# Patient Record
Sex: FEMALE | Race: OTHER
[De-identification: names, ages, dates, MRNs, and addresses within clinical notes are randomized per-mention and may not be internally consistent; named-entity substitution may affect disease eponyms.]

---

## 2019-07-25 ENCOUNTER — HOSPITAL ENCOUNTER (INPATIENT)
Dept: HOSPITAL 72 - EMR | Age: 74
LOS: 12 days | Discharge: NURSING, REHAB OR LONG-TERM CARE | DRG: 280 | End: 2019-08-06
Payer: MEDICARE

## 2019-07-25 VITALS — BODY MASS INDEX: 32.44 KG/M2 | WEIGHT: 190 LBS | HEIGHT: 64 IN

## 2019-07-25 VITALS — SYSTOLIC BLOOD PRESSURE: 134 MMHG | DIASTOLIC BLOOD PRESSURE: 90 MMHG

## 2019-07-25 DIAGNOSIS — E11.22: ICD-10-CM

## 2019-07-25 DIAGNOSIS — Z99.81: ICD-10-CM

## 2019-07-25 DIAGNOSIS — E55.9: ICD-10-CM

## 2019-07-25 DIAGNOSIS — E11.42: ICD-10-CM

## 2019-07-25 DIAGNOSIS — E87.2: ICD-10-CM

## 2019-07-25 DIAGNOSIS — E11.65: ICD-10-CM

## 2019-07-25 DIAGNOSIS — I13.0: Primary | ICD-10-CM

## 2019-07-25 DIAGNOSIS — B96.1: ICD-10-CM

## 2019-07-25 DIAGNOSIS — A41.9: ICD-10-CM

## 2019-07-25 DIAGNOSIS — J44.1: ICD-10-CM

## 2019-07-25 DIAGNOSIS — Z95.0: ICD-10-CM

## 2019-07-25 DIAGNOSIS — I48.92: ICD-10-CM

## 2019-07-25 DIAGNOSIS — Z86.73: ICD-10-CM

## 2019-07-25 DIAGNOSIS — I25.10: ICD-10-CM

## 2019-07-25 DIAGNOSIS — N39.0: ICD-10-CM

## 2019-07-25 DIAGNOSIS — E44.0: ICD-10-CM

## 2019-07-25 DIAGNOSIS — J18.9: ICD-10-CM

## 2019-07-25 DIAGNOSIS — Z95.5: ICD-10-CM

## 2019-07-25 DIAGNOSIS — J44.0: ICD-10-CM

## 2019-07-25 DIAGNOSIS — I21.4: ICD-10-CM

## 2019-07-25 DIAGNOSIS — E83.42: ICD-10-CM

## 2019-07-25 DIAGNOSIS — G93.41: ICD-10-CM

## 2019-07-25 DIAGNOSIS — J96.02: ICD-10-CM

## 2019-07-25 DIAGNOSIS — I49.5: ICD-10-CM

## 2019-07-25 DIAGNOSIS — I48.0: ICD-10-CM

## 2019-07-25 DIAGNOSIS — E11.319: ICD-10-CM

## 2019-07-25 DIAGNOSIS — I25.5: ICD-10-CM

## 2019-07-25 DIAGNOSIS — G47.33: ICD-10-CM

## 2019-07-25 DIAGNOSIS — N17.9: ICD-10-CM

## 2019-07-25 DIAGNOSIS — I50.43: ICD-10-CM

## 2019-07-25 DIAGNOSIS — E78.5: ICD-10-CM

## 2019-07-25 DIAGNOSIS — N18.9: ICD-10-CM

## 2019-07-25 LAB
ADD MANUAL DIFF: NO
APPEARANCE UR: CLEAR
APTT PPP: (no result) S
BASOPHILS NFR BLD AUTO: 0.7 % (ref 0–2)
EOSINOPHIL NFR BLD AUTO: 1.7 % (ref 0–3)
ERYTHROCYTE [DISTWIDTH] IN BLOOD BY AUTOMATED COUNT: 15.6 % (ref 11.6–14.8)
GLUCOSE UR STRIP-MCNC: (no result) MG/DL
HCT VFR BLD CALC: 37.9 % (ref 37–47)
HGB BLD-MCNC: 11.7 G/DL (ref 12–16)
KETONES UR QL STRIP: NEGATIVE
LEUKOCYTE ESTERASE UR QL STRIP: NEGATIVE
LYMPHOCYTES NFR BLD AUTO: 11.1 % (ref 20–45)
MCV RBC AUTO: 91 FL (ref 80–99)
MONOCYTES NFR BLD AUTO: 8.1 % (ref 1–10)
NEUTROPHILS NFR BLD AUTO: 78.5 % (ref 45–75)
NITRITE UR QL STRIP: POSITIVE
PH UR STRIP: 5 [PH] (ref 4.5–8)
PLATELET # BLD: 586 K/UL (ref 150–450)
PROT UR QL STRIP: (no result)
RBC # BLD AUTO: 4.17 M/UL (ref 4.2–5.4)
SP GR UR STRIP: 1.01 (ref 1–1.03)
UROBILINOGEN UR-MCNC: NORMAL MG/DL (ref 0–1)
WBC # BLD AUTO: 9.9 K/UL (ref 4.8–10.8)

## 2019-07-25 PROCEDURE — 82803 BLOOD GASES ANY COMBINATION: CPT

## 2019-07-25 PROCEDURE — 36600 WITHDRAWAL OF ARTERIAL BLOOD: CPT

## 2019-07-25 PROCEDURE — 82550 ASSAY OF CK (CPK): CPT

## 2019-07-25 PROCEDURE — 83036 HEMOGLOBIN GLYCOSYLATED A1C: CPT

## 2019-07-25 PROCEDURE — 84132 ASSAY OF SERUM POTASSIUM: CPT

## 2019-07-25 PROCEDURE — 85025 COMPLETE CBC W/AUTO DIFF WBC: CPT

## 2019-07-25 PROCEDURE — 74018 RADEX ABDOMEN 1 VIEW: CPT

## 2019-07-25 PROCEDURE — 87040 BLOOD CULTURE FOR BACTERIA: CPT

## 2019-07-25 PROCEDURE — 87181 SC STD AGAR DILUTION PER AGT: CPT

## 2019-07-25 PROCEDURE — 82553 CREATINE MB FRACTION: CPT

## 2019-07-25 PROCEDURE — 93005 ELECTROCARDIOGRAM TRACING: CPT

## 2019-07-25 PROCEDURE — 80061 LIPID PANEL: CPT

## 2019-07-25 PROCEDURE — 93306 TTE W/DOPPLER COMPLETE: CPT

## 2019-07-25 PROCEDURE — 87070 CULTURE OTHR SPECIMN AEROBIC: CPT

## 2019-07-25 PROCEDURE — 93970 EXTREMITY STUDY: CPT

## 2019-07-25 PROCEDURE — 96365 THER/PROPH/DIAG IV INF INIT: CPT

## 2019-07-25 PROCEDURE — 82962 GLUCOSE BLOOD TEST: CPT

## 2019-07-25 PROCEDURE — 83880 ASSAY OF NATRIURETIC PEPTIDE: CPT

## 2019-07-25 PROCEDURE — 83735 ASSAY OF MAGNESIUM: CPT

## 2019-07-25 PROCEDURE — 70450 CT HEAD/BRAIN W/O DYE: CPT

## 2019-07-25 PROCEDURE — 84484 ASSAY OF TROPONIN QUANT: CPT

## 2019-07-25 PROCEDURE — 71045 X-RAY EXAM CHEST 1 VIEW: CPT

## 2019-07-25 PROCEDURE — 99285 EMERGENCY DEPT VISIT HI MDM: CPT

## 2019-07-25 PROCEDURE — 87205 SMEAR GRAM STAIN: CPT

## 2019-07-25 PROCEDURE — 36415 COLL VENOUS BLD VENIPUNCTURE: CPT

## 2019-07-25 PROCEDURE — 94664 DEMO&/EVAL PT USE INHALER: CPT

## 2019-07-25 PROCEDURE — 87081 CULTURE SCREEN ONLY: CPT

## 2019-07-25 PROCEDURE — 85007 BL SMEAR W/DIFF WBC COUNT: CPT

## 2019-07-25 PROCEDURE — 94003 VENT MGMT INPAT SUBQ DAY: CPT

## 2019-07-25 PROCEDURE — 81003 URINALYSIS AUTO W/O SCOPE: CPT

## 2019-07-25 PROCEDURE — 94640 AIRWAY INHALATION TREATMENT: CPT

## 2019-07-25 PROCEDURE — 94002 VENT MGMT INPAT INIT DAY: CPT

## 2019-07-25 PROCEDURE — 80053 COMPREHEN METABOLIC PANEL: CPT

## 2019-07-25 PROCEDURE — 87086 URINE CULTURE/COLONY COUNT: CPT

## 2019-07-25 PROCEDURE — 80048 BASIC METABOLIC PNL TOTAL CA: CPT

## 2019-07-25 PROCEDURE — 94660 CPAP INITIATION&MGMT: CPT

## 2019-07-25 PROCEDURE — 84443 ASSAY THYROID STIM HORMONE: CPT

## 2019-07-25 PROCEDURE — 96375 TX/PRO/DX INJ NEW DRUG ADDON: CPT

## 2019-07-25 NOTE — EMERGENCY ROOM REPORT
History of Present Illness


General


Chief Complaint:  General Complaint


Source:  Patient, Medical Record





Present Illness


HPI


This is a 74-year-old  female with a history of diabetes, CHF, frequent 

fall who was just discharged from [facility] on July 17, 2018 for acute 

CHF exacerbation.  [facility] for chief complaint of increasing weakness and fall.  Work-up data 

showed elevated glucose and patient was given insulin.  Also labs and chest x-

ray show CHF.  Patient was given a dose of Lasix.  She was hypoxic on room air 

at 83%.  On 4 L it went to 98%.  She was seen there and sent to a SNF via BLS.  

When she moved at the nursing home, because of her condition, they refused 

admission and EMS brought her here instead.  Patient is complaining of 

generalized weakness.  Denies any other trauma.  Denies any fever chills but 

denies any nausea or vomiting.


Allergies:  


Uncoded Allergies:  


     SULFA (Allergy, Unknown, 7/25/19)





Patient History


Past Medical History:  see triage record, old chart reviewed, DM, HTN


Past Surgical History:  other


Pertinent Family History:  none


Social History:  Denies: smoking


Pregnant Now:  No


Immunizations:  other


Reviewed Nursing Documentation:  PMH: Agreed; PSxH: Agreed





Nursing Documentation-PMH


Past Medical History:  No Stated History


Hx Cardiac Problems:  Yes - CHF


Hx Hypertension:  Yes


Hx COPD:  Yes


Hx Diabetes:  Yes





Review of Systems


Constitutional:  Reports: malaise, weakness


Eye:  Denies: eye pain, blurred vision


ENT:  Denies: ear pain, nose congestion, throat swelling


Respiratory:  Denies: cough, shortness of breath


Cardiovascular:  Denies: chest pain, palpitations


Gastrointestinal:  Denies: abdominal pain, diarrhea, nausea, vomiting


Musculoskeletal:  Denies: back pain, joint pain


Skin:  Denies: rash


Neurological:  Denies: headache, numbness


Endocrine:  Denies: increased thirst, increased urine


Hematologic/Lymphatic:  Denies: easy bruising


All Other Systems:  negative except mentioned in HPI





Physical Exam





Vital Signs








  Date Time  Temp Pulse Resp B/P (MAP) Pulse Ox O2 Delivery O2 Flow Rate FiO2


 


7/25/19 22:50 98.1 18 16 121/93 (102) 97 Room Air  





Vitals unremarkable


Sp02 EP Interpretation:  reviewed, normal


General Appearance:  well appearing, no apparent distress, alert


Head:  normocephalic, atraumatic


Eyes:  bilateral eye PERRL, bilateral eye EOMI


ENT:  hearing grossly normal, normal pharynx


Neck:  full range of motion, supple, no meningismus


Respiratory:  chest non-tender, normal breath sounds, rales


Cardiovascular #1:  regular rate, rhythm, no murmur


Gastrointestinal:  normal bowel sounds, non tender, no mass, no organomegaly, 

no bruit, non-distended


Musculoskeletal:  back normal, normal range of motion, other - 1+ pitting edema


Psychiatric:  mood/affect normal





Medical Decision Making


Diagnostic Impression:  


 Primary Impression:  


 Acute exacerbation of CHF (congestive heart failure)


 Qualified Codes:  I50.9 - Heart failure, unspecified


 Additional Impressions:  


 Uncontrolled diabetes mellitus


 Qualified Codes:  E11.65 - Type 2 diabetes mellitus with hyperglycemia


 Bacteriuria


ER Course


Patient presents with altered mental status and weakness.  Her glucose very 

elevated.  She may have a urinary tract infection.  She does have bacteruria 

and positive nitrite.  We will go ahead and treat with antibiotics.  CT 

negative for CVA.  Troponin negative.  I discussed the case with Dr. Alva who 

will admit.


Lab Results Impression


Labs with elevated BNP and glucose


EKG Diagnostic Results


Rate:  normal, tachycardiac


Rhythm:  NSR


ST Segments:  other - bifasicular block





Rhythm Strip Diag. Results


EP Interpretation:  yes


Rate:  113


Rhythm:  NSR, no PVC's, no ectopy





Chest X-Ray Diagnostic Results


Chest X-Ray Diagnostic Results :  


   Chest X-Ray Ordered:  Yes


   # of Views/Limited/Complete:  1 View


   Indication:  Shortness of Breath


   EP Interpretation:  Yes


   Interpretation:  no consolidation, no effusion, no pneumothorax, other - 

Vascular congestion


   Impression:  Other - chf


   Electronically Signed by:  Kamron Dickson MD





CT/MRI/US Diagnostic Results


CT/MRI/US Diagnostic Results :  


   Imaging Test Ordered:  CT head


   Impression


Neg per radiologist





Last Vital Signs








  Date Time  Temp Pulse Resp B/P (MAP) Pulse Ox O2 Delivery O2 Flow Rate FiO2


 


7/25/19 22:50 98.1 18 16 121/93 (102) 97 Room Air  








Status:  improved


Disposition:  ADMITTED AS INPATIENT


Condition:  Serious











Kamron Dickson MD Jul 25, 2019 23:20

## 2019-07-25 NOTE — NUR
ED Nurse Note:

Pt BIBA RA 34, no medicl complaints at this time, [facility] home refused to accept 
pt from EMS after being sent home from a clinic. Pt is AO x 3~4 times, on 2L 
N/C. ANNE seen Pt at bedside.

## 2019-07-26 VITALS — SYSTOLIC BLOOD PRESSURE: 138 MMHG | DIASTOLIC BLOOD PRESSURE: 83 MMHG

## 2019-07-26 VITALS — SYSTOLIC BLOOD PRESSURE: 165 MMHG | DIASTOLIC BLOOD PRESSURE: 52 MMHG

## 2019-07-26 VITALS — DIASTOLIC BLOOD PRESSURE: 65 MMHG | SYSTOLIC BLOOD PRESSURE: 124 MMHG

## 2019-07-26 VITALS — SYSTOLIC BLOOD PRESSURE: 135 MMHG | DIASTOLIC BLOOD PRESSURE: 53 MMHG

## 2019-07-26 VITALS — SYSTOLIC BLOOD PRESSURE: 125 MMHG | DIASTOLIC BLOOD PRESSURE: 75 MMHG

## 2019-07-26 VITALS — SYSTOLIC BLOOD PRESSURE: 167 MMHG | DIASTOLIC BLOOD PRESSURE: 82 MMHG

## 2019-07-26 VITALS — DIASTOLIC BLOOD PRESSURE: 81 MMHG | SYSTOLIC BLOOD PRESSURE: 120 MMHG

## 2019-07-26 VITALS — DIASTOLIC BLOOD PRESSURE: 63 MMHG | SYSTOLIC BLOOD PRESSURE: 117 MMHG

## 2019-07-26 VITALS — SYSTOLIC BLOOD PRESSURE: 123 MMHG | DIASTOLIC BLOOD PRESSURE: 76 MMHG

## 2019-07-26 VITALS — DIASTOLIC BLOOD PRESSURE: 70 MMHG | SYSTOLIC BLOOD PRESSURE: 148 MMHG

## 2019-07-26 VITALS — SYSTOLIC BLOOD PRESSURE: 151 MMHG | DIASTOLIC BLOOD PRESSURE: 79 MMHG

## 2019-07-26 VITALS — DIASTOLIC BLOOD PRESSURE: 76 MMHG | SYSTOLIC BLOOD PRESSURE: 136 MMHG

## 2019-07-26 VITALS — DIASTOLIC BLOOD PRESSURE: 68 MMHG | SYSTOLIC BLOOD PRESSURE: 131 MMHG

## 2019-07-26 VITALS — SYSTOLIC BLOOD PRESSURE: 121 MMHG | DIASTOLIC BLOOD PRESSURE: 67 MMHG

## 2019-07-26 VITALS — SYSTOLIC BLOOD PRESSURE: 105 MMHG | DIASTOLIC BLOOD PRESSURE: 57 MMHG

## 2019-07-26 VITALS — SYSTOLIC BLOOD PRESSURE: 123 MMHG | DIASTOLIC BLOOD PRESSURE: 87 MMHG

## 2019-07-26 VITALS — SYSTOLIC BLOOD PRESSURE: 161 MMHG | DIASTOLIC BLOOD PRESSURE: 83 MMHG

## 2019-07-26 VITALS — DIASTOLIC BLOOD PRESSURE: 79 MMHG | SYSTOLIC BLOOD PRESSURE: 119 MMHG

## 2019-07-26 VITALS — DIASTOLIC BLOOD PRESSURE: 88 MMHG | SYSTOLIC BLOOD PRESSURE: 153 MMHG

## 2019-07-26 LAB
ALBUMIN SERPL-MCNC: 3.3 G/DL (ref 3.4–5)
ALBUMIN/GLOB SERPL: 0.7 {RATIO} (ref 1–2.7)
ALP SERPL-CCNC: 146 U/L (ref 46–116)
ALT SERPL-CCNC: 13 U/L (ref 12–78)
ANION GAP SERPL CALC-SCNC: 3 MMOL/L (ref 5–15)
ANION GAP SERPL CALC-SCNC: 7 MMOL/L (ref 5–15)
AST SERPL-CCNC: 30 U/L (ref 15–37)
BILIRUB SERPL-MCNC: 0.4 MG/DL (ref 0.2–1)
BUN SERPL-MCNC: 26 MG/DL (ref 7–18)
BUN SERPL-MCNC: 34 MG/DL (ref 7–18)
CALCIUM SERPL-MCNC: 10 MG/DL (ref 8.5–10.1)
CALCIUM SERPL-MCNC: 9.7 MG/DL (ref 8.5–10.1)
CHLORIDE SERPL-SCNC: 100 MMOL/L (ref 98–107)
CHLORIDE SERPL-SCNC: 97 MMOL/L (ref 98–107)
CK MB SERPL-MCNC: < 0.5 NG/ML (ref 0–3.6)
CK SERPL-CCNC: 87 U/L (ref 26–308)
CO2 SERPL-SCNC: 37 MMOL/L (ref 21–32)
CO2 SERPL-SCNC: 38 MMOL/L (ref 21–32)
CREAT SERPL-MCNC: 1.1 MG/DL (ref 0.55–1.3)
CREAT SERPL-MCNC: 1.4 MG/DL (ref 0.55–1.3)
GLOBULIN SER-MCNC: 4.8 G/DL
POTASSIUM SERPL-SCNC: 3.8 MMOL/L (ref 3.5–5.1)
POTASSIUM SERPL-SCNC: 6.6 MMOL/L (ref 3.5–5.1)
SODIUM SERPL-SCNC: 138 MMOL/L (ref 136–145)
SODIUM SERPL-SCNC: 144 MMOL/L (ref 136–145)

## 2019-07-26 RX ADMIN — HYDRALAZINE HYDROCHLORIDE SCH MG: 50 TABLET ORAL at 22:20

## 2019-07-26 RX ADMIN — APIXABAN SCH MG: 5 TABLET, FILM COATED ORAL at 17:27

## 2019-07-26 RX ADMIN — PANTOPRAZOLE SODIUM SCH MG: 40 INJECTION, POWDER, FOR SOLUTION INTRAVENOUS at 21:27

## 2019-07-26 RX ADMIN — INSULIN ASPART SCH UNITS: 100 INJECTION, SOLUTION INTRAVENOUS; SUBCUTANEOUS at 21:33

## 2019-07-26 RX ADMIN — IPRATROPIUM BROMIDE AND ALBUTEROL SULFATE SCH ML: .5; 3 SOLUTION RESPIRATORY (INHALATION) at 19:00

## 2019-07-26 RX ADMIN — INSULIN ASPART SCH UNITS: 100 INJECTION, SOLUTION INTRAVENOUS; SUBCUTANEOUS at 11:30

## 2019-07-26 RX ADMIN — ATORVASTATIN CALCIUM SCH MG: 80 TABLET, FILM COATED ORAL at 21:26

## 2019-07-26 RX ADMIN — METHYLPREDNISOLONE SODIUM SUCCINATE SCH MG: 40 INJECTION, POWDER, LYOPHILIZED, FOR SOLUTION INTRAMUSCULAR; INTRAVENOUS at 22:19

## 2019-07-26 RX ADMIN — INSULIN ASPART SCH UNITS: 100 INJECTION, SOLUTION INTRAVENOUS; SUBCUTANEOUS at 16:30

## 2019-07-26 RX ADMIN — IPRATROPIUM BROMIDE AND ALBUTEROL SULFATE SCH ML: .5; 3 SOLUTION RESPIRATORY (INHALATION) at 12:50

## 2019-07-26 RX ADMIN — HYDRALAZINE HYDROCHLORIDE SCH MG: 50 TABLET ORAL at 14:00

## 2019-07-26 NOTE — NUR
NURSE NOTES:

Pt is maintained on Bipap with settings 14/5 and FIO2 currently at 40% with O2sat 
fluctuating above 94%. Cardiac monitor displays arrhythmia with heart rate in the upper 
90's-100's. Pt remains afebrile. Pt has been repositioned with extremities elevated on 
pillows. Head of bed at semi-peters's.

## 2019-07-26 NOTE — NUR
NURSE NOTES:

Received pt who was transferred from [facility] via hospital bed to ICU. Pt hand off report 
received from Kirby BLEVINS. Pt is asleep, lethargic, responsive to painful stimuli. Pt is 
currently on Venturi mask at 6L, and is currently being placed on BIPAP per MD order by RT 
with settings 14/5 and FIO2 40% with O2sat fluctuating from 92-98%. Diminished breath sounds 
noted on auscultation. Cardiac monitor displays AFib with heart rate fluctuating in the 90's 
and palpable bounding peripheral pulses with edema on lower extremities. Pt has no IV access 
at this time. Will attempt to access peripheral line. Abdomen is large, round, distended, 
slightly hard/nontender to touch with hypoactive bowel sounds. Renee catheter is in place 
draining clear, yellow urine. Bed is locked with three side rails up, in lowest position and 
call light within easy reach. Will continue to monitor pt and follow plan of care per MD 
orders and protocol.

## 2019-07-26 NOTE — HISTORY AND PHYSICAL REPORT
DATE OF ADMISSION:  07/26/2019

CHIEF COMPLAINT:  Short of breath.



HISTORY OF PRESENT ILLNESS:  The patient is a 74-year-old woman, who was

admitted because of shortness of breath and diabetes, out of control.  She

was recently hospitalized at another facility for similar problems and was

discharged home.  She became weak at home after several days and yesterday

the family brought her to urgent care where she was evaluated.  She was

found to have compensated heart failure and frequent falls.  Her blood

sugar was 437 in the urgent care facility, but she was discharged to a

skilled nursing care facility.  When she arrived there, she was lethargic

and in respiratory distress and they refused her and she was redirected to

the emergency department here.  The patient is poorly responsive and can

provide no additional history.  Records reviewed.



PAST MEDICAL HISTORY:  Congestive heart failure, type 2 diabetes, frequent

falls, sick sinus syndrome, pacemaker, COPD, sleep apnea, anemia, aortic

atherosclerosis, coronary heart disease, chronic back pain, chronic

diastolic heart failure, chronic kidney disease, hyperlipidemia,

degenerative disc disease, peripheral neuropathy, and morbid obesity.  She

is oxygen dependent.  She has had a coronary stent placed in the past.

She has diabetic retinopathy.  There is vitamin D deficiency.



ALLERGIES:  Sulfa.



CODE STATUS:  DNR.



SURGICAL HISTORY:  Cholecystectomy, pacemaker.



SOCIAL HISTORY:  She does not drink or smoke.



REVIEW OF SYSTEMS:  Cannot be obtained.



MEDICATIONS:  Advair, amlodipine, baby aspirin, Lipitor, insulin, Plavix,

Lasix, hydralazine, iron, Januvia, lisinopril, metformin, metoprolol, and

vitamin D.



PHYSICAL EXAMINATION:

GENERAL:  The patient is poorly responsive, but is arousable.

VITAL SIGNS:  Blood pressure is satisfactory, heart rate was 79 to 92,

saturations 93% to 100% on mask oxygen.  There is no fever.  She is

somewhat overweight.

HEENT:  Head is normocephalic.

NECK:  No jugular veins visible.

CHEST:  Has a few rhonchi.

CARDIAC:  Irregular with rate controlled.

ABDOMEN:  Soft and nontender.  Liver and spleen are not enlarged.

EXTREMITIES:  No clubbing, cyanosis, or edema.



DIAGNOSTIC AND LABORATORY DATA:  Chest x-ray is reported to show vascular

congestion.  It is not available for my review.  EKG shows right

bundle-branch block and atrial fibrillation.  Laboratory tests show the

blood sugar was 424, but improved somewhat with insulin.  Potassium was

6.6, but improved on repeat.  BUN is 26, creatinine 1.1.  Natriuretic

peptide 4000.  Troponin negative.  Albumin is 3.3.  White count is normal,

hemoglobin is 11.7, and platelets are high at 586,000.  Urinalysis shows

protein and glucose.  CT brain showed an old infarct, but nothing acute.



IMPRESSION:

1. Congestive heart failure.

2. Diabetes, out of control.

3. Altered mental status with evidence of old cerebral infarct.

4. COPD.

5. Sleep apnea.

6. Atrial fibrillation, not on anticoagulants.

7. Coronary heart disease.

8. Aortic atherosclerosis.



PLAN:  The patient will be cared for on the telemetry floor.  We will get a

stat blood gas to evaluate for possible CO2 narcosis.  Cardiology and

Endocrinology have been called for consultation.  Neurology may be

appropriate.  If she has respiratory failure, she may be transferred to

ICU.  We will give Lasix intravenous and increase the insulin as needed.









  ______________________________________________

  Yakov Alva M.D. DR:  SOPHIA

D:  07/26/2019 08:52

T:  07/27/2019 00:33

JOB#:  [id number]

CC:

## 2019-07-26 NOTE — NUR
NURSE NOTES:

Orders received from Dr Alva for daily labs at 0400 CBC, CMP and ABGs. Order also received 
for NG tube insertion to for PO med administration and to start tube feeding per nutrition 
consult recommendation. Order also received for Troponin Q8hrs x3 and lipid/TSH for 0400 
tomorrow, venous duplex and SCDS. Will process all orders, follow and endorse to next shift.

## 2019-07-26 NOTE — DIAGNOSTIC IMAGING REPORT
Indication: Shortness of breath and coughing

 

Technique: One view of the chest

 

Comparison: None

 

Findings: Body habitus limits evaluation. Suboptimal inspiration. The heart is

enlarged. There is a left chest bifocal pacemaker. There is equivocal mild central

interstitial congestive changes.

 

Impression: Cardiomegaly

 

Equivocal mild interstitial congestion correlate with clinical findings

## 2019-07-26 NOTE — NUR
NURSE NOTES:

Pt is maintained on Bipap with settings 14/5 and FIO2 at 40% with O2sat above 95%. Pt's 
daughter and son are now at bedside. VS stable. Pt was repositioned.

## 2019-07-26 NOTE — NUR
NURSE NOTES:

Received pt from ED via gurney. Pt transferred to telemetry unit and bed without any 
incident. Received report from GEN Flannery. Pt is lethargic; arousable by name and light 
shaking. Pt is currently on 3L N/C, breathing through mouth and using accessory muscles; 
saturating at 95%. Vital signs are stable. Cardiac monitor is in placed, IV site intact, 
asymptomatic, and patent. Bed is in the lowest position and locked. Belongings list checked 
and accounted. Will call Dr. Alva for admission orders.

## 2019-07-26 NOTE — NUR
NURSE NOTES:

Called respiratory therapist, Kumar, who put pt on venturi mask at 6L, 35%. Pt's O2 is 
saturating at 93% with history of COPD.

## 2019-07-26 NOTE — EMERGENCY ROOM REPORT
History of Present Illness


General


Chief Complaint:  General Complaint


Source:  Patient, Medical Record





Present Illness


Allergies:  


Coded Allergies:  


     SULFA (SULFONAMIDE ANTIBIOTICS) (Unverified  Allergy, Unknown, 7/26/19)


Uncoded Allergies:  


     SULFA (Allergy, Unknown, 7/25/19)





Patient History


Pregnant Now:  No





Nursing Documentation-[facility]


Past Medical History:  No History, Except For


Hx Cardiac Problems:  Yes


Hx Hypertension:  Yes


Hx Pacemaker:  Yes - Lt chest


Hx Asthma:  Yes


Hx COPD:  Yes


Hx Diabetes:  Yes


Hx Cancer:  No


Hx Gastrointestinal Problems:  No


Hx Neurological Problems:  No





Physical Exam





Vital Signs








  Date Time  Temp Pulse Resp B/P (MAP) Pulse Ox O2 Delivery O2 Flow Rate FiO2


 


7/25/19 22:50 98.1 18 16 121/93 (102) 97 Room Air  


 


7/26/19 04:46       6.0 


 


7/26/19 09:50        30











Procedures


Critical Care Time


Critical Care Time


i.  I feel this is a highly complex case requiring extensive working including 

EKG/Rhythm strip, Xray/CT/US, Blood/urine lab work, repeat exams while in ED, 


and administration of strong opiates/narcotics for pain control, admission to 

hospital or close patient follow up.  





Total time: 30 min bedside evaluation and treatment excludes procedures (EKG). 


Reason for critical care: hypercapnia, acidosis, COPD


Possible complications: hypotension, hypertension, MI, shock, arrhythmias, 

metabolic acidosis, end organ damage, respiratory failure. 


Interventions: interpretation of ABG. intubation. 


Course: Called to evaluate this patient in the ICU.  On BiPAP for COPD.  After 

several hours of BiPAP repeat ABG shows worsening of PCO2 and pH.  Patient 

remains lethargic.  I made decision to intubate patient.  Intubated without 

complication.  Repeat chest x-ray shows ET tube in mainstem bronchus.  Will be 

pulled back and adjusted accordingly





Consultations: nursing staff, EMS, family 


Performed by: Dr Leija


Tolerated well condition = critical





j.  because of unstable vital signs this patient had a condition that could 

potentially threaten life or limb.  I feel this is a critical patient who 

required my full attention while patient was considered critical.  Total 

Critical Care Time excluding procedures was greater than 30 minutes





Intubation


Intubation :  


   Consent:  Emergent


   Intubation Method:  orotracheal


   Tube Size (cm):  7.5


   Medications:  Etomidate, Rocuronium


   Breath Sounds after Intubation:  equal


   Intubation Complications:  no complications


   Post Intubation Xray:  Yes


   Progress/Xray Impression:  ET TUbe in Right Mainstem Bronchus


   Attempts:  One


   Patient Tolerated:  Well


   Complications:  None





Medical Decision Making


Diagnostic Impression:  


 Primary Impression:  


 Acute exacerbation of CHF (congestive heart failure)


 Qualified Codes:  I50.9 - Heart failure, unspecified


 Additional Impressions:  


 Bacteriuria


 Uncontrolled diabetes mellitus


 Qualified Codes:  E11.65 - Type 2 diabetes mellitus with hyperglycemia


ER Course


I was called to evaluate this patient in the ICU.  On BiPAP for COPD.  After 

several hours of BiPAP patient ABG getting worse and PCO2 getting worse.  

Patient is lethargic.  I made decision to intubate patient.  Good breath sounds 

bilaterally.  O2 sats improved.  Repeat chest x-ray shows ET tube in mainstem 

bronchus.  Will be adjusted accordingly.  Care resumed by admitting physician





Last Vital Signs








  Date Time  Temp Pulse Resp B/P (MAP) Pulse Ox O2 Delivery O2 Flow Rate FiO2


 


7/26/19 14:00  97 15 120/81 (94) 99   


 


7/26/19 12:52      Facial  40


 


7/26/19 12:00 98.0       


 


7/26/19 09:00       6.0 








Status:  improved


Disposition:  ADMITTED AS INPATIENT


Condition:  Critical


Referrals:  


[facility],REFERRING (PCP)











Naveen Leija MD Jul 26, 2019 16:33

## 2019-07-26 NOTE — NUR
RESPIRATORY NOTE:

 Placed pt on Bipap 14/5- back up rate 12- 30%FiO2. Pt is lethargic, tolerating well with 
the setting. Foam tapes applied to cheeks, chin, and nose bridge to prevent skin breakdown, 
no redness or skin breakdown on pt face. End tidal CO2 monitor in place at bedside, EtCO2 
75mmHg. No SOB or resp distress noted at this time. Alarms are set and audible, Bipap is 
plugged into the red outlet, ambu bag is at bedside. Will continue to monitor pt.

## 2019-07-26 NOTE — DIAGNOSTIC IMAGING REPORT
Indication: Status post repositioning of malpositioned endotracheal tube

 

Technique: One view of the chest

 

Comparison: One hour earlier

 

Findings: Interim improved position previously malpositioned endotracheal tube, tip

now in satisfactory position approximately 3 cm above the carleen. Other findings are

unchanged

 

Impression: Improved and now satisfactory position of endotracheal tube

## 2019-07-26 NOTE — CARDIOLOGY REPORT
--------------- APPROVED REPORT --------------





EKG Measurement

Heart [id number]

[id number]

[id number]

[id number]





Atrial flutter with variable AV block

Right bundle branch block

Left posterior fascicular block

*** Bifascicular block ***

T wave abnormality, consider inferior ischemia

Abnormal ECG

## 2019-07-26 NOTE — NUR
NURSE NOTES:

Pt is asleep, awakens to touch, however lethargic. VS stable while currently on BIPAP. 
Family now at bedside. Renee continues to drain yellow urine with sediments. Pt is afebrile.

## 2019-07-26 NOTE — NUR
ED Nurse Note:

Recheck . Pt admit to Tele room 214-2. Report given to GEN Abdalla. Pt is 
current asleep,VSS, on 2L N/C 100% O2 Sat no distress.

## 2019-07-26 NOTE — NUR
NURSE NOTES:

Pt was intubated at bedside by ER MD, two RTs at bedside. ETT 7.5, currently at 23cm 
lipline, with vent settings AC20, , Peep 5.0, FIO2 40%, with O2sat now at 100%. Sputum 
culture was collected and sent to lab. VS stable.

## 2019-07-26 NOTE — DIAGNOSTIC IMAGING REPORT
Indication: Post intubation

 

Technique: One view of the chest

 

Comparison: 7/25/2019

 

Findings: Left chest bipolar pacemaker is again demonstrated. Interim endotracheal

intubation, endotracheal tube projecting slightly into the right mainstem bronchus.

There is better inspiration. Lungs and pleural spaces are clear. Heart size is

enlarged.

 

Impression: Low position of endotracheal tube, within the right mainstem bronchus.

Retraction recommended. This critical value finding was discussed by phone with Dr. Leija previously

 

Cardiomegaly

 

Apparent improved interstitial congestion, probably in part apparent and artifactual

due to better aeration of the lungs

## 2019-07-26 NOTE — NUR
HAND-OFF: 

Report given to GEN Chambers.  Patient transfer to ICU room F.  

Patient is baseline lethargic and responded to voice.  Patient is on cardiac monitor and 
afib.  Patient is on venturi mask 6L 35%.  Patient is breathing labored and using accessory 
muscles.  Patient has Renee that is intact, draining and patent.   Patient is on fall 
precaution.  Bed alarm is on, call light in place, bed in lowest position, side rails x 3.  
Noted IV heplock is infiltrated and was removed.  

-------------------------------------------------------------------------------

Addendum: 07/26/19 at 1312 by Kirby Morel RN

-------------------------------------------------------------------------------

Belonging checklist signed.

## 2019-07-26 NOTE — DIAGNOSTIC IMAGING REPORT
Indications: Altered mental status

 

Technique: Spiral acquisitions obtained through the brain. Angled axial and coronal 5

x 5 mm slices were reconstructed. Total dose length product 1415.01 mGycm.  CTDI

vol(s) 70.38 mGy. Dose reduction achieved using automated exposure control

 

Comparison: None.

 

Findings: No acute intracranial hemorrhage or edema, mass effect, nor midline shift.

Small old deep white matter infarct is seen in the right frontal deep white matter.

Otherwise normal gray-white differentiation. Normal size ventricles and extra axial

CSF spaces. Intact calvarium. The mastoids are clear. Visualized orbits and sinuses

are unremarkable.

 

Impression: Old right frontal deep white matter lacunar infarct

 

Negative for acute intracranial bleed or mass effect

 

This agrees with the preliminary interpretation provided overnight by [facility]

teleradiology service.

 

The CT scanner at [facility] is accredited by the American College of

Radiology and the scans are performed using protocols designed to limit radiation

exposure to as low as reasonably achievable to attain images of sufficient resolution

adequate for diagnostic evaluation.

## 2019-07-26 NOTE — NUR
RESPIRATORY NOTE:

Received pt on AC 20, 500VT, 40%, PEEP +5. Pt intubated w/ ETT 7.5 @ 20cm lipline, secured 
by anchorfast. Pt sedated. B/S kami. rhonchi, sxn small amounts of thick/thin, pale-yellow 
secretions w/ occasional red specks. Bite block in place as pt tends to clench jaw. Both 
hands on soft-restraints to prevent pt from self-extubation. Vent plugged into red outlet, 
ambubag at bedside. Pt in no apparent distress at this time. Will continue to monitor pt.

## 2019-07-26 NOTE — CARDIOLOGY REPORT
--------------- APPROVED REPORT --------------





EKG Measurement

Heart [id number]

NJ 96P

[id number]

[id number]

[id number]





Sinus tachycardia with short NJ

Right bundle branch block

Left posterior fascicular block

*** Bifascicular block ***

Abnormal ECG

## 2019-07-26 NOTE — NUR
NURSE NOTES:

RT at bedside. ABGs were redrawn and results reported to Dr Alva. Per Dr Alva's advise, 
ER MD was contacted and plan is now to intubate pt. Pt's family is aware and in the waiting 
room. VS stable currently.

## 2019-07-26 NOTE — NUR
NURSE NOTES:

Received admission orders from Dr. Alva. Also, notified Dr. Alva that pt is currently 
lethargic, on venturi mask at 6L 35%, using accessory muscles and breathing through mouth. 
Dr. Alva said okay. Also mentioned that pt is currently A.Fib on cardiac monitor and 
confirmed with EKG tracing; Dr. Alva ordered Eliquis 5mg BID and to also put Dr. Coker as 
a cardiologist consultation.

## 2019-07-26 NOTE — NUR
NURSE NOTES:

Endorsement received from GEN Chambers. Patient asleep, responds to pain. Orally intubated with 
ET 7.5, 20 lipline. AC 20,. Vt 500, PEEP 5, 40% FiO2. With NGT at right nare, pending 
confirmation with KUB. With heplock g 20 at right AC, and left AC g18. Renee catheter 
draining to urimeter. With bilateral soft wrists restraints for attempting to pull out 
tubings. Head of bed elevated, locked and in low position. Bed alarm on, call light within 
reach.

## 2019-07-26 NOTE — HISTORY & PHYSICAL
History and Physical


History & Physicial


dict





Acute resp failure w CO2 narcosis





transfer to ICU


BiPAP











Yakov Alva MD Jul 26, 2019 08:57

## 2019-07-26 NOTE — CARDIOLOGY REPORT
--------------- APPROVED REPORT --------------





EXAM: Two-dimensional and M-mode echocardiogram with Doppler and color Doppler.



INDICATION

Atrial fibrillation



M-Mode DIMENSIONS 

IVSd1.7 (0.7-1.1cm)Left Atrium (MM)4.8 (1.6-4.0cm)

LVDd3.6 (3.5-5.6cm)Aortic Root2.2 (2.0-3.7cm)

PWd1.5 (0.7-1.1cm)Aortic Cusp Exc.1.5 (1.5-2.0cm)



LVDs2.6 (2.5-4.0cm)

PWs1.7 cm





Technically difficult study due to poor acoustic windows.

Study quality precludes accurate assessment of regional wall motion.

Normal left ventricular chamber size.

Global left ventricular hypokinesis. 

Left ventricular ejection fraction estimated to be 40-45 %.

Mild left ventricular hypertrophy.

No evidence of pericardial effusion. 

Mild left atrial enlargement.

Right atrial size at upper limits of normal.

Right ventricular chamber sizes is within normal limits.

Focal aortic valve sclerosis with adequate cusp excursion.

Mildly thickened mitral valve leaflets with normal excursion.

Mild mitral annulus and aortic root calcification.

Normal pulmonic valve structure. 

Normal tricuspid valve structure.  

IVC dilated at 2.3 cm without physiological collapse. 



A  color flow and spectral Doppler study was performed and revealed:

No aortic regurgitation.

Mild mitral regurgitation.

Left ventricular diastolic function could not be determined due to A-Fib.

Mild tricuspid regurgitation.

Tricuspid systolic velocities suggests peak right ventricular systolic pressure of 31 

mmHg.

Trace pulmonic regurgitation present.

## 2019-07-26 NOTE — NUR
NURSE NOTES:

Unable to give Lasix 40mg IVPB.  IV on right hand infiltrated.  Attempted twice to insert 
IV. was unsuccessful.  was endorse to ICU nurse Trish.

## 2019-07-26 NOTE — NUR
RESPIRATORY NOTE:

Intubated patient with a 7.5 ETT at 23cm at the lip. ACVC 20, , 40% fio2, Peep +5. 
Patient currently sedated. Will continue to monitor.

## 2019-07-26 NOTE — NUR
NURSE NOTES:

Patient has episodes of non sustained v tach, 4-5 runs. Called Dr. Coker and left a 
message. Awaiting for return call.

## 2019-07-26 NOTE — NUR
ED Nurse Note:

Demographics finally obtained.  Message left with Pt's daughter, Alma Simon 
([phone]).

-------------------------------------------------------------------------------

Addendum: 07/26/19 at 0358 by CorporaHRAGE

-------------------------------------------------------------------------------

Pt's daughter returned call - informed of her mother's whereabouts.

## 2019-07-26 NOTE — NUR
NURSE NOTES:

Order and consent was received for PICC placement if unable to obtain peripheral IV access. 
Radiology technician was at bedside and able to obtain two peripheral IV access, right AC 
#20G, and left AC #18G.

## 2019-07-26 NOTE — DIAGNOSTIC IMAGING REPORT
EXAM:

  XR Abdomen, 1 View

 

CLINICAL HISTORY:

  TUBE PLCMT

 

TECHNIQUE:

  Frontal supine view of the abdomen/pelvis.

 

COMPARISON:

  No relevant prior studies available.

 

FINDINGS:

  Lower thorax:  Pacemaker and cardiomegaly.

  Gastrointestinal tract:  Enteric tube at the gastroduodenal junction. 

  Organs:  Cholecystectomy clips.

  Bones/joints:  No acute fracture.

  Tubes, lines and devices:   Endotracheal tube in the distal trachea, 

approximately 1 cm above the carleen.

 

IMPRESSION:     

  Enteric tube at the gastroduodenal junction.

## 2019-07-26 NOTE — NUR
CASE MANAGEMENT:REVIEW



74 YR OLD FEMALE BIBA FROM [address]



SI: CHF. UNCONTROLLED DM

98.0   18  79  121/93   97% ON RA

K+6.6    BUN+26



IS: IV LASIX 

IV ROCEPHIN 

IV INSULIN

CT HEAD

CHEST XRAY

**: TO TELEMETRY

## 2019-07-26 NOTE — NUR
NURSE NOTES:

Received patient from GEN Abdalla.  Patient is lethargic and responded to voice.  Patient is on 
cardiac monitor and afib.  Patient is on venturi mask 6L 35%.  Patient is breathing labored 
and using accessory muscles.  Was endorsed that Dr. Alva is aware of patient labored 
breathing.  In addition, notified Saroj, respiratory therapist and Victor Hugo charge nurse.  
Patient has Renee that is intact and patent.  Patient is on fall precaution.  Bed alarm is 
on, call light in place, bed in lowest position, side rails x 3.  Will follow up plan of 
care.

## 2019-07-27 VITALS — DIASTOLIC BLOOD PRESSURE: 57 MMHG | SYSTOLIC BLOOD PRESSURE: 122 MMHG

## 2019-07-27 VITALS — SYSTOLIC BLOOD PRESSURE: 104 MMHG | DIASTOLIC BLOOD PRESSURE: 54 MMHG

## 2019-07-27 VITALS — SYSTOLIC BLOOD PRESSURE: 106 MMHG | DIASTOLIC BLOOD PRESSURE: 58 MMHG

## 2019-07-27 VITALS — DIASTOLIC BLOOD PRESSURE: 72 MMHG | SYSTOLIC BLOOD PRESSURE: 130 MMHG

## 2019-07-27 VITALS — DIASTOLIC BLOOD PRESSURE: 59 MMHG | SYSTOLIC BLOOD PRESSURE: 113 MMHG

## 2019-07-27 VITALS — SYSTOLIC BLOOD PRESSURE: 130 MMHG | DIASTOLIC BLOOD PRESSURE: 66 MMHG

## 2019-07-27 VITALS — DIASTOLIC BLOOD PRESSURE: 67 MMHG | SYSTOLIC BLOOD PRESSURE: 129 MMHG

## 2019-07-27 VITALS — DIASTOLIC BLOOD PRESSURE: 59 MMHG | SYSTOLIC BLOOD PRESSURE: 125 MMHG

## 2019-07-27 VITALS — SYSTOLIC BLOOD PRESSURE: 93 MMHG | DIASTOLIC BLOOD PRESSURE: 58 MMHG

## 2019-07-27 VITALS — SYSTOLIC BLOOD PRESSURE: 104 MMHG | DIASTOLIC BLOOD PRESSURE: 68 MMHG

## 2019-07-27 VITALS — SYSTOLIC BLOOD PRESSURE: 130 MMHG | DIASTOLIC BLOOD PRESSURE: 60 MMHG

## 2019-07-27 VITALS — SYSTOLIC BLOOD PRESSURE: 130 MMHG | DIASTOLIC BLOOD PRESSURE: 68 MMHG

## 2019-07-27 VITALS — SYSTOLIC BLOOD PRESSURE: 123 MMHG | DIASTOLIC BLOOD PRESSURE: 64 MMHG

## 2019-07-27 VITALS — SYSTOLIC BLOOD PRESSURE: 118 MMHG | DIASTOLIC BLOOD PRESSURE: 57 MMHG

## 2019-07-27 VITALS — SYSTOLIC BLOOD PRESSURE: 133 MMHG | DIASTOLIC BLOOD PRESSURE: 68 MMHG

## 2019-07-27 VITALS — SYSTOLIC BLOOD PRESSURE: 118 MMHG | DIASTOLIC BLOOD PRESSURE: 63 MMHG

## 2019-07-27 VITALS — DIASTOLIC BLOOD PRESSURE: 75 MMHG | SYSTOLIC BLOOD PRESSURE: 124 MMHG

## 2019-07-27 VITALS — SYSTOLIC BLOOD PRESSURE: 105 MMHG | DIASTOLIC BLOOD PRESSURE: 61 MMHG

## 2019-07-27 VITALS — SYSTOLIC BLOOD PRESSURE: 129 MMHG | DIASTOLIC BLOOD PRESSURE: 58 MMHG

## 2019-07-27 VITALS — SYSTOLIC BLOOD PRESSURE: 130 MMHG | DIASTOLIC BLOOD PRESSURE: 58 MMHG

## 2019-07-27 VITALS — SYSTOLIC BLOOD PRESSURE: 127 MMHG | DIASTOLIC BLOOD PRESSURE: 62 MMHG

## 2019-07-27 VITALS — SYSTOLIC BLOOD PRESSURE: 101 MMHG | DIASTOLIC BLOOD PRESSURE: 57 MMHG

## 2019-07-27 VITALS — DIASTOLIC BLOOD PRESSURE: 76 MMHG | SYSTOLIC BLOOD PRESSURE: 129 MMHG

## 2019-07-27 VITALS — DIASTOLIC BLOOD PRESSURE: 71 MMHG | SYSTOLIC BLOOD PRESSURE: 120 MMHG

## 2019-07-27 LAB
ADD MANUAL DIFF: YES
ALBUMIN SERPL-MCNC: 2.9 G/DL (ref 3.4–5)
ALBUMIN/GLOB SERPL: 0.6 {RATIO} (ref 1–2.7)
ALP SERPL-CCNC: 130 U/L (ref 46–116)
ALT SERPL-CCNC: 14 U/L (ref 12–78)
ANION GAP SERPL CALC-SCNC: 8 MMOL/L (ref 5–15)
AST SERPL-CCNC: 18 U/L (ref 15–37)
BILIRUB SERPL-MCNC: 0.6 MG/DL (ref 0.2–1)
BUN SERPL-MCNC: 43 MG/DL (ref 7–18)
CALCIUM SERPL-MCNC: 9.5 MG/DL (ref 8.5–10.1)
CHLORIDE SERPL-SCNC: 101 MMOL/L (ref 98–107)
CHOLEST SERPL-MCNC: 131 MG/DL (ref ?–200)
CO2 SERPL-SCNC: 35 MMOL/L (ref 21–32)
CREAT SERPL-MCNC: 2 MG/DL (ref 0.55–1.3)
ERYTHROCYTE [DISTWIDTH] IN BLOOD BY AUTOMATED COUNT: 15.6 % (ref 11.6–14.8)
GLOBULIN SER-MCNC: 4.5 G/DL
HCT VFR BLD CALC: 39.7 % (ref 37–47)
HDLC SERPL-MCNC: 40 MG/DL (ref 40–60)
HGB BLD-MCNC: 12.3 G/DL (ref 12–16)
MCV RBC AUTO: 91 FL (ref 80–99)
PLATELET # BLD: 547 K/UL (ref 150–450)
POTASSIUM SERPL-SCNC: 3.6 MMOL/L (ref 3.5–5.1)
RBC # BLD AUTO: 4.39 M/UL (ref 4.2–5.4)
SODIUM SERPL-SCNC: 144 MMOL/L (ref 136–145)
TRIGL SERPL-MCNC: 104 MG/DL (ref 30–150)
WBC # BLD AUTO: 13.1 K/UL (ref 4.8–10.8)

## 2019-07-27 RX ADMIN — METOPROLOL TARTRATE SCH MG: 50 TABLET, FILM COATED ORAL at 23:32

## 2019-07-27 RX ADMIN — IPRATROPIUM BROMIDE AND ALBUTEROL SULFATE SCH ML: .5; 3 SOLUTION RESPIRATORY (INHALATION) at 13:00

## 2019-07-27 RX ADMIN — ATORVASTATIN CALCIUM SCH MG: 80 TABLET, FILM COATED ORAL at 21:06

## 2019-07-27 RX ADMIN — HYDRALAZINE HYDROCHLORIDE SCH MG: 50 TABLET ORAL at 21:06

## 2019-07-27 RX ADMIN — INSULIN ASPART SCH UNITS: 100 INJECTION, SOLUTION INTRAVENOUS; SUBCUTANEOUS at 17:08

## 2019-07-27 RX ADMIN — PANTOPRAZOLE SODIUM SCH MG: 40 INJECTION, POWDER, FOR SOLUTION INTRAVENOUS at 10:23

## 2019-07-27 RX ADMIN — APIXABAN SCH MG: 5 TABLET, FILM COATED ORAL at 08:47

## 2019-07-27 RX ADMIN — INSULIN DETEMIR SCH UNITS: 100 INJECTION, SOLUTION SUBCUTANEOUS at 08:57

## 2019-07-27 RX ADMIN — HYDRALAZINE HYDROCHLORIDE SCH MG: 50 TABLET ORAL at 05:57

## 2019-07-27 RX ADMIN — INSULIN ASPART SCH UNITS: 100 INJECTION, SOLUTION INTRAVENOUS; SUBCUTANEOUS at 13:12

## 2019-07-27 RX ADMIN — IPRATROPIUM BROMIDE AND ALBUTEROL SULFATE SCH ML: .5; 3 SOLUTION RESPIRATORY (INHALATION) at 19:31

## 2019-07-27 RX ADMIN — INSULIN ASPART SCH UNITS: 100 INJECTION, SOLUTION INTRAVENOUS; SUBCUTANEOUS at 21:07

## 2019-07-27 RX ADMIN — METOPROLOL TARTRATE SCH MG: 50 TABLET, FILM COATED ORAL at 18:40

## 2019-07-27 RX ADMIN — INSULIN ASPART SCH UNITS: 100 INJECTION, SOLUTION INTRAVENOUS; SUBCUTANEOUS at 05:09

## 2019-07-27 RX ADMIN — HYDRALAZINE HYDROCHLORIDE SCH MG: 50 TABLET ORAL at 14:10

## 2019-07-27 RX ADMIN — METHYLPREDNISOLONE SODIUM SUCCINATE SCH MG: 40 INJECTION, POWDER, LYOPHILIZED, FOR SOLUTION INTRAMUSCULAR; INTRAVENOUS at 05:07

## 2019-07-27 RX ADMIN — INSULIN ASPART SCH UNITS: 100 INJECTION, SOLUTION INTRAVENOUS; SUBCUTANEOUS at 08:58

## 2019-07-27 RX ADMIN — IPRATROPIUM BROMIDE AND ALBUTEROL SULFATE SCH ML: .5; 3 SOLUTION RESPIRATORY (INHALATION) at 01:11

## 2019-07-27 RX ADMIN — INSULIN ASPART SCH UNITS: 100 INJECTION, SOLUTION INTRAVENOUS; SUBCUTANEOUS at 01:18

## 2019-07-27 RX ADMIN — APIXABAN SCH MG: 5 TABLET, FILM COATED ORAL at 18:35

## 2019-07-27 RX ADMIN — METHYLPREDNISOLONE SODIUM SUCCINATE SCH MG: 40 INJECTION, POWDER, LYOPHILIZED, FOR SOLUTION INTRAMUSCULAR; INTRAVENOUS at 14:09

## 2019-07-27 RX ADMIN — IPRATROPIUM BROMIDE AND ALBUTEROL SULFATE SCH ML: .5; 3 SOLUTION RESPIRATORY (INHALATION) at 06:57

## 2019-07-27 RX ADMIN — INSULIN DETEMIR SCH UNITS: 100 INJECTION, SOLUTION SUBCUTANEOUS at 18:40

## 2019-07-27 RX ADMIN — ASPIRIN 81 MG SCH MG: 81 TABLET ORAL at 08:47

## 2019-07-27 NOTE — NUR
NURSE NOTES:

Glucose noted to be 190, NovoLog coverage provided. Scheduled PM meds given. NAD at this 
time. Will continue to monitor.

## 2019-07-27 NOTE — NUR
NURSE NOTES:

Repositioned patient given oral care. Patient remains awake and alert at this time. 104/54, 
HR 84-92 Afib. 98% SpO2. Patient tolerating ventilator settings. No acute distress at this 
time.

## 2019-07-27 NOTE — NUR
NURSE NOTES:

Noted troponin value of 0.147 which is higher than the previous value of 0.070 this morning. 
Left message for Dr Coker on his emergency line. Patient continues to have small runs of V. 
tach. MD aware.

## 2019-07-27 NOTE — NUR
RESPIRATORY NOTE: Received patient on ordered vent settings. Pt airway is patent and 
secured. Suctioned pt prn. Vent alarms are on and audible. Vent is plugged into red outlet. 
Will monitor pt progress.

## 2019-07-27 NOTE — CONSULTATION
DATE OF CONSULTATION:  07/26/2019

CARDIOLOGY CONSULTATION



CONSULTING PHYSICIAN:  Keon Coker M.D.



REFERRING PHYSICIAN:  Yakov Alva M.D.



REASON FOR CONSULTATION:  Respiratory failure with congestive heart

failure.



HISTORY OF PRESENT ILLNESS:  This 74-year-old female was admitted to the

hospital with shortness of breath.  She was noted to have signs of acute

congestive heart failure.  She subsequently deteriorated with regard to

respiratory parameters and required intubation and mechanical ventilation.

I have been asked to assist with further cardiovascular care.



The patient had an echocardiogram performed earlier today that revealed

an ejection fraction in the range of 45% with areas of hypokinesis and

mild valvular regurgitation.



PAST MEDICAL HISTORY:  Permanent pacemaker, paroxysmal atrial fibrillation,

sick sinus syndrome, coronary artery disease, history of coronary stent,

hypertension with hypertensive heart disease and congestive heart failure,

COPD, sleep apnea, anemia of chronic kidney disease, type 2 diabetes

mellitus, diabetic nephropathy, hyperlipidemia, degenerative disk disease,

peripheral neuropathy, obesity, diabetic retinopathy, and vitamin D

deficiency.  Prior cholecystectomy.



ALLERGIES:  Sulfa.



ADVANCED DIRECTIVES:  DNR.



MEDICATIONS:  Prior to admission, reviewed and reconciled.  Current

medications reviewed in the MAR.



FAMILY HISTORY:  Noncontributory.



SOCIAL HISTORY:  No record of smoking or alcohol use.



REVIEW OF SYSTEMS:  Cannot be obtained from the patient at this time.

Pertinent data from records is outlined above after extensive review.



PHYSICAL EXAMINATION:

GENERAL:  She is orally intubated.  Mechanically ventilated.

VITAL SIGNS:  Blood pressure 151/79, heart rate 121, respiratory rate 22,

and temperature 98.2.

HEENT:  Orally intubated.  Mechanically ventilated.

NECK:  Thin trach secretions.

LUNGS:  Bilateral rales.  Accessory muscle use.

HEART:  Irregularly irregular rhythm.  Normal S1, increased splitting S2.

Respiratory sounds obscure further cardiac evaluation and murmur cannot be

auscultated.

ABDOMEN:  Soft and nontender.

EXTREMITIES:  1+ dependent edema.

NEUROLOGIC:  She is able to respond to painful stimuli with symmetric

movement of the extremities.



DIAGNOSTIC DATA:  EKG reveals atrial fibrillation/flutter, right bundle and

left anterior _____ hemiblock.



LABORATORY DATA:  Notable for sodium 138, potassium 6.6 repeated 4.6,

bicarb 38, BUN 26, and creatinine 1.1.  Glucose 424.  Magnesium 1.7.

Troponin 1 is 0.012, troponin 2 is 0.024.  Pro-natriuretic peptide

initially was 4000, now 5700.  Albumin 3.3.  White count 9.9, hemoglobin

11.7.  ABG pre-intubation 7.24, 120, 51.



IMPRESSION:

1. Acute respiratory failure.

2. Acute respiratory acidosis.

3. Acute on chronic diastolic and systolic congestive heart failure.

4. Ischemic cardiomyopathy.

5. Hypertensive heart disease.

6. Insulin requiring diabetes with multiple complications.

7. Mild protein-calorie malnutrition.

8. Mild hypomagnesemia.

9. Paroxysmal atrial fibrillation and flutter.

10. Permanent pacemaker.



PLAN:

1. ICU unit monitoring and care.

2. Intravenous diuresis.

3. Titration of anti-failure and antihypertensive.

4. Monitor potassium and replace accordingly IV magnesium at this

time.

5. Full anticoagulation with apixaban for cardioembolic prophylaxis.

6. Sputum culture.

7. Empiric antimicrobials.

8. Condition is critical.

9. Prognosis guarded.

10. Follow up lipid panel, chemistry panel, thyroid function, and trend

natriuretic peptide assay.









  ______________________________________________

  Keon Coker M.D.





DR:  DELIA

D:  07/26/2019 23:53

T:  07/27/2019 00:11

JOB#:  [id number]

CC:

## 2019-07-27 NOTE — NUR
HAND-OFF: 

Report given to GEN York. Dr Main notifed regarding low urine output. Dr Coker notified 
regarding episode of 5 beats of V tach and elevated troponin. Endorsed to follow up.

## 2019-07-27 NOTE — NUR
NURSE NOTES:

Notified Dr Alva over the phone regarding ABG results of pH 7.686, pCO2 27.4, PO2 121.1, 
HCO3 32.1, O2 98.6, and base excess 12. He reported that Dr Main is covering for him 
today and ordered change of ventilator setting to IMV/SIMV 12 with tidal volume 400.

## 2019-07-27 NOTE — NUR
NURSE NOTES:

Patient continues to be restless and throwing right leg off the bed. Family reports that the 
patient is complaining of sacral pain. Patient pulled up, cleaned, and repositioned at this 
time with pillows under both sides. Patient reports that she is comfortable. Patient follows 
commands and continues to answer yes and no questions. Oral care provided. Heart rate 113, 
blood pressure 130/68, temp 99.6, SpO2 100%, RR 20. Patient's eyes PERRLA bilaterally. 
Patient withdraws to pain. Patient's feet 4/5 strength. right arm 4/5 strength and left arm 
3/5 strength. Patient ventilator set at SIMV 12, tidal volume 400, FiO2 40%, and pressure 
support 12. Patient tolerating setting with oxygen saturation 100% and RR 20. Patient right 
nares NGT that is patent and verified with aspiration and auscultation at this time. Dr Alva and Dr Main are aware of the ABG result. Right antecubital 20 gauge peripheral IV 
and a left antecubital 18 gauge IV patent, asymptomatic, and saline locked at this time. 
skin intact. Patient showing sinus tachycardia on the monitor with occasional runs of PVCs 
and atrial pacing. Awaiting call back from Dr Coker regarding elevated troponin value. 
Patient showing no sign of acute distress. Bed in low position with bed alarm on and call 
light in reach at this time.

-------------------------------------------------------------------------------

Addendum: 07/27/19 at 1910 by Sheryl Black RN

-------------------------------------------------------------------------------

Capnography shows 42mmHg CO2

## 2019-07-27 NOTE — PULMONOLGY CRITICAL CARE NOTE
Critical Care - Asmt/Plan


Assessment/Plan:


1. Congestive heart failure.


2. Diabetes, out of control.


3. Altered mental status with evidence of old cerebral infarct.


4. COPD.


5. Sleep apnea.


6. Atrial fibrillation, not on anticoagulants.


7. Coronary heart disease.


8. Aortic atherosclerosis.





PLAN:  


weaning protocol to start in am


abx


nebs


rate control


wound care


avoid narcotics


cxr and labs in the am


trending trops as well


Respiratory:  CXR, ABG, weaning trial


Cardiac:  continue to monitor HR/BP


Infectious Disease:  check cultures, continue antibiotics


Gastrointestinal:  continue feedings/current rate


Prophylaxis:  Protonix


Disposition:  keep in ICU


Time Spent (Minutes):  40


Notes Reviewed:  internist


Discussed with:  nurses





Critical Care - Objective





Last 24 Hour Vital Signs








  Date Time  Temp Pulse Resp B/P (MAP) Pulse Ox O2 Delivery O2 Flow Rate FiO2


 


7/27/19 21:06    130/72    


 


7/27/19 20:00        40


 


7/27/19 20:00 99.3 81 21 124/75 (91) 100   


 


7/27/19 20:00      Mechanical Ventilator  


 


7/27/19 20:00  82      


 


7/27/19 19:41  85 16  100 Mechanical Ventilator  40


 


7/27/19 19:33  81 16     40


 


7/27/19 19:31  84 16  100 Mechanical Ventilator  40


 


7/27/19 19:00  84 19 129/76 (93) 100   


 


7/27/19 18:40  113  129/58    


 


7/27/19 18:00  107 24 129/58 (81) 99   


 


7/27/19 17:00  110 19 130/58 (82) 99   


 


7/27/19 16:51  126 20     40


 


7/27/19 16:00  112      


 


7/27/19 16:00        40


 


7/27/19 16:00  110 20 130/68 (88) 99   


 


7/27/19 16:00 99.6 110 20 130/68 (88) 99   


 


7/27/19 16:00      Mechanical Ventilator  


 


7/27/19 15:00  110 20 125/59 (81) 99   


 


7/27/19 14:49  128 20     40


 


7/27/19 14:10    118/57    


 


7/27/19 14:00 99.1 106 20 118/57 (77) 99   


 


7/27/19 13:33 99.1       


 


7/27/19 13:21  122 20     40


 


7/27/19 13:21  122 20  100 Mechanical Ventilator  40


 


7/27/19 13:21        40


 


7/27/19 13:00  120 20 123/64 (83) 100   


 


7/27/19 12:00        40


 


7/27/19 12:00  120      


 


7/27/19 12:00 100.6 121 19 130/66 (87) 100   


 


7/27/19 12:00      Mechanical Ventilator  


 


7/27/19 11:01  120 20     40


 


7/27/19 11:00  119 18 129/67 (87) 100   


 


7/27/19 10:00  119 20 122/57 (78) 100   


 


7/27/19 09:20  123 20     40


 


7/27/19 09:00  118 20 133/68 (89) 100   


 


7/27/19 08:48  119  131/72    


 


7/27/19 08:48  120  131/72    


 


7/27/19 08:47    131/72    


 


7/27/19 08:00  120      


 


7/27/19 08:00 99.2 120 20 120/71 (87) 100   


 


7/27/19 08:00      Mechanical Ventilator  


 


7/27/19 08:00        40


 


7/27/19 07:00  120 20 101/57 (72) 100   


 


7/27/19 06:55  124 20  100 Mechanical Ventilator  40


 


7/27/19 06:55  124 20     40


 


7/27/19 06:55        40


 


7/27/19 06:00  115 20 104/68 (80) 100   


 


7/27/19 05:57    104/68    


 


7/27/19 05:13  120 20     40


 


7/27/19 05:00  121 20 105/61 (76) 100   


 


7/27/19 04:00 99.1 120 20 93/58 (70) 100   


 


7/27/19 04:00        40


 


7/27/19 04:00  124      


 


7/27/19 04:00      Mechanical Ventilator  


 


7/27/19 03:03  120 20     40


 


7/27/19 03:00  121 20 130/60 (83) 100   


 


7/27/19 02:00  123 20 127/62 (83) 100   


 


7/27/19 01:20  120 20  100 Mechanical Ventilator  40


 


7/27/19 01:10  118 20  100 Mechanical Ventilator  40


 


7/27/19 01:09  118 20     40


 


7/27/19 01:00  120 20 106/58 (74) 99   


 


7/27/19 00:00      Mechanical Ventilator  


 


7/27/19 00:00 99.0 122 20 118/63 (81) 99   


 


7/27/19 00:00        40


 


7/27/19 00:00  122      


 


7/26/19 23:00  123 20 105/57 (73) 100   


 


7/26/19 23:00  126 20     40


 


7/26/19 22:20    126/64    


 


7/26/19 22:00  121 20 124/65 (84) 100   








Status:  awake


Condition:  critical, improving


Lungs:  rhonchi


Heart:  HR/BP stable


Abdomen:  soft, non-tender


Extremities:  edema


Micro:





Microbiology








 Date/Time


Source Procedure


Growth Status


 


 


 7/26/19 16:20


Sputum Gram Stain - Final Resulted


 


 7/26/19 16:20


Sputum Sputum Culture


Pending Resulted


 


 7/25/19 23:30


Urine,Clean Catch Urine Culture - Preliminary


Gram Negative Ernie Resulted


 


 7/26/19 04:30


Rectum  Received








Accucheck:  190





Critical Care - Subjective


ROS Limited/Unobtainable:  Yes


Condition:  critical


FI02:  40


Vent Support Breath Rate:  12


Vent Support Mode:  IMV/SIMV


Vent Tidal Volume:  400


Sputum Amount:  Small


PEEP:  5.0


PIP:  28


I&O:











Intake and Output  


 


 7/26/19 7/27/19





 19:00 07:00


 


Intake Total  200 ml


 


Output Total 495 ml 350 ml


 


Balance -495 ml -150 ml


 


  


 


Intake IV Total  200 ml


 


Output Urine Total 495 ml 350 ml








Subjective:


more awwake


ddoing well on current imv settings


positive uop


no cp nv or bleeding


toelrating tf


ET-Tube:  7.5


ET Position:  20


Labs:





Current Medications








 Medications


  (Trade)  Dose


 Ordered  Sig/Michele


 Route


 PRN Reason  Start Time


 Stop Time Status Last Admin


Dose Admin


 


 Acetaminophen


  (Tylenol)  650 mg  Q6H  PRN


 NG


 Mild Pain/Temp > 100.5  7/27/19 12:45


 8/26/19 12:44  7/27/19 13:03


 


 


 Albuterol/


 Ipratropium


  (Albuterol/


 Ipratropium)  3 ml  Q6HRT


 HHN


   7/26/19 13:00


 7/31/19 08:59  7/27/19 19:31


 


 


 Amlodipine


 Besylate


  (Norvasc)  5 mg  DAILY


 ORAL


   7/27/19 09:00


 8/26/19 08:59  7/27/19 08:48


 


 


 Apixaban


  (Eliquis)  5 mg  BID


 ORAL


   7/26/19 18:00


 8/25/19 08:59  7/27/19 18:35


 


 


 Aspirin


  (ASA)  81 mg  DAILY


 ORAL


   7/27/19 09:00


 8/25/19 08:59  7/27/19 08:47


 


 


 Atorvastatin


 Calcium


  (Lipitor)  80 mg  BEDTIME


 ORAL


   7/26/19 21:00


 8/25/19 20:59  7/27/19 21:06


 


 


 Clopidogrel


 Bisulfate


  (Plavix)  75 mg  DAILY


 ORAL


   7/27/19 09:00


 8/25/19 08:59  7/27/19 08:46


 


 


 Hydralazine HCl


  (Apresoline)  50 mg  EVERY 8  HOURS


 ORAL


   7/26/19 14:00


 8/25/19 05:59  7/27/19 21:06


 


 


 Insulin Aspart


  (NovoLOG)    EVERY 4  HOURS


 SUBQ


   7/26/19 21:00


 8/25/19 06:29  7/27/19 21:07


 


 


 Insulin Detemir


  (Levemir)  16 units  BID


 SUBQ


   7/27/19 09:00


 8/25/19 20:59  7/27/19 18:40


 


 


 Lorazepam


  (Ativan 2mg/ml


 1ml)  1 mg  Q2H  PRN


 IV


 For Anxiety  7/26/19 17:15


 8/2/19 17:14  7/26/19 17:42


 


 


 Methylprednisolone


 Sodium Succinate


  (Solu-MEDROL)  40 mg  DAILY


 IVP


   7/28/19 09:00


 8/27/19 08:59   


 


 


 Metoprolol


 Tartrate


  (Lopressor)  50 mg  Q6HR


 ORAL


   7/27/19 18:30


 8/26/19 18:29  7/27/19 18:40


 


 


 Pantoprazole


  (Protonix)  40 mg  DAILY


 IVP


   7/26/19 20:45


 8/25/19 20:44  7/27/19 10:23


 








Laboratory Tests








Test


  7/26/19


21:40 7/27/19


06:30 7/27/19


06:53 7/27/19


11:45


 


Sodium Level


  144 MMOL/L


(136-145) 144 MMOL/L


(136-145) 


  


 


 


Potassium Level


  3.8 MMOL/L


(3.5-5.1) 3.6 MMOL/L


(3.5-5.1) 


  


 


 


Chloride Level


  100 MMOL/L


() 101 MMOL/L


() 


  


 


 


Carbon Dioxide Level


  37 MMOL/L


(21-32)  H 35 MMOL/L


(21-32)  H 


  


 


 


Anion Gap


  7 mmol/L


(5-15) 8 mmol/L


(5-15) 


  


 


 


Blood Urea Nitrogen


  34 mg/dL


(7-18)  H 43 mg/dL


(7-18)  H 


  


 


 


Creatinine


  1.4 MG/DL


(0.55-1.30)  H 2.0 MG/DL


(0.55-1.30)  H 


  


 


 


Estimat Glomerular Filtration


Rate  mL/min (>60)  


   mL/min (>60)  


  


  


 


 


Glucose Level


  289 MG/DL


()  #H 246 MG/DL


()  H 


  


 


 


Calcium Level


  9.7 MG/DL


(8.5-10.1) 9.5 MG/DL


(8.5-10.1) 


  


 


 


Magnesium Level


  1.7 MG/DL


(1.8-2.4)  L 


  


  


 


 


Troponin I


  0.024 ng/mL


(0.000-0.056) 0.070 ng/mL


(0.000-0.056) 


  0.147 ng/mL


(0.000-0.056)


 


Pro-B-Type Natriuretic Peptide


  5724 pg/mL


(0-125)  H 


  


  


 


 


White Blood Count


  


  13.1 K/UL


(4.8-10.8)  H 


  


 


 


Red Blood Count


  


  4.39 M/UL


(4.20-5.40) 


  


 


 


Hemoglobin


  


  12.3 G/DL


(12.0-16.0) 


  


 


 


Hematocrit


  


  39.7 %


(37.0-47.0) 


  


 


 


Mean Corpuscular Volume  91 FL (80-99)    


 


Mean Corpuscular Hemoglobin


  


  28.0 PG


(27.0-31.0) 


  


 


 


Mean Corpuscular Hemoglobin


Concent 


  30.9 G/DL


(32.0-36.0)  L 


  


 


 


Red Cell Distribution Width


  


  15.6 %


(11.6-14.8)  H 


  


 


 


Platelet Count


  


  547 K/UL


(150-450)  H 


  


 


 


Mean Platelet Volume


  


  5.4 FL


(6.5-10.1)  L 


  


 


 


Neutrophils (%) (Auto)


  


  % (45.0-75.0)


  


  


 


 


Lymphocytes (%) (Auto)


  


  % (20.0-45.0)


  


  


 


 


Monocytes (%) (Auto)   % (1.0-10.0)    


 


Eosinophils (%) (Auto)   % (0.0-3.0)    


 


Basophils (%) (Auto)   % (0.0-2.0)    


 


Differential Total Cells


Counted 


  100  


  


  


 


 


Neutrophils % (Manual)  94 % (45-75)  H  


 


Lymphocytes % (Manual)  3 % (20-45)  L  


 


Monocytes % (Manual)  3 % (1-10)    


 


Eosinophils % (Manual)  0 % (0-3)    


 


Basophils % (Manual)  0 % (0-2)    


 


Band Neutrophils  0 % (0-8)    


 


Platelet Estimate  Increased  H  


 


Platelet Morphology  Normal    


 


Hypochromasia  1+    


 


Anisocytosis  1+    


 


Total Bilirubin


  


  0.6 MG/DL


(0.2-1.0) 


  


 


 


Aspartate Amino Transf


(AST/SGOT) 


  18 U/L (15-37)


  


  


 


 


Alanine Aminotransferase


(ALT/SGPT) 


  14 U/L (12-78)


  


  


 


 


Alkaline Phosphatase


  


  130 U/L


()  H 


  


 


 


Total Protein


  


  7.4 G/DL


(6.4-8.2) 


  


 


 


Albumin


  


  2.9 G/DL


(3.4-5.0)  L 


  


 


 


Globulin  4.5 g/dL    


 


Albumin/Globulin Ratio


  


  0.6 (1.0-2.7)


L 


  


 


 


Triglycerides Level


  


  104 MG/DL


() 


  


 


 


Cholesterol Level


  


  131 MG/DL (<


200) 


  


 


 


LDL Cholesterol


  


  70 mg/dL


(<100) 


  


 


 


HDL Cholesterol


  


  40 MG/DL


(40-60) 


  


 


 


Cholesterol/HDL Ratio  3.3 (3.3-4.4)    


 


Thyroid Stimulating Hormone


(TSH) 


  1.989 uiU/mL


(0.358-3.740) 


  


 


 


Arterial Blood pH


  


  


  7.686


(7.350-7.450) 


 


 


Arterial Blood Partial


Pressure CO2 


  


  27.4 mmHg


(35.0-45.0)  L 


 


 


Arterial Blood Partial


Pressure O2 


  


  121.1 mmHg


(75.0-100.0)  H 


 


 


Arterial Blood HCO3


  


  


  32.1 mmol/L


(22.0-26.0)  H 


 


 


Arterial Blood Oxygen


Saturation 


  


  98.6 %


() 


 


 


Arterial Blood Base Excess   12.0 (-2-2)  *H 


 


Jean Pierre Test   Positive   

















Magdalena Main DO Jul 27, 2019 21:24

## 2019-07-27 NOTE — NUR
NURSE NOTES:

Received patient from GEN Cain. Patient restless and throwing right leg off the bed. Patient 
pulled up and repositioned at this time. Patient does not have her eyes open at this time. 
Patient's eyes were cleaned and oral care provided. Heart rate 120, blood pressure 101/57, 
temp 99.2, SpO2 100%, RR 20. Patient's eyes PERRLA bilaterally. Patient does not respond to 
voice at this time. Patient sleeping. Patient withdraws to pain. Patient's feet 4/5 
strength. right arm 4/5 strength and left arm 3/5 strength. Patient has endotracheal tube 
with 20cm at the lip line. Patient ventilator set at assist/control 20, tidal volume 500, 
FiO2 40%, and PEEP 5. Patient tolerating setting with oxygen saturation 99%. Patient has a 
right nares NGT that is patent and verified with aspiration and auscultation at this time. 
Patient NPO awaiting nutritional consult. Patient has abnormal ABG results this morning. 
Will notify Dr Alva. Patient skin intact at this time. Patient has a right antecubital 20 
gauge peripheral IV and a left antecubital 18 gauge IV. Both are patent, asymptomatic, and 
saline locked at this time. skin intact. Patient received 2g Magnesium yesterday for low 
serum magnesium. No repeat lab drawn this morning. Patient showing sinus tachycardia on the 
monitor with occasional runs of PVCs and atrial pacing. Patient showing no sign of acute 
distress. Patient has an order for venous duplex today. Will follow up with radiology. Bed 
in low position with bed alarm on and call light in reach at this time.

## 2019-07-27 NOTE — NUR
NURSE NOTES:

Awaiting for Dr Coker to assess patient bedside.  LM on MD emergency line regarding the 
metoprolol succinate extended release tablet that was not given because it could not be 
crushed and patient had an episode of 5 beats of V tach which occurred at 1029 this morning. 
Will endorse to the PM RN to show the EKG strip to MD when he arrives this evening and to 
get new order for med that is not extended release.

## 2019-07-27 NOTE — NUR
CASE MANAGEMENT: REVIEW 



7/27/2019



SI: CHF. UNCONTROLLED DM

T 99.1  RR 20 B/P 118/57 SATS 99% ON MECH VENT FIO2 40 

WBC 13.1 CO2 37 BUN 43 CR 2   TROPONIN 0.070

ABGs pH 7.686 pCO2 27.4 pO2 121.1 HCO3 32.1 BE 12 



IS: INSULIN ASPART SUBQ Q4H 

HYDRALAZINE PO Q8H 

SOLU MEDROL IV Q8H 

LASIX IV Q8H 

LIPITOR PO QHS 

ELIQUIS PO BID 

LEVEMIR SUBQ BID 

ASA PO QD

PLAVIX PO QD

LISINOPRIL PO QD

PROTONIX IV QD

NORVASC PO QD



**: ICU STATUS

## 2019-07-27 NOTE — NUR
NURSE NOTES:

Called Dr Coker and was connected with Dr Saldivar through their emergency line as he is the 
doctor on call. I reported that I was attempting to talk to Dr Coker and he reported that 
he is not covering for Dr Coker with this patient. I reported the troponin of 0.070 to Dr Saldivar and he said that he would pass the message on to Dr Coker.

## 2019-07-27 NOTE — NUR
NURSE NOTES:

Dr. Main came in to round on patient.  placed in morning lab orders and mentioned to 
go ahead with CPAP weaning protocols to be done in the morning. No other new orders. Patient 
is awake and alert during rounding.

## 2019-07-27 NOTE — NUR
NURSE NOTES:

Unable to give metoprolol this morning. This medication cannot be crushed in the form that 
has been ordered. Will notify Dr Coker when he rounds on the patient.

## 2019-07-27 NOTE — GENERAL PROGRESS NOTE
Assessment/Plan


Problem List:  


(1) Bacteriuria


ICD Codes:  R82.71 - Bacteriuria


SNOMED:  78920790


(2) Uncontrolled diabetes mellitus


ICD Codes:  E11.65 - Type 2 diabetes mellitus with hyperglycemia


SNOMED:  21004655, 948907856


Qualifiers:  


   Qualified Codes:  E11.65 - Type 2 diabetes mellitus with hyperglycemia


(3) Acute exacerbation of CHF (congestive heart failure)


ICD Codes:  I50.9 - Heart failure, unspecified


SNOMED:  646974079, [id number]


Qualifiers:  


   Qualified Codes:  I50.9 - Heart failure, unspecified


Assessment/Plan:


change Levemir to 16 units bid 


continue NISS every 4 hours





Subjective


ROS Limited/Unobtainable:  Yes


Allergies:  


Coded Allergies:  


     SULFA (SULFONAMIDE ANTIBIOTICS) (Unverified  Allergy, Unknown, 7/26/19)


Uncoded Allergies:  


     SULFA (Allergy, Unknown, 7/25/19)


Subjective


intubated in ICU 


glucose values improved still on higher side 


on steroid 











Item Value  Date Time


 


Bedside Blood Glucose 217 mg/dl H 7/27/19 0607


 


Bedside Blood Glucose 185 mg/dl H 7/27/19 0118


 


Bedside Blood Glucose 244 mg/dl H 7/26/19 2133


 


Bedside Blood Glucose 211 mg/dl H 7/26/19 1630


 


Bedside Blood Glucose 196 mg/dl H 7/26/19 1130


 


Bedside Blood Glucose 351 mg/dl H 7/26/19 0646











Objective





Last 24 Hour Vital Signs








  Date Time  Temp Pulse Resp B/P (MAP) Pulse Ox O2 Delivery O2 Flow Rate FiO2


 


7/27/19 06:55  124 20  100 Mechanical Ventilator  40


 


7/27/19 06:55  124 20     40


 


7/27/19 06:55        40


 


7/27/19 06:00  115 20 104/68 (80) 100   


 


7/27/19 05:57    104/68    


 


7/27/19 05:13  120 20     40


 


7/27/19 05:00  121 20 105/61 (76) 100   


 


7/27/19 04:00 99.1 120 20 93/58 (70) 100   


 


7/27/19 04:00        40


 


7/27/19 04:00  124      


 


7/27/19 04:00      Mechanical Ventilator  


 


7/27/19 03:03  120 20     40


 


7/27/19 03:00  121 20 130/60 (83) 100   


 


7/27/19 02:00  123 20 127/62 (83) 100   


 


7/27/19 01:20  120 20  100 Mechanical Ventilator  40


 


7/27/19 01:10  118 20  100 Mechanical Ventilator  40


 


7/27/19 01:09  118 20     40


 


7/27/19 01:00  120 20 106/58 (74) 99   


 


7/27/19 00:00      Mechanical Ventilator  


 


7/27/19 00:00 99.0 122 20 118/63 (81) 99   


 


7/27/19 00:00        40


 


7/27/19 00:00  122      


 


7/26/19 23:00  123 20 105/57 (73) 100   


 


7/26/19 23:00  126 20     40


 


7/26/19 22:20    126/64    


 


7/26/19 22:00  121 20 124/65 (84) 100   


 


7/26/19 21:05  122 20     40


 


7/26/19 21:00  121 21 131/68 (89) 99   


 


7/26/19 20:00  121      


 


7/26/19 20:00        40


 


7/26/19 20:00 99.1 116 20 153/88 (109) 100   


 


7/26/19 20:00      Mechanical Ventilator  


 


7/26/19 19:00  121 20 167/82 (110) 100   


 


7/26/19 18:59      Mechanical Ventilator  40


 


7/26/19 18:59      Mechanical Ventilator  40


 


7/26/19 18:57  124 20     40


 


7/26/19 18:00  121 22 151/79 (103) 98   


 


7/26/19 17:11  108 20     40


 


7/26/19 17:00 98.2 122 27 165/52 (89) 41   


 


7/26/19 16:34  125 20  100 Mechanical Ventilator 60.0 40


 


7/26/19 16:28  123 20     40


 


7/26/19 16:00  123      


 


7/26/19 16:00        40


 


7/26/19 16:00  123 20 117/63 (81) 100   


 


7/26/19 15:00  123 15 148/70 (96) 98   


 


7/26/19 14:00  97 15 120/81 (94) 99   


 


7/26/19 13:00  96 16 123/76 (92) 99   


 


7/26/19 12:52  123 18  98 Facial  40


 


7/26/19 12:51      Bi-Pap  40


 


7/26/19 12:51  123    Bi-Pap  40


 


7/26/19 12:00        40


 


7/26/19 12:00  85      


 


7/26/19 12:00 98.0 89 17 125/75 (92) 99   


 


7/26/19 11:15        40


 


7/26/19 11:00  87 14 136/76 (96) 91   


 


7/26/19 10:35  91 14  94 Facial  30


 


7/26/19 10:00  96 14 121/67 (85) 93   


 


7/26/19 09:50  98 16  96 Facial  30


 


7/26/19 09:50  98 16  96 Bi-Pap  30


 


7/26/19 09:30 98.0 95 20 135/53 (80) 90   


 


7/26/19 09:00      Venturi Mask 6.0 


 


7/26/19 08:00 97.7 104 24 161/83 (109) 93   

















Intake and Output  


 


 7/26/19 7/27/19





 18:59 06:59


 


Intake Total  200 ml


 


Output Total 460 ml 365 ml


 


Balance -460 ml -165 ml


 


  


 


Intake IV Total  200 ml


 


Output Urine Total 460 ml 365 ml








Laboratory Tests


7/26/19 08:40: 


Arterial Blood pH 7.268L, Arterial Blood Partial Pressure CO2 112.1*H, Arterial 

Blood Partial Pressure O2 79.1, Arterial Blood HCO3 50.1*H, Arterial Blood 

Oxygen Saturation 93.8L, Arterial Blood Base Excess 18.1*H, Jean Pierre Test Positive


7/26/19 15:00: 


Arterial Blood pH 7.245*L, Arterial Blood Partial Pressure CO2 121.6*H, 

Arterial Blood Partial Pressure O2 98.9, Arterial Blood HCO3 51.5*H, Arterial 

Blood Oxygen Saturation 96.3, Arterial Blood Base Excess 18.7*H, Jean Pierre Test 

Positive


7/26/19 17:25: 


Arterial Blood pH 7.527H, Arterial Blood Partial Pressure CO2 44.4, Arterial 

Blood Partial Pressure O2 85.2, Arterial Blood HCO3 36.0H, Arterial Blood 

Oxygen Saturation 97.1, Arterial Blood Base Excess 11.9*H, Jean Pierre Test Positive


7/26/19 21:40: 


Sodium Level 144, Potassium Level 3.8, Chloride Level 100, Carbon Dioxide Level 

37H, Anion Gap 7, Blood Urea Nitrogen 34H, Creatinine 1.4H, Estimat Glomerular 

Filtration Rate , Glucose Level 289#H, Calcium Level 9.7, Magnesium Level 1.7L, 

Troponin I 0.024, Pro-B-Type Natriuretic Peptide 5724H


7/27/19 06:30: 


White Blood Count 13.1H, Red Blood Count 4.39, Hemoglobin 12.3, Hematocrit 39.7

, Mean Corpuscular Volume 91, Mean Corpuscular Hemoglobin 28.0, Mean 

Corpuscular Hemoglobin Concent 30.9L, Red Cell Distribution Width 15.6H, 

Platelet Count 547H, Mean Platelet Volume 5.4L, Neutrophils (%) (Auto) , 

Lymphocytes (%) (Auto) , Monocytes (%) (Auto) , Eosinophils (%) (Auto) , 

Basophils (%) (Auto) , Neutrophils % (Manual) [Pending], Lymphocytes % (Manual) 

[Pending], Platelet Estimate [Pending], Platelet Morphology [Pending], Sodium 

Level [Pending], Potassium Level [Pending], Chloride Level [Pending], Carbon 

Dioxide Level [Pending], Blood Urea Nitrogen [Pending], Creatinine [Pending], 

Estimat Glomerular Filtration Rate [Pending], Glucose Level [Pending], Calcium 

Level [Pending], Total Bilirubin [Pending], Aspartate Amino Transf (AST/SGOT) [

Pending], Alanine Aminotransferase (ALT/SGPT) [Pending], Alkaline Phosphatase [

Pending], Troponin I [Pending], Total Protein [Pending], Albumin [Pending], 

Globulin [Pending], Triglycerides Level [Pending], Cholesterol Level [Pending], 

LDL Cholesterol [Pending], HDL Cholesterol [Pending], Cholesterol/HDL Ratio [

Pending], Thyroid Stimulating Hormone (TSH) [Pending]


7/27/19 06:53: 


Arterial Blood pH 7.686*H, Arterial Blood Partial Pressure CO2 27.4L, Arterial 

Blood Partial Pressure O2 121.1H, Arterial Blood HCO3 32.1H, Arterial Blood 

Oxygen Saturation 98.6, Arterial Blood Base Excess 12.0*H, Jean Pierre Test Positive


Height (Feet):  5


Height (Inches):  4.00


Weight (Pounds):  170


General Appearance:  severe distress


EENT:  other - ETT


Neck:  normal alignment


Cardiovascular:  tachycardia


Respiratory/Chest:  decreased breath sounds


Abdomen:  normal bowel sounds


Edema:  1+ Arm (L), 1+ Arm (R), 1+ Leg (L), 1+ Leg (R), 1+ Pedal (L), 1+ Pedal (

R), 1+ Generalized


Objective





Current Medications








 Medications


  (Trade)  Dose


 Ordered  Sig/Michele


 Route


 PRN Reason  Start Time


 Stop Time Status Last Admin


Dose Admin


 


 Albuterol/


 Ipratropium


  (Albuterol/


 Ipratropium)  3 ml  Q6HRT


 HHN


   7/26/19 13:00


 7/31/19 08:59  7/27/19 01:11


 


 


 Amlodipine


 Besylate


  (Norvasc)  5 mg  DAILY


 ORAL


   7/27/19 09:00


 8/26/19 08:59   


 


 


 Apixaban


  (Eliquis)  5 mg  BID


 ORAL


   7/26/19 18:00


 8/25/19 08:59   


 


 


 Aspirin


  (ASA)  81 mg  DAILY


 ORAL


   7/27/19 09:00


 8/25/19 08:59   


 


 


 Atorvastatin


 Calcium


  (Lipitor)  80 mg  BEDTIME


 ORAL


   7/26/19 21:00


 8/25/19 20:59  7/26/19 21:26


 


 


 Clopidogrel


 Bisulfate


  (Plavix)  75 mg  DAILY


 ORAL


   7/27/19 09:00


 8/25/19 08:59   


 


 


 Furosemide


  (Lasix)  40 mg  EVERY 8  HOURS


 IV


   7/27/19 06:00


 8/26/19 05:59  7/27/19 05:08


 


 


 Hydralazine HCl


  (Apresoline)  50 mg  EVERY 8  HOURS


 ORAL


   7/26/19 14:00


 8/25/19 05:59  7/27/19 05:57


 


 


 Insulin Aspart


  (NovoLOG)    EVERY 4  HOURS


 SUBQ


   7/26/19 21:00


 8/25/19 06:29  7/27/19 05:09


 


 


 Insulin Detemir


  (Levemir)  25 units  BEDTIME


 SUBQ


   7/26/19 21:00


 8/25/19 20:59  7/26/19 21:30


 


 


 Lisinopril


  (Prinivil)  40 mg  DAILY


 ORAL


   7/27/19 09:00


 8/25/19 08:59   


 


 


 Lorazepam


  (Ativan 2mg/ml


 1ml)  1 mg  Q2H  PRN


 IV


 For Anxiety  7/26/19 17:15


 8/2/19 17:14  7/26/19 17:42


 


 


 Methylprednisolone


 Sodium Succinate


  (Solu-MEDROL)  40 mg  EVERY 8  HOURS


 IVP


   7/26/19 22:00


 8/25/19 21:59  7/27/19 05:07


 


 


 Metoprolol


 Succinate


  (Toprol XL)  200 mg  DAILY


 ORAL


   7/27/19 09:00


 8/25/19 08:59   


 


 


 Pantoprazole


  (Protonix)  40 mg  DAILY


 IVP


   7/26/19 20:45


 8/25/19 20:44  7/26/19 21:27


 

















Paolo Mendoza MD Jul 27, 2019 07:24

## 2019-07-27 NOTE — NUR
NURSE NOTES:

Called Dr Main and notified her of ABG result this morning. Dr Alva did not order 
pressure support setting for SIMV of 12 and tidal volume 400 this morning. Dr Main 
ordered for ventilator setting of SIMV 12, , and pressure support 12. Order placed at 
this time.

## 2019-07-27 NOTE — NUR
NURSE NOTES:

Received patient from Wayne Saucedo RN. Patient reports that she is comfortable. Patient 
follows commands and able to answer yes and no questions. Oral care provided. Heart rate 83, 
blood pressure 129/76, temp 99.2, SpO2 100%, RR 20. Patient's eyes PERRLA bilaterally. 
Patient withdraws to pain. Patient's feet 4/5 strength. right arm 4/5 strength and left arm 
3/5 strength. Patient ventilator set at SIMV 12, tidal volume 400, FiO2 40%, and pressure 
support 12. Patient tolerating setting with oxygen saturation 100% and RR 20. Patient right 
nares NGT that is patent and verified with aspiration and auscultation at this time. Right 
antecubital 20 gauge peripheral IV and a left antecubital 18 gauge IV patent, asymptomatic, 
and saline locked at this time. skin intact, safety measures are in place. Family at 
bedside. Will continue to monitor.

## 2019-07-27 NOTE — NUR
NURSE NOTES:

Patient continues to be restless and throwing right leg off the bed. Patient pulled up and 
repositioned at this time. Patient has eyes open and is communicating with her family at 
this time. She is asking for water. It has been explained to her that she is not able to 
have anything by mouth at this time. Patient responds to questions with nodding head for yes 
and shaking head for no. Patient follows commands. Oral care provided. Heart rate 121, blood 
pressure 130/66, temp 100.6, SpO2 100%, RR 21. Patient's eyes PERRLA bilaterally. Patient 
withdraws to pain. Patient's feet 4/5 strength. right arm 4/5 strength and left arm 3/5 
strength. Patient ventilator set at SIMV 12, tidal volume 400, FiO2 40%, and pressure 
support 12. Patient tolerating setting with oxygen saturation 100%. Patient right nares NGT 
that is patent and verified with aspiration and auscultation at this time. Dr Alva and Dr Main are aware of the ABG result. Right antecubital 20 gauge peripheral IV and a left 
antecubital 18 gauge IV patent, asymptomatic, and saline locked at this time. skin intact. 
Patient showing sinus tachycardia on the monitor with occasional runs of PVCs and atrial 
pacing. Patient had a run of ventricular tachycardia at 1029. Dr Coker called, message 
left, awaiting call back regarding V tach and elevated serum troponin value of 0.07. Patient 
showing no sign of acute distress. Bed in low position with bed alarm on and call light in 
reach at this time.

-------------------------------------------------------------------------------

Addendum: 07/27/19 at 1911 by Sheryl Black RN

-------------------------------------------------------------------------------

Capnography shows 27mmHg CO2

## 2019-07-27 NOTE — NUR
NURSE NOTES:

Patient's urine output has steadily declined throughout the day. Bladder scan done with only 
14mL residual. LM for Dr Main. Awaiting call back. 

-------------------------------------------------------------------------------

Addendum: 07/27/19 at 1928 by Sheryl Black RN

-------------------------------------------------------------------------------

Patient cleaned, repositioned, and oral care provided at this time. Patient denies pain and 
showing no sign of acute distress. , /58, SpO2 99%, and RR 19. Capnography shows 
47mmHg CO2.

## 2019-07-28 VITALS — DIASTOLIC BLOOD PRESSURE: 57 MMHG | SYSTOLIC BLOOD PRESSURE: 121 MMHG

## 2019-07-28 VITALS — DIASTOLIC BLOOD PRESSURE: 66 MMHG | SYSTOLIC BLOOD PRESSURE: 133 MMHG

## 2019-07-28 VITALS — DIASTOLIC BLOOD PRESSURE: 58 MMHG | SYSTOLIC BLOOD PRESSURE: 112 MMHG

## 2019-07-28 VITALS — DIASTOLIC BLOOD PRESSURE: 79 MMHG | SYSTOLIC BLOOD PRESSURE: 146 MMHG

## 2019-07-28 VITALS — DIASTOLIC BLOOD PRESSURE: 54 MMHG | SYSTOLIC BLOOD PRESSURE: 109 MMHG

## 2019-07-28 VITALS — DIASTOLIC BLOOD PRESSURE: 81 MMHG | SYSTOLIC BLOOD PRESSURE: 153 MMHG

## 2019-07-28 VITALS — DIASTOLIC BLOOD PRESSURE: 74 MMHG | SYSTOLIC BLOOD PRESSURE: 125 MMHG

## 2019-07-28 VITALS — SYSTOLIC BLOOD PRESSURE: 125 MMHG | DIASTOLIC BLOOD PRESSURE: 64 MMHG

## 2019-07-28 VITALS — DIASTOLIC BLOOD PRESSURE: 63 MMHG | SYSTOLIC BLOOD PRESSURE: 118 MMHG

## 2019-07-28 VITALS — SYSTOLIC BLOOD PRESSURE: 105 MMHG | DIASTOLIC BLOOD PRESSURE: 57 MMHG

## 2019-07-28 VITALS — SYSTOLIC BLOOD PRESSURE: 103 MMHG | DIASTOLIC BLOOD PRESSURE: 54 MMHG

## 2019-07-28 VITALS — DIASTOLIC BLOOD PRESSURE: 68 MMHG | SYSTOLIC BLOOD PRESSURE: 141 MMHG

## 2019-07-28 VITALS — DIASTOLIC BLOOD PRESSURE: 62 MMHG | SYSTOLIC BLOOD PRESSURE: 150 MMHG

## 2019-07-28 VITALS — DIASTOLIC BLOOD PRESSURE: 63 MMHG | SYSTOLIC BLOOD PRESSURE: 114 MMHG

## 2019-07-28 VITALS — DIASTOLIC BLOOD PRESSURE: 76 MMHG | SYSTOLIC BLOOD PRESSURE: 126 MMHG

## 2019-07-28 VITALS — DIASTOLIC BLOOD PRESSURE: 61 MMHG | SYSTOLIC BLOOD PRESSURE: 119 MMHG

## 2019-07-28 VITALS — SYSTOLIC BLOOD PRESSURE: 126 MMHG | DIASTOLIC BLOOD PRESSURE: 61 MMHG

## 2019-07-28 VITALS — SYSTOLIC BLOOD PRESSURE: 99 MMHG | DIASTOLIC BLOOD PRESSURE: 57 MMHG

## 2019-07-28 VITALS — SYSTOLIC BLOOD PRESSURE: 125 MMHG | DIASTOLIC BLOOD PRESSURE: 62 MMHG

## 2019-07-28 VITALS — SYSTOLIC BLOOD PRESSURE: 115 MMHG | DIASTOLIC BLOOD PRESSURE: 59 MMHG

## 2019-07-28 VITALS — DIASTOLIC BLOOD PRESSURE: 60 MMHG | SYSTOLIC BLOOD PRESSURE: 122 MMHG

## 2019-07-28 VITALS — DIASTOLIC BLOOD PRESSURE: 71 MMHG | SYSTOLIC BLOOD PRESSURE: 140 MMHG

## 2019-07-28 VITALS — SYSTOLIC BLOOD PRESSURE: 131 MMHG | DIASTOLIC BLOOD PRESSURE: 64 MMHG

## 2019-07-28 VITALS — DIASTOLIC BLOOD PRESSURE: 69 MMHG | SYSTOLIC BLOOD PRESSURE: 134 MMHG

## 2019-07-28 LAB
ADD MANUAL DIFF: YES
ADD MANUAL DIFF: YES
ALBUMIN SERPL-MCNC: 2.9 G/DL (ref 3.4–5)
ALBUMIN/GLOB SERPL: 0.6 {RATIO} (ref 1–2.7)
ALP SERPL-CCNC: 127 U/L (ref 46–116)
ALT SERPL-CCNC: 13 U/L (ref 12–78)
ANION GAP SERPL CALC-SCNC: 12 MMOL/L (ref 5–15)
ANION GAP SERPL CALC-SCNC: 2 MMOL/L (ref 5–15)
AST SERPL-CCNC: 22 U/L (ref 15–37)
BILIRUB SERPL-MCNC: 0.5 MG/DL (ref 0.2–1)
BUN SERPL-MCNC: 67 MG/DL (ref 7–18)
BUN SERPL-MCNC: 70 MG/DL (ref 7–18)
CALCIUM SERPL-MCNC: 9.3 MG/DL (ref 8.5–10.1)
CALCIUM SERPL-MCNC: 9.7 MG/DL (ref 8.5–10.1)
CHLORIDE SERPL-SCNC: 101 MMOL/L (ref 98–107)
CHLORIDE SERPL-SCNC: 96 MMOL/L (ref 98–107)
CO2 SERPL-SCNC: 35 MMOL/L (ref 21–32)
CO2 SERPL-SCNC: 38 MMOL/L (ref 21–32)
CREAT SERPL-MCNC: 2.8 MG/DL (ref 0.55–1.3)
CREAT SERPL-MCNC: 2.9 MG/DL (ref 0.55–1.3)
ERYTHROCYTE [DISTWIDTH] IN BLOOD BY AUTOMATED COUNT: 15.2 % (ref 11.6–14.8)
ERYTHROCYTE [DISTWIDTH] IN BLOOD BY AUTOMATED COUNT: 15.5 % (ref 11.6–14.8)
GLOBULIN SER-MCNC: 4.6 G/DL
HCT VFR BLD CALC: 41.3 % (ref 37–47)
HCT VFR BLD CALC: 41.6 % (ref 37–47)
HGB BLD-MCNC: 13.1 G/DL (ref 12–16)
HGB BLD-MCNC: 13.1 G/DL (ref 12–16)
MCV RBC AUTO: 88 FL (ref 80–99)
MCV RBC AUTO: 88 FL (ref 80–99)
PLATELET # BLD: 571 K/UL (ref 150–450)
PLATELET # BLD: 629 K/UL (ref 150–450)
POTASSIUM SERPL-SCNC: 3.4 MMOL/L (ref 3.5–5.1)
POTASSIUM SERPL-SCNC: 3.5 MMOL/L (ref 3.5–5.1)
RBC # BLD AUTO: 4.69 M/UL (ref 4.2–5.4)
RBC # BLD AUTO: 4.74 M/UL (ref 4.2–5.4)
SODIUM SERPL-SCNC: 141 MMOL/L (ref 136–145)
SODIUM SERPL-SCNC: 143 MMOL/L (ref 136–145)
WBC # BLD AUTO: 21.5 K/UL (ref 4.8–10.8)
WBC # BLD AUTO: 21.9 K/UL (ref 4.8–10.8)

## 2019-07-28 RX ADMIN — ATORVASTATIN CALCIUM SCH MG: 80 TABLET, FILM COATED ORAL at 20:45

## 2019-07-28 RX ADMIN — INSULIN ASPART SCH UNITS: 100 INJECTION, SOLUTION INTRAVENOUS; SUBCUTANEOUS at 21:14

## 2019-07-28 RX ADMIN — INSULIN DETEMIR SCH UNITS: 100 INJECTION, SOLUTION SUBCUTANEOUS at 18:01

## 2019-07-28 RX ADMIN — INSULIN ASPART SCH UNITS: 100 INJECTION, SOLUTION INTRAVENOUS; SUBCUTANEOUS at 18:00

## 2019-07-28 RX ADMIN — SODIUM CHLORIDE SCH MLS/HR: 0.9 INJECTION INTRAVENOUS at 17:48

## 2019-07-28 RX ADMIN — METOPROLOL TARTRATE SCH MG: 50 TABLET, FILM COATED ORAL at 05:00

## 2019-07-28 RX ADMIN — INSULIN ASPART SCH UNITS: 100 INJECTION, SOLUTION INTRAVENOUS; SUBCUTANEOUS at 00:40

## 2019-07-28 RX ADMIN — APIXABAN SCH MG: 5 TABLET, FILM COATED ORAL at 17:48

## 2019-07-28 RX ADMIN — INSULIN ASPART SCH UNITS: 100 INJECTION, SOLUTION INTRAVENOUS; SUBCUTANEOUS at 05:01

## 2019-07-28 RX ADMIN — INSULIN ASPART SCH UNITS: 100 INJECTION, SOLUTION INTRAVENOUS; SUBCUTANEOUS at 13:12

## 2019-07-28 RX ADMIN — IPRATROPIUM BROMIDE AND ALBUTEROL SULFATE SCH ML: .5; 3 SOLUTION RESPIRATORY (INHALATION) at 01:21

## 2019-07-28 RX ADMIN — METHYLPREDNISOLONE SODIUM SUCCINATE SCH MG: 40 INJECTION, POWDER, LYOPHILIZED, FOR SOLUTION INTRAMUSCULAR; INTRAVENOUS at 09:25

## 2019-07-28 RX ADMIN — ASPIRIN 81 MG SCH MG: 81 TABLET ORAL at 09:26

## 2019-07-28 RX ADMIN — METOPROLOL TARTRATE SCH MG: 50 TABLET, FILM COATED ORAL at 17:48

## 2019-07-28 RX ADMIN — IPRATROPIUM BROMIDE AND ALBUTEROL SULFATE SCH ML: .5; 3 SOLUTION RESPIRATORY (INHALATION) at 19:21

## 2019-07-28 RX ADMIN — APIXABAN SCH MG: 5 TABLET, FILM COATED ORAL at 09:26

## 2019-07-28 RX ADMIN — IPRATROPIUM BROMIDE AND ALBUTEROL SULFATE SCH ML: .5; 3 SOLUTION RESPIRATORY (INHALATION) at 06:29

## 2019-07-28 RX ADMIN — HYDRALAZINE HYDROCHLORIDE SCH MG: 50 TABLET ORAL at 05:00

## 2019-07-28 RX ADMIN — INSULIN DETEMIR SCH UNITS: 100 INJECTION, SOLUTION SUBCUTANEOUS at 09:34

## 2019-07-28 RX ADMIN — PANTOPRAZOLE SODIUM SCH MG: 40 INJECTION, POWDER, FOR SOLUTION INTRAVENOUS at 09:25

## 2019-07-28 RX ADMIN — IPRATROPIUM BROMIDE AND ALBUTEROL SULFATE SCH ML: .5; 3 SOLUTION RESPIRATORY (INHALATION) at 13:39

## 2019-07-28 RX ADMIN — INSULIN ASPART SCH UNITS: 100 INJECTION, SOLUTION INTRAVENOUS; SUBCUTANEOUS at 09:33

## 2019-07-28 RX ADMIN — METOPROLOL TARTRATE SCH MG: 50 TABLET, FILM COATED ORAL at 23:57

## 2019-07-28 RX ADMIN — HYDRALAZINE HYDROCHLORIDE SCH MG: 50 TABLET ORAL at 21:48

## 2019-07-28 RX ADMIN — METOPROLOL TARTRATE SCH MG: 50 TABLET, FILM COATED ORAL at 13:07

## 2019-07-28 RX ADMIN — HYDRALAZINE HYDROCHLORIDE SCH MG: 50 TABLET ORAL at 14:36

## 2019-07-28 NOTE — GENERAL PROGRESS NOTE
Assessment/Plan


Problem List:  


(1) Bacteriuria


ICD Codes:  R82.71 - Bacteriuria


SNOMED:  19596355


(2) Uncontrolled diabetes mellitus


ICD Codes:  E11.65 - Type 2 diabetes mellitus with hyperglycemia


SNOMED:  66262658, 106014775


Qualifiers:  


   Qualified Codes:  E11.65 - Type 2 diabetes mellitus with hyperglycemia


(3) Acute exacerbation of CHF (congestive heart failure)


ICD Codes:  I50.9 - Heart failure, unspecified


SNOMED:  543700948, [id number]


Qualifiers:  


   Qualified Codes:  I50.9 - Heart failure, unspecified


Assessment/Plan:


continue Levemir to 16 units bid 


continue NISS every 4 hours





Subjective


ROS Limited/Unobtainable:  Yes


Allergies:  


Coded Allergies:  


     SULFA (SULFONAMIDE ANTIBIOTICS) (Unverified  Allergy, Unknown, 7/26/19)


Uncoded Allergies:  


     SULFA (Allergy, Unknown, 7/25/19)


Subjective


events noted 


intubated - awake 


on TF 


glucose in fair control 














Item Value  Date Time


 


Bedside Blood Glucose 141 mg/dl H 7/28/19 1312


 


Bedside Blood Glucose 130 mg/dl H 7/28/19 0934


 


Bedside Blood Glucose 141 mg/dl H 7/28/19 0501


 


Bedside Blood Glucose 135 mg/dl H 7/28/19 0040


 


Bedside Blood Glucose 190 mg/dl H 7/27/19 2107


 


Bedside Blood Glucose 193 mg/dl H 7/27/19 1841











Objective





Last 24 Hour Vital Signs








  Date Time  Temp Pulse Resp B/P (MAP) Pulse Ox O2 Delivery O2 Flow Rate FiO2


 


7/28/19 13:16  97 17     40


 


7/28/19 13:07  97  131/64    


 


7/28/19 11:25  90 14     40


 


7/28/19 10:00  84 13 146/79 (101) 99   


 


7/28/19 09:26  89  153/81    


 


7/28/19 09:01  84 14     40


 


7/28/19 09:00  87 20 153/81 (105) 100   


 


7/28/19 08:00  85      


 


7/28/19 08:00 98.6 86 18 141/68 (92) 100   


 


7/28/19 08:00        40


 


7/28/19 08:00      Mechanical Ventilator  


 


7/28/19 07:00  85 18 125/64 (84) 100   


 


7/28/19 06:40     100   


 


7/28/19 06:35  74 12  100 Mechanical Ventilator  40


 


7/28/19 06:30  83 14     40


 


7/28/19 06:25  84 14  100 Mechanical Ventilator  40


 


7/28/19 06:00  80 16 134/69 (90) 100   


 


7/28/19 05:08  88 12     40


 


7/28/19 05:00  89  125/62    


 


7/28/19 05:00    125/62    


 


7/28/19 05:00  93 16 150/62 (91) 100   


 


7/28/19 04:00  88      


 


7/28/19 04:00      Mechanical Ventilator  


 


7/28/19 04:00 99.0 87 16 122/60 (80) 100   


 


7/28/19 04:00        40


 


7/28/19 03:01  89 14     40


 


7/28/19 03:00  82 18 125/62 (83) 100   


 


7/28/19 02:00  81 12 99/57 (71) 99   


 


7/28/19 01:29  76 12  100 Mechanical Ventilator  40


 


7/28/19 01:22  75 12     40


 


7/28/19 01:21  83 12  100 Mechanical Ventilator  40


 


7/28/19 01:00  80 12 118/63 (81) 100   


 


7/28/19 00:00 98.3 78 20 119/61 (80) 100   


 


7/28/19 00:00        40


 


7/28/19 00:00  65      


 


7/28/19 00:00      Mechanical Ventilator  


 


7/27/19 23:32  91  113/59    


 


7/27/19 23:29  83 16     40


 


7/27/19 23:00  92 15 113/59 (77) 100   


 


7/27/19 22:00  90 16 104/54 (71) 100   


 


7/27/19 21:25  82 18     40


 


7/27/19 21:06    130/72    


 


7/27/19 21:00  83 17 130/72 (91) 100   


 


7/27/19 20:00        40


 


7/27/19 20:00 99.3 81 21 124/75 (91) 100   


 


7/27/19 20:00      Mechanical Ventilator  


 


7/27/19 20:00  82      


 


7/27/19 19:41  85 16  100 Mechanical Ventilator  40


 


7/27/19 19:33  81 16     40


 


7/27/19 19:31  84 16  100 Mechanical Ventilator  40


 


7/27/19 19:00  84 19 129/76 (93) 100   


 


7/27/19 18:40  113  129/58    


 


7/27/19 18:00  107 24 129/58 (81) 99   


 


7/27/19 17:00  110 19 130/58 (82) 99   


 


7/27/19 16:51  126 20     40


 


7/27/19 16:00  112      


 


7/27/19 16:00        40


 


7/27/19 16:00  110 20 130/68 (88) 99   


 


7/27/19 16:00 99.6 110 20 130/68 (88) 99   


 


7/27/19 16:00      Mechanical Ventilator  


 


7/27/19 15:00  110 20 125/59 (81) 99   


 


7/27/19 14:49  128 20     40


 


7/27/19 14:10    118/57    


 


7/27/19 14:00 99.1 106 20 118/57 (77) 99   


 


7/27/19 13:33 99.1       


 


7/27/19 13:21  122 20     40


 


7/27/19 13:21  122 20  100 Mechanical Ventilator  40


 


7/27/19 13:21        40

















Intake and Output  


 


 7/27/19 7/28/19





 18:59 06:59


 


Output Total 190 ml 190 ml


 


Balance -190 ml -190 ml


 


  


 


Output Urine Total 190 ml 190 ml








Laboratory Tests


7/28/19 05:20: 


White Blood Count 21.5#H, Red Blood Count 4.74, Hemoglobin 13.1, Hematocrit 41.6

, Mean Corpuscular Volume 88, Mean Corpuscular Hemoglobin 27.7, Mean 

Corpuscular Hemoglobin Concent 31.5L, Red Cell Distribution Width 15.2H, 

Platelet Count 571H, Mean Platelet Volume 5.6L, Neutrophils (%) (Auto) , 

Lymphocytes (%) (Auto) , Monocytes (%) (Auto) , Eosinophils (%) (Auto) , 

Basophils (%) (Auto) , Differential Total Cells Counted 100, Neutrophils % (

Manual) 86H, Lymphocytes % (Manual) 7L, Monocytes % (Manual) 7, Eosinophils % (

Manual) 0, Basophils % (Manual) 0, Band Neutrophils 0, Platelet Estimate 

IncreasedH, Platelet Morphology Normal, Polychromasia 1+, Hypochromasia 1+, 

Anisocytosis 1+, Sodium Level 143, Potassium Level 3.4L, Chloride Level 96L, 

Carbon Dioxide Level 35H, Anion Gap 12, Blood Urea Nitrogen 67H, Creatinine 2.9H

, Estimat Glomerular Filtration Rate , Glucose Level 161H, Calcium Level 9.7, 

Total Bilirubin 0.5, Aspartate Amino Transf (AST/SGOT) 22, Alanine 

Aminotransferase (ALT/SGPT) 13, Alkaline Phosphatase 127H, Troponin I 0.194H, 

Pro-B-Type Natriuretic Peptide 4990H, Total Protein 7.5, Albumin 2.9L, Globulin 

4.6, Albumin/Globulin Ratio 0.6L


7/28/19 08:00: 


White Blood Count 21.9H, Red Blood Count 4.69, Hemoglobin 13.1, Hematocrit 41.3

, Mean Corpuscular Volume 88, Mean Corpuscular Hemoglobin 27.9, Mean 

Corpuscular Hemoglobin Concent 31.6L, Red Cell Distribution Width 15.5H, 

Platelet Count 629H, Mean Platelet Volume 5.4L, Neutrophils (%) (Auto) , 

Lymphocytes (%) (Auto) , Monocytes (%) (Auto) , Eosinophils (%) (Auto) , 

Basophils (%) (Auto) , Differential Total Cells Counted 100, Neutrophils % (

Manual) 82H, Lymphocytes % (Manual) 8L, Monocytes % (Manual) 10, Eosinophils % (

Manual) 0, Basophils % (Manual) 0, Band Neutrophils 0, Platelet Estimate 

IncreasedH, Platelet Morphology Normal, Polychromasia 1+, Hypochromasia 1+, 

Anisocytosis 1+, Sodium Level 141, Potassium Level 3.5, Chloride Level 101, 

Carbon Dioxide Level 38H, Anion Gap 2L, Blood Urea Nitrogen 70H, Creatinine 2.8H

, Estimat Glomerular Filtration Rate , Glucose Level 151H, Calcium Level 9.3, 

Troponin I 0.139H, Magnesium Level 2.6H


Height (Feet):  5


Height (Inches):  4.00


Weight (Pounds):  170


General Appearance:  no apparent distress, other - intubated


EENT:  other - ETT


Neck:  normal alignment


Cardiovascular:  normal rate


Respiratory/Chest:  decreased breath sounds


Abdomen:  normal bowel sounds


Edema:  1+ Arm (L), 1+ Arm (R), 1+ Leg (L), 1+ Leg (R), 1+ Pedal (L), 1+ Pedal (

R), 1+ Generalized


Objective





Current Medications








 Medications


  (Trade)  Dose


 Ordered  Sig/Michele


 Route


 PRN Reason  Start Time


 Stop Time Status Last Admin


Dose Admin


 


 Acetaminophen


  (Tylenol)  650 mg  Q6H  PRN


 NG


 Mild Pain/Temp > 100.5  7/27/19 12:45


 8/26/19 12:44  7/27/19 13:03


 


 


 Albuterol/


 Ipratropium


  (Albuterol/


 Ipratropium)  3 ml  Q6HRT


 HHN


   7/26/19 13:00


 7/31/19 08:59  7/28/19 06:29


 


 


 Amlodipine


 Besylate


  (Norvasc)  5 mg  DAILY


 ORAL


   7/27/19 09:00


 8/26/19 08:59  7/28/19 09:26


 


 


 Apixaban


  (Eliquis)  5 mg  BID


 ORAL


   7/26/19 18:00


 8/25/19 08:59  7/28/19 09:26


 


 


 Aspirin


  (ASA)  81 mg  DAILY


 ORAL


   7/27/19 09:00


 8/25/19 08:59  7/28/19 09:26


 


 


 Atorvastatin


 Calcium


  (Lipitor)  80 mg  BEDTIME


 ORAL


   7/26/19 21:00


 8/25/19 20:59  7/27/19 21:06


 


 


 Clopidogrel


 Bisulfate


  (Plavix)  75 mg  DAILY


 ORAL


   7/27/19 09:00


 8/25/19 08:59  7/28/19 09:26


 


 


 Hydralazine HCl


  (Apresoline)  50 mg  EVERY 8  HOURS


 ORAL


   7/26/19 14:00


 8/25/19 05:59  7/28/19 05:00


 


 


 Insulin Aspart


  (NovoLOG)    EVERY 4  HOURS


 SUBQ


   7/26/19 21:00


 8/25/19 06:29  7/28/19 13:12


 


 


 Insulin Detemir


  (Levemir)  16 units  BID


 SUBQ


   7/27/19 09:00


 8/25/19 20:59  7/28/19 09:34


 


 


 Lorazepam


  (Ativan 2mg/ml


 1ml)  1 mg  Q2H  PRN


 IV


 For Anxiety  7/26/19 17:15


 8/2/19 17:14  7/26/19 17:42


 


 


 Methylprednisolone


 Sodium Succinate


  (Solu-MEDROL)  40 mg  DAILY


 IVP


   7/28/19 09:00


 8/27/19 08:59  7/28/19 09:25


 


 


 Metoprolol


 Tartrate


  (Lopressor)  50 mg  Q6HR


 ORAL


   7/27/19 18:30


 8/26/19 18:29  7/28/19 13:07


 


 


 Pantoprazole


  (Protonix)  40 mg  DAILY


 IVP


   7/26/19 20:45


 8/25/19 20:44  7/28/19 09:25


 

















Paolo Mendoza MD Jul 28, 2019 13:20

## 2019-07-28 NOTE — PROGRESS NOTE
DATE:  07/28/2019

CARDIOLOGY PROGRESS NOTE



SUBJECTIVE:  The patient remains in the intensive care unit.  Condition

remains critical.  Prognosis guarded.  She remains on ventilator support.

Weaning trials are in progress.  Monitored rhythm, sinus with

bundle-branch block.



PHYSICAL EXAMINATION:

VITAL SIGNS:  Blood pressure 109/54, pulse 80, respiratory rate 14, and

afebrile.

LUNGS:  Bilateral breath sounds.  Scattered rhonchi.  Orally intubated.

HEART:  Regular rhythm and rate.  Normal S1 and S2.  A 1/6 systolic murmur

at apex.

ABDOMEN:  Soft.  There is no guarding or rebound.

EXTREMITIES:  Without edema.



LABORATORY AND DIAGNOSTIC DATA:  White count 21.9 and hemoglobin 13.1.

Chest x-ray today reveals no interval change with atelectasis at the left

base.  ABG, pH 7.49, pCO2 49, and pO2 116.  Sodium 141, potassium 3.5,

bicarbonate 38, BUN 70, and creatinine 2.8.  Troponin is 0.139.

Natriuretic peptide is 4990.



IMPRESSION:

1. Respiratory failure.

2. UTI with sepsis.

3. COPD exacerbation.

4. Acute on chronic renal failure.

5. Cerebrovascular disease with encephalopathy.

6. Acute on chronic diastolic congestive heart failure.

7. Paroxysmal atrial fibrillation, now in sinus rhythm.

8. Permanent pacemaker.



PLAN:

1. Weaning trials.

2. Antimicrobials.

3. Respiratory hygiene.

4. Steroid taper.

5. Hold diuretics.

6. Repeat renal parameters.

7. DVT and stress ulcer prophylaxis.









  ______________________________________________

  Keon Coker M.D.





DR:  VALDEZ

D:  07/28/2019 22:11

T:  07/28/2019 22:18

JOB#:  [id number]

CC:

## 2019-07-28 NOTE — NUR
NURSE NOTES:

Dietitian called with a recommendation for tube feeding. Recommendation is for Glucerna 1.5 
at 40mL/hr. Will change order at this time.

## 2019-07-28 NOTE — NUR
NURSE NOTES:

Patient repositioned and given oral care, HR in afib controlled. Patient sleeping at this 
time. BP is stable. Pacer capturing. Afebrile. Patient awake upon verbal stimulus.

## 2019-07-28 NOTE — PULMONOLGY CRITICAL CARE NOTE
Critical Care - Asmt/Plan


Assessment/Plan:


1. Congestive heart failure.


2. Diabetes, out of control.


3. Altered mental status with evidence of old cerebral infarct.


4. COPD.


5. Sleep apnea.


6. Atrial fibrillation, not on anticoagulants.


7. Coronary heart disease.


8. Aortic atherosclerosis.


9. UTI





PLAN:  


weaning protocol to continue extubate if stable in am


abx per ID


nebs


rate control


wound care


avoid narcotics


abg and labs in am


troponins trending down


Respiratory:  weaning trial


Cardiac:  continue to monitor HR/BP


Gastrointestinal:  continue feedings/current rate


Endocrine:  monitor blood sugar


Neurologic:  PRN Ativan


Prophylaxis:  Protonix


Disposition:  keep in ICU


Time Spent (Minutes):  40


Notes Reviewed:  internist, cardio


Discussed with:  nurses





Critical Care - Objective





Last 24 Hour Vital Signs








  Date Time  Temp Pulse Resp B/P (MAP) Pulse Ox O2 Delivery O2 Flow Rate FiO2


 


7/28/19 19:32  77 12  100 Mechanical Ventilator  40


 


7/28/19 19:22  82 15     40


 


7/28/19 19:21  81 14  100 Mechanical Ventilator  40


 


7/28/19 19:00  80 14 109/54 (72) 100   


 


7/28/19 18:00  92 20 133/66 (88) 99   


 


7/28/19 17:48  88  121/57    


 


7/28/19 17:25  90 15     40


 


7/28/19 17:00  92 18 121/57 (78) 100   


 


7/28/19 16:00      Mechanical Ventilator  


 


7/28/19 16:00        40


 


7/28/19 16:00 99.1 94 15 125/74 (91) 100   


 


7/28/19 16:00  88      


 


7/28/19 15:29  85 14     40


 


7/28/19 15:00  93 17 115/59 (77) 99   


 


7/28/19 14:36    114/63    


 


7/28/19 14:00 98.8 88 14 114/63 (80) 99   


 


7/28/19 13:16  97 17     40


 


7/28/19 13:15  88 14  100 Mechanical Ventilator  40


 


7/28/19 13:07  97  131/64    


 


7/28/19 13:00  90 12  100 Mechanical Ventilator  40


 


7/28/19 13:00  93 18 131/64 (86) 99   


 


7/28/19 12:00      Mechanical Ventilator  


 


7/28/19 12:00        40


 


7/28/19 12:00  94 16 126/61 (82) 99   


 


7/28/19 12:00  93      


 


7/28/19 11:25  90 14     40


 


7/28/19 11:00  92 17 140/71 (94) 99   


 


7/28/19 10:00  84 13 146/79 (101) 99   


 


7/28/19 09:26  89  153/81    


 


7/28/19 09:01  84 14     40


 


7/28/19 09:00  87 20 153/81 (105) 100   


 


7/28/19 08:00  85      


 


7/28/19 08:00 98.6 86 18 141/68 (92) 100   


 


7/28/19 08:00        40


 


7/28/19 08:00      Mechanical Ventilator  


 


7/28/19 07:00  85 18 125/64 (84) 100   


 


7/28/19 06:40     100   


 


7/28/19 06:35  74 12  100 Mechanical Ventilator  40


 


7/28/19 06:30  83 14     40


 


7/28/19 06:25  84 14  100 Mechanical Ventilator  40


 


7/28/19 06:00  80 16 134/69 (90) 100   


 


7/28/19 05:08  88 12     40


 


7/28/19 05:00  89  125/62    


 


7/28/19 05:00    125/62    


 


7/28/19 05:00  93 16 150/62 (91) 100   


 


7/28/19 04:00  88      


 


7/28/19 04:00      Mechanical Ventilator  


 


7/28/19 04:00 99.0 87 16 122/60 (80) 100   


 


7/28/19 04:00        40


 


7/28/19 03:01  89 14     40


 


7/28/19 03:00  82 18 125/62 (83) 100   


 


7/28/19 02:00  81 12 99/57 (71) 99   


 


7/28/19 01:29  76 12  100 Mechanical Ventilator  40


 


7/28/19 01:22  75 12     40


 


7/28/19 01:21  83 12  100 Mechanical Ventilator  40


 


7/28/19 01:00  80 12 118/63 (81) 100   


 


7/28/19 00:00 98.3 78 20 119/61 (80) 100   


 


7/28/19 00:00        40


 


7/28/19 00:00  65      


 


7/28/19 00:00      Mechanical Ventilator  


 


7/27/19 23:32  91  113/59    


 


7/27/19 23:29  83 16     40


 


7/27/19 23:00  92 15 113/59 (77) 100   


 


7/27/19 22:00  90 16 104/54 (71) 100   


 


7/27/19 21:25  82 18     40


 


7/27/19 21:06    130/72    


 


7/27/19 21:00  83 17 130/72 (91) 100   








Status:  awake


Lungs:  rhonchi


Heart:  HR/BP stable


Abdomen:  soft, non-tender


Extremities:  edema


Micro:





Microbiology








 Date/Time


Source Procedure


Growth Status


 


 


 7/26/19 16:20


Sputum Gram Stain - Final Complete


 


 7/26/19 16:20


Sputum Sputum Culture - Final


NORMAL UPPER RESPIRATORY PEYTON AT 48 ... Complete


 


 7/26/19 04:30


Nose MRSA Culture - Final


NO METHICILLIN RESISTANT STAPH AUREUS... Complete


 


 7/25/19 23:30


Urine,Clean Catch Urine Culture - Final


Klebsiella Pneumoniae Complete


 


 7/26/19 04:30


Rectum - Final


NO CARBAPENEM-RESISTANT ENTEROBACTERI... Complete


 


 7/26/19 04:30


Rectum VRE Culture - Final


NO VANCOMYCIN RESISTANT ENTEROCOCCUS ... Complete








Accucheck:  181


Blood Sugars:  BS not controlled





Critical Care - Subjective


ROS Limited/Unobtainable:  Yes


Condition:  critical, improving


FI02:  40


Vent Support Breath Rate:  12


Vent Support Mode:  IMV/SIMV


Vent Tidal Volume:  400


Sputum Amount:  Scant


PEEP:  5.0


PIP:  21


Tube Feeding Amount:  20


I&O:











Intake and Output  


 


 7/27/19 7/28/19





 19:00 07:00


 


Output Total 170 ml 210 ml


 


Balance -170 ml -210 ml


 


  


 


Output Urine Total 170 ml 210 ml








Subjective:


more awake


doing well on current imv settings


weaned on CPAP for 6 hours


doign better


WBC up today


seen by ID, started on abx


positive uop


no cp nv or bleeding


tolerating tf


ET-Tube:  7.5


ET Position:  20


Labs:





Laboratory Tests








Test


  7/28/19


05:20 7/28/19


08:00 7/28/19


13:21


 


White Blood Count


  21.5 K/UL


(4.8-10.8)  #H 21.9 K/UL


(4.8-10.8)  H 


 


 


Red Blood Count


  4.74 M/UL


(4.20-5.40) 4.69 M/UL


(4.20-5.40) 


 


 


Hemoglobin


  13.1 G/DL


(12.0-16.0) 13.1 G/DL


(12.0-16.0) 


 


 


Hematocrit


  41.6 %


(37.0-47.0) 41.3 %


(37.0-47.0) 


 


 


Mean Corpuscular Volume 88 FL (80-99)   88 FL (80-99)   


 


Mean Corpuscular Hemoglobin


  27.7 PG


(27.0-31.0) 27.9 PG


(27.0-31.0) 


 


 


Mean Corpuscular Hemoglobin


Concent 31.5 G/DL


(32.0-36.0)  L 31.6 G/DL


(32.0-36.0)  L 


 


 


Red Cell Distribution Width


  15.2 %


(11.6-14.8)  H 15.5 %


(11.6-14.8)  H 


 


 


Platelet Count


  571 K/UL


(150-450)  H 629 K/UL


(150-450)  H 


 


 


Mean Platelet Volume


  5.6 FL


(6.5-10.1)  L 5.4 FL


(6.5-10.1)  L 


 


 


Neutrophils (%) (Auto)


  % (45.0-75.0)


  % (45.0-75.0)


  


 


 


Lymphocytes (%) (Auto)


  % (20.0-45.0)


  % (20.0-45.0)


  


 


 


Monocytes (%) (Auto)  % (1.0-10.0)    % (1.0-10.0)   


 


Eosinophils (%) (Auto)  % (0.0-3.0)    % (0.0-3.0)   


 


Basophils (%) (Auto)  % (0.0-2.0)    % (0.0-2.0)   


 


Differential Total Cells


Counted 100  


  100  


  


 


 


Neutrophils % (Manual) 86 % (45-75)  H 82 % (45-75)  H 


 


Lymphocytes % (Manual) 7 % (20-45)  L 8 % (20-45)  L 


 


Monocytes % (Manual) 7 % (1-10)   10 % (1-10)   


 


Eosinophils % (Manual) 0 % (0-3)   0 % (0-3)   


 


Basophils % (Manual) 0 % (0-2)   0 % (0-2)   


 


Band Neutrophils 0 % (0-8)   0 % (0-8)   


 


Platelet Estimate Increased  H Increased  H 


 


Platelet Morphology Normal   Normal   


 


Polychromasia 1+   1+   


 


Hypochromasia 1+   1+   


 


Anisocytosis 1+   1+   


 


Sodium Level


  143 MMOL/L


(136-145) 141 MMOL/L


(136-145) 


 


 


Potassium Level


  3.4 MMOL/L


(3.5-5.1)  L 3.5 MMOL/L


(3.5-5.1) 


 


 


Chloride Level


  96 MMOL/L


()  L 101 MMOL/L


() 


 


 


Carbon Dioxide Level


  35 MMOL/L


(21-32)  H 38 MMOL/L


(21-32)  H 


 


 


Anion Gap


  12 mmol/L


(5-15) 2 mmol/L


(5-15)  L 


 


 


Blood Urea Nitrogen


  67 mg/dL


(7-18)  H 70 mg/dL


(7-18)  H 


 


 


Creatinine


  2.9 MG/DL


(0.55-1.30)  H 2.8 MG/DL


(0.55-1.30)  H 


 


 


Estimat Glomerular Filtration


Rate  mL/min (>60)  


   mL/min (>60)  


  


 


 


Glucose Level


  161 MG/DL


()  H 151 MG/DL


()  H 


 


 


Calcium Level


  9.7 MG/DL


(8.5-10.1) 9.3 MG/DL


(8.5-10.1) 


 


 


Total Bilirubin


  0.5 MG/DL


(0.2-1.0) 


  


 


 


Aspartate Amino Transf


(AST/SGOT) 22 U/L (15-37)


  


  


 


 


Alanine Aminotransferase


(ALT/SGPT) 13 U/L (12-78)


  


  


 


 


Alkaline Phosphatase


  127 U/L


()  H 


  


 


 


Troponin I


  0.194 ng/mL


(0.000-0.056) 0.139 ng/mL


(0.000-0.056) 


 


 


Pro-B-Type Natriuretic Peptide


  4990 pg/mL


(0-125)  H 


  


 


 


Total Protein


  7.5 G/DL


(6.4-8.2) 


  


 


 


Albumin


  2.9 G/DL


(3.4-5.0)  L 


  


 


 


Globulin 4.6 g/dL    


 


Albumin/Globulin Ratio


  0.6 (1.0-2.7)


L 


  


 


 


Magnesium Level


  


  2.6 MG/DL


(1.8-2.4)  H 


 


 


Arterial Blood pH


  


  


  7.494


(7.350-7.450)


 


Arterial Blood Partial


Pressure CO2 


  


  49.6 mmHg


(35.0-45.0)  H


 


Arterial Blood Partial


Pressure O2 


  


  116.5 mmHg


(75.0-100.0)  H


 


Arterial Blood HCO3


  


  


  37.3 mmol/L


(22.0-26.0)  H


 


Arterial Blood Oxygen


Saturation 


  


  98.0 %


()


 


Arterial Blood Base Excess   12.3 (-2-2)  *H


 


Jean Pierre Test   Positive  








Current Medications








 Medications


  (Trade)  Dose


 Ordered  Sig/Michele


 Route


 PRN Reason  Start Time


 Stop Time Status Last Admin


Dose Admin


 


 Acetaminophen


  (Tylenol)  650 mg  Q6H  PRN


 NG


 Mild Pain/Temp > 100.5  7/27/19 12:45


 8/26/19 12:44  7/27/19 13:03


 


 


 Albuterol/


 Ipratropium


  (Albuterol/


 Ipratropium)  3 ml  Q6HRT


 HHN


   7/26/19 13:00


 7/31/19 08:59  7/28/19 19:21


 


 


 Amlodipine


 Besylate


  (Norvasc)  5 mg  DAILY


 ORAL


   7/27/19 09:00


 8/26/19 08:59  7/28/19 09:26


 


 


 Apixaban


  (Eliquis)  5 mg  BID


 ORAL


   7/26/19 18:00


 8/25/19 08:59  7/28/19 17:48


 


 


 Aspirin


  (ASA)  81 mg  DAILY


 ORAL


   7/27/19 09:00


 8/25/19 08:59  7/28/19 09:26


 


 


 Atorvastatin


 Calcium


  (Lipitor)  80 mg  BEDTIME


 ORAL


   7/26/19 21:00


 8/25/19 20:59  7/27/19 21:06


 


 


 Ceftriaxone


 Sodium 1 gm/


 Dextrose  50 ml @ 


 100 mls/hr  Q24H


 IVPB


   7/28/19 17:00


 8/4/19 16:59  7/28/19 17:48


 


 


 Clopidogrel


 Bisulfate


  (Plavix)  75 mg  DAILY


 ORAL


   7/27/19 09:00


 8/25/19 08:59  7/28/19 09:26


 


 


 Hydralazine HCl


  (Apresoline)  50 mg  EVERY 8  HOURS


 ORAL


   7/26/19 14:00


 8/25/19 05:59  7/28/19 14:36


 


 


 Insulin Aspart


  (NovoLOG)    EVERY 4  HOURS


 SUBQ


   7/26/19 21:00


 8/25/19 06:29  7/28/19 18:00


 


 


 Insulin Detemir


  (Levemir)  16 units  BID


 SUBQ


   7/27/19 09:00


 8/25/19 20:59  7/28/19 18:01


 


 


 Lorazepam


  (Ativan 2mg/ml


 1ml)  1 mg  Q2H  PRN


 IV


 For Anxiety  7/26/19 17:15


 8/2/19 17:14  7/26/19 17:42


 


 


 Methylprednisolone


 Sodium Succinate


  (Solu-MEDROL)  40 mg  DAILY


 IVP


   7/28/19 09:00


 8/27/19 08:59  7/28/19 09:25


 


 


 Metoprolol


 Tartrate


  (Lopressor)  50 mg  Q6HR


 ORAL


   7/27/19 18:30


 8/26/19 18:29  7/28/19 17:48


 


 


 Pantoprazole


  (Protonix)  40 mg  DAILY


 IVP


   7/26/19 20:45


 8/25/19 20:44  7/28/19 09:25


 

















Magdalena Main DO Jul 28, 2019 20:12

## 2019-07-28 NOTE — NUR
NURSE NOTES:

No acute changes overnight. Patient remains alert and oriented. VSS. Afebrile. Repositioned 
patient, NGT flushed.

## 2019-07-28 NOTE — NUR
NURSE NOTES:

Received patient from [facility] [place]. Patient reported to be alert and oriented times 4, able to 
follow commands, and primarily Croatian speaking. Patient sleeping at this time. Heart rate 
80 with some PVCs, blood pressure 125/64, SpO2 100%, RR 19. Capnography showing 45mmHg CO2 
on the monitor. Patient's eyes PERRLA bilaterally. Patient withdraws to pain. Patient's feet 
4/5 strength. right arm 4/5 strength and left arm 3/5 strength. Patient has endotracheal 
tube with 20cm at the lip line. Patient on CPAP . Patient tolerating setting with oxygen 
saturation 99%. Patient has a right nares NGT that is patent and verified with aspiration 
and auscultation at this time. Patient remains NPO awaiting nutritional consult. Will notify 
Dr Main. Patient skin intact at this time. Patient has a right antecubital 20 gauge 
peripheral IV and a left antecubital 18 gauge IV. Both are patent, asymptomatic, and saline 
locked at this time. skin intact. Patient has high WBC 21.5 this morning and  low potassium 
3.4 this morning. Will notify Dr Main. Patient has 0.194 serum troponin value this 
morning. This is elevated from 0.147 yesterday. Patient showing abnormal rhythm on the 
monitor and the pacer is not capturing. Will perform EKG and notify MD. Patient showing no 
sign of acute distress. Bed in low position with bed alarm on and call light in reach at 
this time.

## 2019-07-28 NOTE — NUR
NURSE NOTES:

Respiratory therapist Robert unable to obtain ABG due presumably to dehydration. Left message 
on Dr Main's emergency line regarding inability to obtain ABG, WBC count of 21.5 and 
serum potassium of 3.4. Recommended IV fluid therapy for this patient. Awaiting call back 
and orders.

## 2019-07-28 NOTE — DIAGNOSTIC IMAGING REPORT
EXAM:

  XR Chest, 1 View

 

CLINICAL HISTORY:

  CONSTIPATION

 

TECHNIQUE:

  Frontal view of the chest.

 

COMPARISON:

  Chest x-ray dated 7/26/19

 

FINDINGS:

  Lungs:  Subsegmental atelectasis in the left lung base, unchanged.

  Pleural space:  Unremarkable.  The costophrenic angles are sharp.  No 

visible pneumothorax.

  Heart:  Cardiomegaly, unchanged.

  Mediastinum:  Unremarkable.

  Bones/joints:  Unremarkable.

  Tubes, lines and devices:  Stable positioning of endotracheal tube.  NG 

tube extends below the diaphragm and its tip is not visualized.  Cardiac 

pacer in the left chest wall with the lead tips in the right atrium and 

right ventricle regions.  Telemetry leads overlie the thorax.

 

IMPRESSION:     

  No significant interval change from the prior chest x-ray.

## 2019-07-28 NOTE — NUR
HAND-OFF: 

Report given to GEN [facility]. Patient blood pressure 109/54, HR 86, SpO2 99%, and RR 16. 
Patient showing no sign of acute distress.

## 2019-07-28 NOTE — NUR
RESPIRATORY NOTE: Endorsed to RN Sheryl that I was unsuccessful in drawing the AM ABG from 
pt. Rn suggest pt may be dehydrated & states she will discuss with MD for solution. Will 
continue to monitor.

## 2019-07-28 NOTE — NUR
NURSE NOTES:

Blood pressure 114/63, HR 89 in atrial fibrillation with atrial pacing, SpO2 99%, RR 13. 
Patient showing no sign of acute distress. Repositioning done at this time.

## 2019-07-28 NOTE — NUR
NURSE NOTES:

Dr Main ordered chest x-ray, blood culture, urine culture, sputum culture, tube feeding 
of Glucerna 1.2 at 60mL/hr, and nutritional consult using TO/RB. Orders placed at this time.

## 2019-07-28 NOTE — PROGRESS NOTE
DATE:  07/27/2019

CARDIOLOGY PROGRESS NOTE



SUBJECTIVE:  The patient's condition remains critical.  Prognosis guarded.

She remains in the intensive care unit.



The patient has monitored rhythm, atrial fibrillation.



OBJECTIVE:

VITAL SIGNS:  Blood pressure 130/72, pulse 81, respiratory rate 21,

temperature 99.6 max.

LUNGS:  Bilateral breath sounds.  Scattered rhonchi and rales.

HEART:  Irregularly irregular rhythm.  Normal S1, S2.  Murmur unchanged.

ABDOMEN:  Soft.  1+ dependent edema.



LABORATORY DATA:  White count 13, hemoglobin 12.3.  Sodium 144, potassium

3.6, bicarb 35, BUN 42, creatinine 2.0, glucose 246.  Troponin increased

to 0.147.  Albumin 2.9.



IMPRESSION:

1. Acute on chronic systolic and diastolic congestive heart failure.

2. Acute renal failure post diuresis.

3. Acute myocardial ischemia and possible non-ST elevation infarction.

4. Moderate protein-calorie malnutrition.

5. Respiratory failure.

6. Chronic obstructive pulmonary disease.

7. Respiratory alkalosis.



PLAN:

1. Adjust vent.

2. Hold diuretics.

3. Steroid taper.

4. Bronchodilators.

5. Antimicrobials.

6. Trend troponin level.

7. Recheck chest x-ray.

8. Beta-blocker therapy for rate control advanced and discussed with ICU

staff.









  ______________________________________________

  Keon Coker M.D.





DR:  MARINE

D:  07/28/2019 00:51

T:  07/28/2019 01:55

JOB#:  [id number]

CC:

## 2019-07-28 NOTE — NUR
NURSE NOTES:

Repositioned patient and given oral care.  Vitals remains stable, Afib, NAD. Patient 
sleeping at this time.

## 2019-07-28 NOTE — NUR
NURSE NOTES:Turned for comfort and suctioned tk beige secretions moderate in amt. Dr Main 
at bedside updated with pts condition. No new orders given,

## 2019-07-28 NOTE — NUR
NURSE NOTES:

Blood pressure 133/66, HR 92 in atrial fibrillation with atrial pacing, SpO2 100%, RR 18. 
Patient showing no sign of acute distress. Repositioning done at this time. 

-------------------------------------------------------------------------------

Addendum: 07/28/19 at 2001 by Sheryl Black RN

-------------------------------------------------------------------------------

Patient cleaned and repositioned at this time.

## 2019-07-28 NOTE — NUR
NURSE NOTES:

Patient reported to be alert and oriented times 4, able to follow commands. Patient awake 
and oriented. Family at the bedside speaking with patient. Heart rate 93 in atrial 
fibrillation with A-pacing, blood pressure 125/74, SpO2 100%, RR 16. Capnography showing 
44mmHg CO2 on the monitor. Patient's eyes PERRLA bilaterally. Patient's feet 5/5 strength. 
right arm 5/5 strength and left arm 4/5 strength. Patient has endotracheal tube with 20cm at 
the lip line. Patient back on SIMV 12, tidal volume 400, FiO2 40%, and PEEP 5. Weaning trial 
successful. ABG obtained. Dr Main reported that patient would not be extubated today. 
Patient tolerating setting with oxygen saturation 99%. Patient has a right nares NGT that is 
patent and verified with aspiration and auscultation at this time. Patient running tube 
feeding of Glucerna 1.5 at 20mL/hr with goal of 40mL/hr. Patient reported nausea when 
feeding increased after four hours on 20mL/hr. Will continue to monitor and increase feeding 
when possible. No residual noted. Patient skin intact. Patient has a right antecubital 20 
gauge peripheral IV and a left antecubital 18 gauge IV. Both are patent, asymptomatic, and 
saline locked at this time. Patient showing no sign of acute distress. Bed in low position 
with bed alarm on and call light in reach at this time.

-------------------------------------------------------------------------------

Addendum: 07/28/19 at 2001 by Sheryl Black RN

-------------------------------------------------------------------------------

Oral care and repositioning performed.

## 2019-07-28 NOTE — NUR
CASE MANAGEMENT: REVIEW 



7/28/2019



SI: CHF. UNCONTROLLED DM

T 99.1 HR 94 RR 15 B/P 125/74 SATS 100% ON MECH VENT FIO2 40 

WBC 21.9 CO2 38 BUN 70 CR 2.8  MG 2.6 TROPONIN 0.139

ABGs PH 7.494 PCO2 49.6 PO2 116.5 HCO3 37.3 BE 12.3 



IS: INSULIN ASPART SUBQ Q4H 

HYDRALAZINE PO Q8H 

SOLU MEDROL IV Q8H 

LASIX IV Q8H 

LIPITOR PO QHS 

ELIQUIS PO BID 

LEVEMIR SUBQ BID 

ASA PO QD

PLAVIX PO QD

LISINOPRIL PO QD

PROTONIX IV QD

NORVASC PO QD



**: ICU STATUS

## 2019-07-28 NOTE — CONSULTATION
DATE OF CONSULTATION:  07/28/2019

INFECTIOUS DISEASE CONSULT



CONSULTING PHYSICIAN:  Eldon Gould M.D.



ATTENDING PHYSICIAN:  Yakov Alva M.D.



REFERRING PHYSICIAN:  Magdalena Main D.O. who is covering for Yakov Alva M.D.



REASON FOR CONSULTATION:  Possible sepsis, elevated white count, fevers,

Klebsiella UTI.



CHIEF COMPLAINT:  The patient's chief complaint coming into the hospital is

congestive heart failure and respiratory failure.



HISTORY OF PRESENT ILLNESS:  This is a 74-year-old female who comes in to

[facility] because of shortness of breath and uncontrolled diabetes.

She had an elevated blood sugar of 437, had CHF, and frequent falls.  The

patient is now intubated in the ICU at [facility].  She is alert.  She is not

on pressors.  The patient's leukocytosis is getting worse with white count

of 21.9.  Workup shows that she has a Klebsiella pneumoniae UTI; however,

she is on IV steroids also.  Infectious Disease consult requested for

antibiotic management.  The patient was placed on Rocephin 1 g IV q.24

hours.  Chart was reviewed.  Records and labs are reviewed.  Cultures

reviewed.



REVIEW OF SYSTEMS:  CONSTITUTIONAL:  She is in the ICU.  She is intubated

on a vent.  No pressors.  HEAD AND NECK:  She is orally intubated.

CARDIAC:  No pressors.  GASTROINTESTINAL:  No nausea, vomiting, or

diarrhea.  GENITOURINARY:  She has a Renee.  PULMONARY:  She came in with

shortness of breath, is intubated.  SKIN:  No rash.  No pruritus, nausea,

or seizures.



PAST MEDICAL HISTORY:  Includes the following.  The patient has a past

medical history of CHF.  She has an elevated creatinine.  She has history

of diabetes, history of falls, sick sinus syndrome, pacemaker, COPD, sleep

apnea, anemia, aortic atherosclerosis, coronary artery disease, back pain,

diastolic heart failure, chronic kidney disease, hyperlipidemia,

neuropathy, obesity, hypertension.  She has history of retinopathy,

vitamin D deficiency.  She has been on blood pressure medications.



ALLERGIES:  Include sulfa drugs.



SOCIAL HISTORY:  Negative for smoking, alcohol, drug abuse.



FAMILY HISTORY:  Noncontributory.



MEDICATIONS:  Upon reviewing the MAR, she is on following medications.  She

is on methylprednisolone, metoprolol.  She is on acetaminophen, aspirin,

clopidogrel, amlodipine, insulin.  She is on Lipitor, NovoLog insulin,

Protonix, Eliquis, lorazepam, hydralazine, albuterol treatments.  Outside

medications noted and reconciliated.



PHYSICAL EXAMINATION:

VITAL SIGNS:  T-max is 100.6, currently blood pressure 114/63, most recent

temperature 98.6, pulse rate 97, respiratory rate 17, FiO2 40%, saturation

100%.

GENERAL:  She is in the ICU.  She is intubated.  No pressors.

HEAD AND NECK:  Orally intubated.  Eye exam, no icterus.  Normocephalic.

No JVD.

HEART:  Regular.  No obvious gallop.  No murmur, friction rub.

ABDOMEN:  Soft.  Positive bowel sounds.

LUNGS:  Bilateral rhonchi, rales, and crackles.  Possible rales, but no

significant rales more fairly.  Clearly we might say maybe occasional

crackles and rhonchi.

SKIN:  No rash or dermatitis.

MUSCULOSKELETAL:  No effusion.  Legs are without cellulitis.

PERIPHERAL VASCULAR:  No cyanosis.

GENITOURINARY:  She has a Renee.  Urine is slightly cloudy.

LINE SITES:  Without phlebitis.

NEUROLOGIC:  Awake and responsive.



LABORATORY DATA:  As follows.  White count 21.9, hemoglobin 13.1.

Creatinine 2.8.  UA had positive nitrite, moderate bacteria.  Urine

culture greater than 100,000 Klebsiella pneumoniae sensitive to Rocephin

and cefazolin.  Sputum culture normal jeana.  Chest x-ray showed no

significant change and it looks like atelectasis was seen.  It is the most

recent chest x-ray from today 07/20/2019.  Initial chest x-ray showed CHF

findings, interstitial congestion.



ASSESSMENT AND PLAN:

1. The patient has Klebsiella UTI, likely complicated UTI.  The patient

has elevated white count and fevers.  However, she has also been on

steroids, which could cause leukocytosis.  The patient will be placed on

Rocephin 1 g IV q.24 hours for the Klebsiella UTI and possible sepsis,

elevated white count.  Monitor leukocytosis.  Again, the patient is on

steroids.  There is no obvious pneumonia at this time.  Most likely CHF

with respiratory failure.  Continue Rocephin ________.  Plan on 7 to

10-day course of antibiotics.  Continue IV Rocephin for now.

2. Acute renal failure.

3. Anemia.

4. CHF.

5. Respiratory failure.

6. ICU care.

7. Diabetes.

8. Possible hypertension.

9. Blood sugar treatment per Endocrinology and primary.

10. Vent care per Dr. Alva and Dr. Main.

11. Falls.

12. Sick sinus syndrome, pacemaker.

13. Sleep apnea, COPD.

14. Anemia.

15. Diastolic heart disease.

16. Coronary artery disease.

17. CHF.

18. Dyslipidemia.

19. Neuropathy.

20. Obesity.

21. Vitamin D deficiency.

22. Allergies to sulfa drugs.

23. MAR is noted.

24. Case was discussed with RN.

25. Social history negative.

26. Family history noncontributory.

27. Continue treatment per primary consultants.









  ______________________________________________

  Eldon Gould M.D.





DR:  GURINDER

D:  07/28/2019 15:38

T:  07/28/2019 21:43

JOB#:  [id number]

CC:

## 2019-07-28 NOTE — INFECTIOUS DISEASES PROG NOTE
Assessment/Plan


Assessment/Plan








Full consult dictated:





A)


   1) klebsiella uti


   2) possible sepsis, leukocytosis, fevers 


   3) on steroids


   4) sc - neg, chest x-ray without consolidation





P)


   1) start ceftriaxone


   2) monitor labs and chest x-ray 


   3) thank you





Subjective


Constitutional:  Reports: fever


Allergies:  


Coded Allergies:  


     SULFA (SULFONAMIDE ANTIBIOTICS) (Unverified  Allergy, Unknown, 7/26/19)


Uncoded Allergies:  


     SULFA (Allergy, Unknown, 7/25/19)





Objective


Vital Signs





Last 24 Hour Vital Signs








  Date Time  Temp Pulse Resp B/P (MAP) Pulse Ox O2 Delivery O2 Flow Rate FiO2


 


7/28/19 14:36    114/63    


 


7/28/19 13:16  97 17     40


 


7/28/19 13:15  88 14  100 Mechanical Ventilator  40


 


7/28/19 13:07  97  131/64    


 


7/28/19 13:00  90 12  100 Mechanical Ventilator  40


 


7/28/19 13:00  93 18 131/64 (86) 99   


 


7/28/19 12:00  94 16 126/61 (82) 99   


 


7/28/19 11:25  90 14     40


 


7/28/19 11:00  92 17 140/71 (94) 99   


 


7/28/19 10:00  84 13 146/79 (101) 99   


 


7/28/19 09:26  89  153/81    


 


7/28/19 09:01  84 14     40


 


7/28/19 09:00  87 20 153/81 (105) 100   


 


7/28/19 08:00  85      


 


7/28/19 08:00 98.6 86 18 141/68 (92) 100   


 


7/28/19 08:00        40


 


7/28/19 08:00      Mechanical Ventilator  


 


7/28/19 07:00  85 18 125/64 (84) 100   


 


7/28/19 06:40     100   


 


7/28/19 06:35  74 12  100 Mechanical Ventilator  40


 


7/28/19 06:30  83 14     40


 


7/28/19 06:25  84 14  100 Mechanical Ventilator  40


 


7/28/19 06:00  80 16 134/69 (90) 100   


 


7/28/19 05:08  88 12     40


 


7/28/19 05:00  89  125/62    


 


7/28/19 05:00    125/62    


 


7/28/19 05:00  93 16 150/62 (91) 100   


 


7/28/19 04:00  88      


 


7/28/19 04:00      Mechanical Ventilator  


 


7/28/19 04:00 99.0 87 16 122/60 (80) 100   


 


7/28/19 04:00        40


 


7/28/19 03:01  89 14     40


 


7/28/19 03:00  82 18 125/62 (83) 100   


 


7/28/19 02:00  81 12 99/57 (71) 99   


 


7/28/19 01:29  76 12  100 Mechanical Ventilator  40


 


7/28/19 01:22  75 12     40


 


7/28/19 01:21  83 12  100 Mechanical Ventilator  40


 


7/28/19 01:00  80 12 118/63 (81) 100   


 


7/28/19 00:00 98.3 78 20 119/61 (80) 100   


 


7/28/19 00:00        40


 


7/28/19 00:00  65      


 


7/28/19 00:00      Mechanical Ventilator  


 


7/27/19 23:32  91  113/59    


 


7/27/19 23:29  83 16     40


 


7/27/19 23:00  92 15 113/59 (77) 100   


 


7/27/19 22:00  90 16 104/54 (71) 100   


 


7/27/19 21:25  82 18     40


 


7/27/19 21:06    130/72    


 


7/27/19 21:00  83 17 130/72 (91) 100   


 


7/27/19 20:00        40


 


7/27/19 20:00 99.3 81 21 124/75 (91) 100   


 


7/27/19 20:00      Mechanical Ventilator  


 


7/27/19 20:00  82      


 


7/27/19 19:41  85 16  100 Mechanical Ventilator  40


 


7/27/19 19:33  81 16     40


 


7/27/19 19:31  84 16  100 Mechanical Ventilator  40


 


7/27/19 19:00  84 19 129/76 (93) 100   


 


7/27/19 18:40  113  129/58    


 


7/27/19 18:00  107 24 129/58 (81) 99   


 


7/27/19 17:00  110 19 130/58 (82) 99   


 


7/27/19 16:51  126 20     40


 


7/27/19 16:00  112      


 


7/27/19 16:00        40


 


7/27/19 16:00  110 20 130/68 (88) 99   


 


7/27/19 16:00 99.6 110 20 130/68 (88) 99   


 


7/27/19 16:00      Mechanical Ventilator  








Height (Feet):  5


Height (Inches):  4.00


Weight (Pounds):  170





Microbiology








 Date/Time


Source Procedure


Growth Status


 


 


 7/26/19 16:20


Sputum Gram Stain - Final Complete


 


 7/26/19 16:20


Sputum Sputum Culture - Final


NORMAL UPPER RESPIRATORY PEYTON AT 48 ... Complete


 


 7/26/19 04:30


Nose MRSA Culture - Final


NO METHICILLIN RESISTANT STAPH AUREUS... Complete


 


 7/25/19 23:30


Urine,Clean Catch Urine Culture - Final


Klebsiella Pneumoniae Complete


 


 7/26/19 04:30


Rectum - Final


NO CARBAPENEM-RESISTANT ENTEROBACTERI... Complete


 


 7/26/19 04:30


Rectum VRE Culture - Final


NO VANCOMYCIN RESISTANT ENTEROCOCCUS ... Complete











Laboratory Tests








Test


  7/28/19


05:20 7/28/19


08:00 7/28/19


13:21


 


White Blood Count


  21.5 K/UL


(4.8-10.8)  #H 21.9 K/UL


(4.8-10.8)  H 


 


 


Red Blood Count


  4.74 M/UL


(4.20-5.40) 4.69 M/UL


(4.20-5.40) 


 


 


Hemoglobin


  13.1 G/DL


(12.0-16.0) 13.1 G/DL


(12.0-16.0) 


 


 


Hematocrit


  41.6 %


(37.0-47.0) 41.3 %


(37.0-47.0) 


 


 


Mean Corpuscular Volume 88 FL (80-99)   88 FL (80-99)   


 


Mean Corpuscular Hemoglobin


  27.7 PG


(27.0-31.0) 27.9 PG


(27.0-31.0) 


 


 


Mean Corpuscular Hemoglobin


Concent 31.5 G/DL


(32.0-36.0)  L 31.6 G/DL


(32.0-36.0)  L 


 


 


Red Cell Distribution Width


  15.2 %


(11.6-14.8)  H 15.5 %


(11.6-14.8)  H 


 


 


Platelet Count


  571 K/UL


(150-450)  H 629 K/UL


(150-450)  H 


 


 


Mean Platelet Volume


  5.6 FL


(6.5-10.1)  L 5.4 FL


(6.5-10.1)  L 


 


 


Neutrophils (%) (Auto)


  % (45.0-75.0)


  % (45.0-75.0)


  


 


 


Lymphocytes (%) (Auto)


  % (20.0-45.0)


  % (20.0-45.0)


  


 


 


Monocytes (%) (Auto)  % (1.0-10.0)    % (1.0-10.0)   


 


Eosinophils (%) (Auto)  % (0.0-3.0)    % (0.0-3.0)   


 


Basophils (%) (Auto)  % (0.0-2.0)    % (0.0-2.0)   


 


Differential Total Cells


Counted 100  


  100  


  


 


 


Neutrophils % (Manual) 86 % (45-75)  H 82 % (45-75)  H 


 


Lymphocytes % (Manual) 7 % (20-45)  L 8 % (20-45)  L 


 


Monocytes % (Manual) 7 % (1-10)   10 % (1-10)   


 


Eosinophils % (Manual) 0 % (0-3)   0 % (0-3)   


 


Basophils % (Manual) 0 % (0-2)   0 % (0-2)   


 


Band Neutrophils 0 % (0-8)   0 % (0-8)   


 


Platelet Estimate Increased  H Increased  H 


 


Platelet Morphology Normal   Normal   


 


Polychromasia 1+   1+   


 


Hypochromasia 1+   1+   


 


Anisocytosis 1+   1+   


 


Sodium Level


  143 MMOL/L


(136-145) 141 MMOL/L


(136-145) 


 


 


Potassium Level


  3.4 MMOL/L


(3.5-5.1)  L 3.5 MMOL/L


(3.5-5.1) 


 


 


Chloride Level


  96 MMOL/L


()  L 101 MMOL/L


() 


 


 


Carbon Dioxide Level


  35 MMOL/L


(21-32)  H 38 MMOL/L


(21-32)  H 


 


 


Anion Gap


  12 mmol/L


(5-15) 2 mmol/L


(5-15)  L 


 


 


Blood Urea Nitrogen


  67 mg/dL


(7-18)  H 70 mg/dL


(7-18)  H 


 


 


Creatinine


  2.9 MG/DL


(0.55-1.30)  H 2.8 MG/DL


(0.55-1.30)  H 


 


 


Estimat Glomerular Filtration


Rate  mL/min (>60)  


   mL/min (>60)  


  


 


 


Glucose Level


  161 MG/DL


()  H 151 MG/DL


()  H 


 


 


Calcium Level


  9.7 MG/DL


(8.5-10.1) 9.3 MG/DL


(8.5-10.1) 


 


 


Total Bilirubin


  0.5 MG/DL


(0.2-1.0) 


  


 


 


Aspartate Amino Transf


(AST/SGOT) 22 U/L (15-37)


  


  


 


 


Alanine Aminotransferase


(ALT/SGPT) 13 U/L (12-78)


  


  


 


 


Alkaline Phosphatase


  127 U/L


()  H 


  


 


 


Troponin I


  0.194 ng/mL


(0.000-0.056) 0.139 ng/mL


(0.000-0.056) 


 


 


Pro-B-Type Natriuretic Peptide


  4990 pg/mL


(0-125)  H 


  


 


 


Total Protein


  7.5 G/DL


(6.4-8.2) 


  


 


 


Albumin


  2.9 G/DL


(3.4-5.0)  L 


  


 


 


Globulin 4.6 g/dL    


 


Albumin/Globulin Ratio


  0.6 (1.0-2.7)


L 


  


 


 


Magnesium Level


  


  2.6 MG/DL


(1.8-2.4)  H 


 


 


Arterial Blood pH


  


  


  7.494


(7.350-7.450)


 


Arterial Blood Partial


Pressure CO2 


  


  49.6 mmHg


(35.0-45.0)  H


 


Arterial Blood Partial


Pressure O2 


  


  116.5 mmHg


(75.0-100.0)  H


 


Arterial Blood HCO3


  


  


  37.3 mmol/L


(22.0-26.0)  H


 


Arterial Blood Oxygen


Saturation 


  


  98.0 %


()


 


Arterial Blood Base Excess   12.3 (-2-2)  *H


 


Jean Pierre Test   Positive  











Current Medications








 Medications


  (Trade)  Dose


 Ordered  Sig/Michele


 Route


 PRN Reason  Start Time


 Stop Time Status Last Admin


Dose Admin


 


 Acetaminophen


  (Tylenol)  650 mg  Q6H  PRN


 NG


 Mild Pain/Temp > 100.5  7/27/19 12:45


 8/26/19 12:44  7/27/19 13:03


 


 


 Albuterol/


 Ipratropium


  (Albuterol/


 Ipratropium)  3 ml  Q6HRT


 HHN


   7/26/19 13:00


 7/31/19 08:59  7/28/19 13:39


 


 


 Amlodipine


 Besylate


  (Norvasc)  5 mg  DAILY


 ORAL


   7/27/19 09:00


 8/26/19 08:59  7/28/19 09:26


 


 


 Apixaban


  (Eliquis)  5 mg  BID


 ORAL


   7/26/19 18:00


 8/25/19 08:59  7/28/19 09:26


 


 


 Aspirin


  (ASA)  81 mg  DAILY


 ORAL


   7/27/19 09:00


 8/25/19 08:59  7/28/19 09:26


 


 


 Atorvastatin


 Calcium


  (Lipitor)  80 mg  BEDTIME


 ORAL


   7/26/19 21:00


 8/25/19 20:59  7/27/19 21:06


 


 


 Clopidogrel


 Bisulfate


  (Plavix)  75 mg  DAILY


 ORAL


   7/27/19 09:00


 8/25/19 08:59  7/28/19 09:26


 


 


 Hydralazine HCl


  (Apresoline)  50 mg  EVERY 8  HOURS


 ORAL


   7/26/19 14:00


 8/25/19 05:59  7/28/19 14:36


 


 


 Insulin Aspart


  (NovoLOG)    EVERY 4  HOURS


 SUBQ


   7/26/19 21:00


 8/25/19 06:29  7/28/19 13:12


 


 


 Insulin Detemir


  (Levemir)  16 units  BID


 SUBQ


   7/27/19 09:00


 8/25/19 20:59  7/28/19 09:34


 


 


 Lorazepam


  (Ativan 2mg/ml


 1ml)  1 mg  Q2H  PRN


 IV


 For Anxiety  7/26/19 17:15


 8/2/19 17:14  7/26/19 17:42


 


 


 Methylprednisolone


 Sodium Succinate


  (Solu-MEDROL)  40 mg  DAILY


 IVP


   7/28/19 09:00


 8/27/19 08:59  7/28/19 09:25


 


 


 Metoprolol


 Tartrate


  (Lopressor)  50 mg  Q6HR


 ORAL


   7/27/19 18:30


 8/26/19 18:29  7/28/19 13:07


 


 


 Pantoprazole


  (Protonix)  40 mg  DAILY


 IVP


   7/26/19 20:45


 8/25/19 20:44  7/28/19 09:25


 

















Eldon Gould MD Jul 28, 2019 15:14

## 2019-07-28 NOTE — NUR
NURSE NOTES:

Patient reported to be alert and oriented times 4, able to follow commands. Patient awake 
and oriented. Family at the bedside speaking with patient. Heart rate  92 in atrial 
fibrillation with A-pacing, blood pressure 126/61, SpO2 99%, RR 17. Capnography showing 
44mmHg CO2 on the monitor. Patient's eyes PERRLA bilaterally. Patient's feet 5/5 strength. 
right arm 5/5 strength and left arm 4/5 strength. Patient has endotracheal tube with 20cm at 
the lip line. Patient on CPAP wit pressure support 12. Patient tolerating setting with 
oxygen saturation 99%. Patient has a right nares NGT that is patent and verified with 
aspiration and auscultation at this time. Patient will be started on tube feeding Glucerna 
1.2 at 60mL/hr per Dr Main. Patient skin intact. Patient has a right antecubital 20 gauge 
peripheral IV and a left antecubital 18 gauge IV. Both are patent, asymptomatic, and saline 
locked at this time. Patient has high WBC 21.5 this morning. ID doctor consulted. Will 
notify Dr Main. Repeat troponin trending down. Patient showing no sign of acute distress. 
Bed in low position with bed alarm on and call light in reach at this time.

-------------------------------------------------------------------------------

Addendum: 07/28/19 at 2001 by Sheryl Black RN

-------------------------------------------------------------------------------

Oral care and repositioning performed.

## 2019-07-28 NOTE — NUR
RD ASSESSMENT & RECOMMENDATIONS

SEE CARE ACTIVITY FOR COMPLETE ASSESSMENT



DAILY ESTIMATED NEEDS:

Needs based on Critcal care, pulmonary, DM 58.9kg adj  

22-28  kcals/kg 

[id number]  total kcals

1.2-2  g protein/kg

  g total protein 

Fluid per MD, on lasix  





NUTRITION DIAGNOSIS:

1) Swallowing difficulty r/t respiratory distress as evidenced by s/p

intubation, now on NGT feeds.

2) Altered nutrition related lab values r/t clinical status as evidenced

by elev WBC (21.9), elev pH (7.686), elev BUN (70), elev Creat (2.8), elev

BG on adm now improved (424->151), elev BNP (4990)



(CURRENT TF: Glucerna 1.2 @60 ml)



PO DIET RECOMMENDATIONS:

SLP eval upon extubation  



ENTERAL NUTRITION RECOMMENDATIONS-->> TF change to Glucerna 1.5 @40ml/hr x24 hrs 

to provide 960ml, 1440 kcal, 79g pro, 729ml free H2O  



- Rec TF change for less free fluid (h/o CHF, currently edematous w/ elev BNP).

- Start Glucerna 1.5 @20ml/hr for 6 hrs. Advance as tolerated 10ml/hr q4-6 hrs to goal.

- Flush per MD/ HOB over 30 degrees.





----

ADDITIONAL RECOMMENDATIONS:

1) NGT recs as above  

2) Monitor lytes, BG daily (on NGT feeds + lasix + solumedrol) 

3) On lasix, daily calibrated bed scale wts for accuracy  

4) SLP eval  upon extubation for appropriate texture 

5) Monitor tolerance to TF

## 2019-07-28 NOTE — NUR
NURSE NOTES:

Patient showed a brief episode of atrial fibrillation on the cardiac monitor at this time. 
Will perform 12 lead ECG and notify MD of result.

## 2019-07-28 NOTE — NUR
NURSE NOTES:Received pt asleep, but easily arousable to tactile stimulation. Orally 
intubated on SIMV mode, Bilateral soft wrist restraints on for safety to avoid nury 
extubation. Pt A fib rate 90s on the monitor. Pt also has a pacemaker not capturing mds were 
aware per RN PAUL Black. Bp stable at this time, afebrile.IVF been infusing well per left 
upper arm pICC line TKO nS. Site atraumatic. Pt also has Renee to gravity with lg amt of 
yellowish urine.Monitor I and O. Monitor lytes. NGt fdg re started at this time Glucerna 1.5 
at 20ml /hr Goal 40. No nausea nor vomiting noted at this time. HOB kept elevated. On 
aspiration precaution. Will continue to monitor.

## 2019-07-29 VITALS — SYSTOLIC BLOOD PRESSURE: 99 MMHG | DIASTOLIC BLOOD PRESSURE: 59 MMHG

## 2019-07-29 VITALS — DIASTOLIC BLOOD PRESSURE: 58 MMHG | SYSTOLIC BLOOD PRESSURE: 119 MMHG

## 2019-07-29 VITALS — DIASTOLIC BLOOD PRESSURE: 57 MMHG | SYSTOLIC BLOOD PRESSURE: 101 MMHG

## 2019-07-29 VITALS — DIASTOLIC BLOOD PRESSURE: 46 MMHG | SYSTOLIC BLOOD PRESSURE: 101 MMHG

## 2019-07-29 VITALS — SYSTOLIC BLOOD PRESSURE: 109 MMHG | DIASTOLIC BLOOD PRESSURE: 64 MMHG

## 2019-07-29 VITALS — SYSTOLIC BLOOD PRESSURE: 116 MMHG | DIASTOLIC BLOOD PRESSURE: 60 MMHG

## 2019-07-29 VITALS — DIASTOLIC BLOOD PRESSURE: 52 MMHG | SYSTOLIC BLOOD PRESSURE: 112 MMHG

## 2019-07-29 VITALS — DIASTOLIC BLOOD PRESSURE: 51 MMHG | SYSTOLIC BLOOD PRESSURE: 98 MMHG

## 2019-07-29 VITALS — DIASTOLIC BLOOD PRESSURE: 60 MMHG | SYSTOLIC BLOOD PRESSURE: 116 MMHG

## 2019-07-29 VITALS — SYSTOLIC BLOOD PRESSURE: 118 MMHG | DIASTOLIC BLOOD PRESSURE: 58 MMHG

## 2019-07-29 VITALS — SYSTOLIC BLOOD PRESSURE: 103 MMHG | DIASTOLIC BLOOD PRESSURE: 60 MMHG

## 2019-07-29 VITALS — SYSTOLIC BLOOD PRESSURE: 105 MMHG | DIASTOLIC BLOOD PRESSURE: 53 MMHG

## 2019-07-29 VITALS — SYSTOLIC BLOOD PRESSURE: 109 MMHG | DIASTOLIC BLOOD PRESSURE: 83 MMHG

## 2019-07-29 VITALS — SYSTOLIC BLOOD PRESSURE: 106 MMHG | DIASTOLIC BLOOD PRESSURE: 60 MMHG

## 2019-07-29 VITALS — DIASTOLIC BLOOD PRESSURE: 53 MMHG | SYSTOLIC BLOOD PRESSURE: 97 MMHG

## 2019-07-29 VITALS — DIASTOLIC BLOOD PRESSURE: 67 MMHG | SYSTOLIC BLOOD PRESSURE: 111 MMHG

## 2019-07-29 VITALS — DIASTOLIC BLOOD PRESSURE: 60 MMHG | SYSTOLIC BLOOD PRESSURE: 117 MMHG

## 2019-07-29 VITALS — DIASTOLIC BLOOD PRESSURE: 51 MMHG | SYSTOLIC BLOOD PRESSURE: 122 MMHG

## 2019-07-29 VITALS — DIASTOLIC BLOOD PRESSURE: 59 MMHG | SYSTOLIC BLOOD PRESSURE: 112 MMHG

## 2019-07-29 VITALS — SYSTOLIC BLOOD PRESSURE: 104 MMHG | DIASTOLIC BLOOD PRESSURE: 50 MMHG

## 2019-07-29 VITALS — DIASTOLIC BLOOD PRESSURE: 62 MMHG | SYSTOLIC BLOOD PRESSURE: 116 MMHG

## 2019-07-29 VITALS — SYSTOLIC BLOOD PRESSURE: 102 MMHG | DIASTOLIC BLOOD PRESSURE: 75 MMHG

## 2019-07-29 VITALS — DIASTOLIC BLOOD PRESSURE: 50 MMHG | SYSTOLIC BLOOD PRESSURE: 102 MMHG

## 2019-07-29 VITALS — SYSTOLIC BLOOD PRESSURE: 105 MMHG | DIASTOLIC BLOOD PRESSURE: 48 MMHG

## 2019-07-29 VITALS — SYSTOLIC BLOOD PRESSURE: 111 MMHG | DIASTOLIC BLOOD PRESSURE: 60 MMHG

## 2019-07-29 VITALS — SYSTOLIC BLOOD PRESSURE: 107 MMHG | DIASTOLIC BLOOD PRESSURE: 76 MMHG

## 2019-07-29 LAB
ADD MANUAL DIFF: YES
ALBUMIN SERPL-MCNC: 2.5 G/DL (ref 3.4–5)
ALBUMIN/GLOB SERPL: 0.7 {RATIO} (ref 1–2.7)
ALP SERPL-CCNC: 126 U/L (ref 46–116)
ALT SERPL-CCNC: 14 U/L (ref 12–78)
ANION GAP SERPL CALC-SCNC: 9 MMOL/L (ref 5–15)
AST SERPL-CCNC: 24 U/L (ref 15–37)
BILIRUB SERPL-MCNC: 0.3 MG/DL (ref 0.2–1)
BUN SERPL-MCNC: 82 MG/DL (ref 7–18)
CALCIUM SERPL-MCNC: 8.7 MG/DL (ref 8.5–10.1)
CHLORIDE SERPL-SCNC: 100 MMOL/L (ref 98–107)
CO2 SERPL-SCNC: 35 MMOL/L (ref 21–32)
CREAT SERPL-MCNC: 2.4 MG/DL (ref 0.55–1.3)
ERYTHROCYTE [DISTWIDTH] IN BLOOD BY AUTOMATED COUNT: 15.3 % (ref 11.6–14.8)
GLOBULIN SER-MCNC: 3.6 G/DL
HCT VFR BLD CALC: 37.5 % (ref 37–47)
HGB BLD-MCNC: 11.7 G/DL (ref 12–16)
MCV RBC AUTO: 89 FL (ref 80–99)
PLATELET # BLD: 537 K/UL (ref 150–450)
POTASSIUM SERPL-SCNC: 4 MMOL/L (ref 3.5–5.1)
RBC # BLD AUTO: 4.23 M/UL (ref 4.2–5.4)
SODIUM SERPL-SCNC: 144 MMOL/L (ref 136–145)
WBC # BLD AUTO: 20.1 K/UL (ref 4.8–10.8)

## 2019-07-29 RX ADMIN — IPRATROPIUM BROMIDE AND ALBUTEROL SULFATE SCH ML: .5; 3 SOLUTION RESPIRATORY (INHALATION) at 07:44

## 2019-07-29 RX ADMIN — INSULIN ASPART SCH UNITS: 100 INJECTION, SOLUTION INTRAVENOUS; SUBCUTANEOUS at 09:20

## 2019-07-29 RX ADMIN — ATORVASTATIN CALCIUM SCH MG: 80 TABLET, FILM COATED ORAL at 20:40

## 2019-07-29 RX ADMIN — PANTOPRAZOLE SODIUM SCH MG: 40 INJECTION, POWDER, FOR SOLUTION INTRAVENOUS at 09:00

## 2019-07-29 RX ADMIN — IPRATROPIUM BROMIDE AND ALBUTEROL SULFATE SCH ML: .5; 3 SOLUTION RESPIRATORY (INHALATION) at 01:00

## 2019-07-29 RX ADMIN — HYDRALAZINE HYDROCHLORIDE SCH MG: 50 TABLET ORAL at 22:00

## 2019-07-29 RX ADMIN — ASPIRIN 81 MG SCH MG: 81 TABLET ORAL at 09:00

## 2019-07-29 RX ADMIN — INSULIN DETEMIR SCH UNITS: 100 INJECTION, SOLUTION SUBCUTANEOUS at 18:00

## 2019-07-29 RX ADMIN — SODIUM CHLORIDE SCH MLS/HR: 0.9 INJECTION INTRAVENOUS at 18:28

## 2019-07-29 RX ADMIN — INSULIN ASPART SCH UNITS: 100 INJECTION, SOLUTION INTRAVENOUS; SUBCUTANEOUS at 20:41

## 2019-07-29 RX ADMIN — INSULIN ASPART SCH UNITS: 100 INJECTION, SOLUTION INTRAVENOUS; SUBCUTANEOUS at 16:43

## 2019-07-29 RX ADMIN — METOPROLOL TARTRATE SCH MG: 50 TABLET, FILM COATED ORAL at 05:39

## 2019-07-29 RX ADMIN — IPRATROPIUM BROMIDE AND ALBUTEROL SULFATE SCH ML: .5; 3 SOLUTION RESPIRATORY (INHALATION) at 19:42

## 2019-07-29 RX ADMIN — INSULIN ASPART SCH UNITS: 100 INJECTION, SOLUTION INTRAVENOUS; SUBCUTANEOUS at 12:17

## 2019-07-29 RX ADMIN — METHYLPREDNISOLONE SODIUM SUCCINATE SCH MG: 40 INJECTION, POWDER, LYOPHILIZED, FOR SOLUTION INTRAMUSCULAR; INTRAVENOUS at 09:00

## 2019-07-29 RX ADMIN — METOPROLOL TARTRATE SCH MG: 50 TABLET, FILM COATED ORAL at 12:23

## 2019-07-29 RX ADMIN — INSULIN ASPART SCH UNITS: 100 INJECTION, SOLUTION INTRAVENOUS; SUBCUTANEOUS at 04:57

## 2019-07-29 RX ADMIN — APIXABAN SCH MG: 5 TABLET, FILM COATED ORAL at 18:29

## 2019-07-29 RX ADMIN — HYDRALAZINE HYDROCHLORIDE SCH MG: 50 TABLET ORAL at 14:41

## 2019-07-29 RX ADMIN — INSULIN DETEMIR SCH UNITS: 100 INJECTION, SOLUTION SUBCUTANEOUS at 09:21

## 2019-07-29 RX ADMIN — HYDRALAZINE HYDROCHLORIDE SCH MG: 50 TABLET ORAL at 05:40

## 2019-07-29 RX ADMIN — IPRATROPIUM BROMIDE AND ALBUTEROL SULFATE SCH ML: .5; 3 SOLUTION RESPIRATORY (INHALATION) at 13:17

## 2019-07-29 RX ADMIN — METOPROLOL TARTRATE SCH MG: 50 TABLET, FILM COATED ORAL at 18:29

## 2019-07-29 RX ADMIN — APIXABAN SCH MG: 5 TABLET, FILM COATED ORAL at 09:00

## 2019-07-29 RX ADMIN — INSULIN ASPART SCH UNITS: 100 INJECTION, SOLUTION INTRAVENOUS; SUBCUTANEOUS at 01:12

## 2019-07-29 NOTE — NUR
NURSE NOTES:NGT fdg increased up to 30ml at this time. HOB kept elevated. on aspiration 
precautions.

## 2019-07-29 NOTE — NUR
*-* INSURANCE *-*



ALL CLINICALS AND REVIEWS HAVE BEEN FAXED TO:



[facility]

P: [phone]

F: [phone] (FAX CLINICALS)

## 2019-07-29 NOTE — PULMONOLOGY PROGRESS NOTE
Assessment/Plan


Assessment/Plan


1. Congestive heart failure.


2. Diabetes, out of control.


3. Altered mental status due to CO2 narcosis


4. COPD with resp failure


5. Sleep apnea.


6. Atrial fibrillation, not on anticoagulants.


7. Coronary heart disease.


8. Aortic atherosclerosis.


9. Old cerebral infarct





resp failure resolved


extubate


feed PO


WBC high due to steroids, taper


AC per cardiology





Subjective


ROS Limited/Unobtainable:  Yes


Allergies:  


Coded Allergies:  


     SULFA (SULFONAMIDE ANTIBIOTICS) (Unverified  Allergy, Unknown, 7/26/19)


Uncoded Allergies:  


     SULFA (Allergy, Unknown, 7/25/19)





Objective





Last 24 Hour Vital Signs








  Date Time  Temp Pulse Resp B/P (MAP) Pulse Ox O2 Delivery O2 Flow Rate FiO2


 


7/29/19 15:00  75 11 101/46 (64) 97   


 


7/29/19 14:41    101/57    


 


7/29/19 14:00  76 10 101/57 (72) 86   


 


7/29/19 13:18  96 18  95 Room Air 4.0 36


 


7/29/19 13:10  84 18  96 Nasal Cannula 4.0 36


 


7/29/19 13:00  87 13 109/83 (92) 94   


 


7/29/19 12:23  97  116/62    


 


7/29/19 12:18  88 16 116/62 (80) 98   


 


7/29/19 12:00      Mechanical Ventilator 6.0 





      Nasal Cannula  


 


7/29/19 12:00  78      


 


7/29/19 12:00 98.4 80 14 99/59 (72) 98   


 


7/29/19 12:00       6.0 


 


7/29/19 11:00  83 13 111/60 (77) 99   


 


7/29/19 10:00  86 13 118/58 (78) 99   


 


7/29/19 09:01  88  116/60    


 


7/29/19 09:00  85 14 105/53 (70) 99   


 


7/29/19 08:57     100 Nasal Cannula 4.0 36


 


7/29/19 08:56      Nasal Cannula 4.0 36


 


7/29/19 08:00  88      


 


7/29/19 08:00        40


 


7/29/19 08:00      Mechanical Ventilator  


 


7/29/19 08:00 98.1 81 14 112/59 (76) 100   


 


7/29/19 07:44  88 12  100 Mechanical Ventilator  40


 


7/29/19 07:37  75 12  100 Mechanical Ventilator  40


 


7/29/19 07:20  77 15     40


 


7/29/19 07:02  94 18 116/60 (78) 98   


 


7/29/19 07:00  84 18 117/60 (79) 98   


 


7/29/19 06:00  94 18 116/60 (78) 98   


 


7/29/19 05:40    112/52    


 


7/29/19 05:39  91  112/52    


 


7/29/19 05:12  89 16     40


 


7/29/19 05:00  92 17 112/52 (72) 98   


 


7/29/19 04:00 98.6 90 17 98/51 (67) 98   


 


7/29/19 04:00      Mechanical Ventilator  


 


7/29/19 04:00        40


 


7/29/19 04:00  87      


 


7/29/19 03:17  88 16     40


 


7/29/19 03:00  91 17 106/60 (75) 98   


 


7/29/19 02:00  87 17 104/50 (68) 98   


 


7/29/19 01:22  84 14  99 Mechanical Ventilator  40


 


7/29/19 01:00  83 14  99 Mechanical Ventilator  40


 


7/29/19 01:00  84 14 97/53 (68) 98   


 


7/29/19 01:00  84 14     40


 


7/29/19 00:00        40


 


7/29/19 00:00 98.8 92 16 102/50 (67) 99   


 


7/29/19 00:00  82      


 


7/29/19 00:00      Mechanical Ventilator  


 


7/28/19 23:57  96  105/57    


 


7/28/19 23:30  91 14     40


 


7/28/19 23:00  94 18 105/57 (73) 99   


 


7/28/19 22:00  88 17 103/54 (70) 99   


 


7/28/19 21:48    112/58    


 


7/28/19 21:18  78 13     40


 


7/28/19 21:00  85 18 112/58 (76) 99   


 


7/28/19 20:00  80      


 


7/28/19 20:00        40


 


7/28/19 20:00      Mechanical Ventilator  


 


7/28/19 20:00 99.0 83 16 126/76 (93) 100   


 


7/28/19 19:32  77 12  100 Mechanical Ventilator  40


 


7/28/19 19:22  82 15     40


 


7/28/19 19:21  81 14  100 Mechanical Ventilator  40


 


7/28/19 19:00  80 14 109/54 (72) 100   


 


7/28/19 18:00  92 20 133/66 (88) 99   


 


7/28/19 17:48  88  121/57    


 


7/28/19 17:25  90 15     40


 


7/28/19 17:00  92 18 121/57 (78) 100   

















Intake and Output  


 


 7/28/19 7/29/19





 18:59 06:59


 


Intake Total 270 ml 350 ml


 


Output Total 470 ml 995 ml


 


Balance -200 ml -645 ml


 


  


 


Free Water  60 ml


 


IV Total 50 ml 


 


Tube Feeding 190 ml 290 ml


 


Other 30 ml 


 


Output Urine Total 470 ml 995 ml








General Appearance:  no acute distress


HEENT:  atraumatic


Respiratory/Chest:  lungs clear


Cardiovascular:  normal rate





Microbiology








 Date/Time


Source Procedure


Growth Status


 


 


 7/28/19 10:30


Sputum Induced Gram Stain - Final Resulted


 


 7/28/19 10:30


Sputum Induced Sputum Culture


Pending Resulted





 7/26/19 16:20


Sputum Gram Stain - Final Complete


 


 7/26/19 16:20


Sputum Sputum Culture - Final


NORMAL UPPER RESPIRATORY PEYTON AT 48 ... Complete


 


 7/28/19 10:30


Indwelling Cath Urine Culture - Preliminary


NO GROWTH Resulted








Laboratory Tests


7/29/19 04:05: 


White Blood Count 20.1H, Red Blood Count 4.23, Hemoglobin 11.7L, Hematocrit 37.5

, Mean Corpuscular Volume 89, Mean Corpuscular Hemoglobin 27.7, Mean 

Corpuscular Hemoglobin Concent 31.3L, Red Cell Distribution Width 15.3H, 

Platelet Count 537H, Mean Platelet Volume 5.4L, Neutrophils (%) (Auto) , 

Lymphocytes (%) (Auto) , Monocytes (%) (Auto) , Eosinophils (%) (Auto) , 

Basophils (%) (Auto) , Differential Total Cells Counted 100, Neutrophils % (

Manual) 82H, Lymphocytes % (Manual) 11L, Monocytes % (Manual) 7, Eosinophils % (

Manual) 0, Basophils % (Manual) 0, Band Neutrophils 0, Platelet Estimate 

Adequate, Platelet Morphology Normal, Hypochromasia 1+, Sodium Level 144, 

Potassium Level 4.0, Chloride Level 100, Carbon Dioxide Level 35H, Anion Gap 9, 

Blood Urea Nitrogen 82H, Creatinine 2.4H, Estimat Glomerular Filtration Rate , 

Glucose Level 242H, Calcium Level 8.7, Magnesium Level 2.6H, Total Bilirubin 0.3

, Aspartate Amino Transf (AST/SGOT) 24, Alanine Aminotransferase (ALT/SGPT) 14, 

Alkaline Phosphatase 126H, Total Protein 6.1L, Albumin 2.5L, Globulin 3.6, 

Albumin/Globulin Ratio 0.7L





Current Medications








 Medications


  (Trade)  Dose


 Ordered  Sig/Michele


 Route


 PRN Reason  Start Time


 Stop Time Status Last Admin


Dose Admin


 


 Acetaminophen


  (Tylenol)  650 mg  Q6H  PRN


 NG


 Mild Pain/Temp > 100.5  7/27/19 12:45


 8/26/19 12:44  7/27/19 13:03


 


 


 Albuterol/


 Ipratropium


  (Albuterol/


 Ipratropium)  3 ml  Q6HRT


 HHN


   7/26/19 13:00


 7/31/19 08:59  7/29/19 13:17


 


 


 Amlodipine


 Besylate


  (Norvasc)  5 mg  DAILY


 ORAL


   7/27/19 09:00


 8/26/19 08:59  7/29/19 09:01


 


 


 Apixaban


  (Eliquis)  5 mg  BID


 ORAL


   7/26/19 18:00


 8/25/19 08:59  7/29/19 09:00


 


 


 Aspirin


  (ASA)  81 mg  DAILY


 ORAL


   7/27/19 09:00


 8/25/19 08:59  7/29/19 09:00


 


 


 Atorvastatin


 Calcium


  (Lipitor)  80 mg  BEDTIME


 ORAL


   7/26/19 21:00


 8/25/19 20:59  7/28/19 20:45


 


 


 Ceftriaxone


 Sodium 1 gm/


 Dextrose  50 ml @ 


 100 mls/hr  Q24H


 IVPB


   7/28/19 17:00


 8/4/19 16:59  7/28/19 17:48


 


 


 Clopidogrel


 Bisulfate


  (Plavix)  75 mg  DAILY


 ORAL


   7/27/19 09:00


 8/25/19 08:59  7/29/19 09:00


 


 


 Hydralazine HCl


  (Apresoline)  50 mg  EVERY 8  HOURS


 ORAL


   7/26/19 14:00


 8/25/19 05:59  7/29/19 14:41


 


 


 Insulin Aspart


  (NovoLOG)    EVERY 4  HOURS


 SUBQ


   7/26/19 21:00


 8/25/19 06:29  7/29/19 12:17


 


 


 Insulin Detemir


  (Levemir)  18 units  BID


 SUBQ


   7/29/19 09:00


 8/25/19 20:59  7/29/19 09:21


 


 


 Lorazepam


  (Ativan 2mg/ml


 1ml)  1 mg  Q2H  PRN


 IV


 For Anxiety  7/26/19 17:15


 8/2/19 17:14  7/26/19 17:42


 


 


 Methylprednisolone


 Sodium Succinate


  (Solu-MEDROL)  40 mg  DAILY


 IVP


   7/28/19 09:00


 8/27/19 08:59  7/29/19 09:00


 


 


 Metoprolol


 Tartrate


  (Lopressor)  50 mg  Q6HR


 ORAL


   7/27/19 18:30


 8/26/19 18:29  7/29/19 12:23


 


 


 Pantoprazole


  (Protonix)  40 mg  DAILY


 IVP


   7/26/19 20:45


 8/25/19 20:44  7/29/19 09:00


 

















Yakov Alva MD Jul 29, 2019 16:11

## 2019-07-29 NOTE — NUR
NURSE NOTES:

VS stable. O2sat remains at 99% while pt is currently on Venti-mask FIO2 40% while pt is 
asleep. Pt is a mouth-breather and O2sat drops to 80's with a nasal cannula while pt is 
asleep.

## 2019-07-29 NOTE — NUR
NURSE NOTES:

AM meds were administered via NG tube. NG tube was then removed by MD drummond. Pt currently 
remains on NC at 4L with O2sat at 96%. VS stable.

## 2019-07-29 NOTE — NUR
NURSE NOTES:

Received bedside report from GEN Chambers.Patient stable,no c/o pain,no respiratory distress 
noted,A-Fib on cardiac monitor,pt is with N/C @ 6L/min tolerated well,Mask @ night FiO2 
40%,skin intact,f/cath in place draining toward gravity,IV asymptomatic,intact on L AC 18G & 
R AC 20G SL,bed secured in a low safety position,call light within a reach,family at 
bedside,will continue to monitor and follow POC.

## 2019-07-29 NOTE — NUR
NURSE NOTES:

Pt is asleep. Pt is a mouth-breather and O2sat is not decreased to 88% while pt is asleep 
and on nasal cannula. RT at bedside and is switching pt to venturi-mask at 40%FIO2 and O2sat 
increasing now to 98%. VS remain stable.

## 2019-07-29 NOTE — NUR
NURSE NOTES:

Received change of shift report from Sweta BLEVINS. Pt is awake, alert, intubated, able to 
follow command, and responds by nodding head. ETT 7.5 at 20cm at left lipline with vent 
settings SIMV 12, , Peep 5, PS 12 with O2sat at 100%. Cardiac monitor displays AFib 
with heart rate in the 70's. NGT in place with feeding Glucerna 1.5 infusing at goal rate of 
40ml/hour. Pt is tolerating feeding well with no residual or s/s of nausea. Abdomen is 
large, round, soft, nontender to touch with active bowel sounds in all quadrants. Renee 
catheter is in place, draining clear/yellow urine. Pt has bilateral soft wrist restraints to 
prevent from pulling ET tube out. Skin integrity at restraint site is within normal limits. 
Head of bed is at 30 degrees, bed in lowest position with three side rails up, bed locked 
and call light within reach.

## 2019-07-29 NOTE — NUR
RESPIRATORY NOTE:

Extubated Patient at 0845. Placed on NC 4LPM 36% Fio2. etCO2 48 saturations 98%. Patient 
doing well, will continue to closely monitor.

## 2019-07-29 NOTE — NUR
NURSE NOTES:

Pt was seen by Dr Alva and was extubated by RT per MD order. Pt is currently on NC at 4L 
with O2sat at 98%. Head of bed is elevated. VS stable.

## 2019-07-29 NOTE — NUR
NURSE NOTES:

Patient stable,tolerated N/C with 6 L/min,no c/o pain no respiratory distress noted.Patient 
turned and repositioned.

## 2019-07-29 NOTE — NUR
NURSE NOTES:

Pt had BM x1, soft/brown/formed. Pt was cleaned, gown/linens changed. Pt was repositioned. 
VS stable.

## 2019-07-29 NOTE — NUR
NURSE NOTES:

Pt consumed 75% of dinner meal. Currently on nasal cannula at 6L with O2sat at 99%. VS 
stable. Pt was cleaned, gown/linens changed, and repositioned. Oral care done.

## 2019-07-29 NOTE — NUR
NURSE NOTES:

Pt was given lunch tray, clear liquid diet per MD order. Pt consumed over 50% of lunch meal. 
Currently maintained on nasal cannula at 4L with O2sat at 96%. VS stable.

## 2019-07-29 NOTE — NUR
SI;    RESP FAILURE S/P EXTUBATION

T. 98.1 HR 88 RR 14 B/P 145,77

4L NC O2 SAT @ 98%

WBC 20.1 BUN 82 CR 2.4







IS;     CEFTRIAXONE IV

SOLU MEDROL IV

PROTONIX IV

ALB HHN

*******ICU STATUS******

## 2019-07-29 NOTE — NUR
NURSE NOTES:

Restraints were discontinued after extubation and removal of NG tube. Pt is alert and 
oriented x4, able to comprehend and follow commands. Pt is primarily [place] speaker, 
however understands simple English.

## 2019-07-29 NOTE — GENERAL PROGRESS NOTE
Assessment/Plan


Problem List:  


(1) Bacteriuria


ICD Codes:  R82.71 - Bacteriuria


SNOMED:  03813018


(2) Uncontrolled diabetes mellitus


ICD Codes:  E11.65 - Type 2 diabetes mellitus with hyperglycemia


SNOMED:  54299364, 635856161


Qualifiers:  


   Qualified Codes:  E11.65 - Type 2 diabetes mellitus with hyperglycemia


(3) Acute exacerbation of CHF (congestive heart failure)


ICD Codes:  I50.9 - Heart failure, unspecified


SNOMED:  090994884, [id number]


Qualifiers:  


   Qualified Codes:  I50.9 - Heart failure, unspecified


Assessment/Plan:


increase Levemir to 18 units bid 


continue NISS every 4 hours





Subjective


ROS Limited/Unobtainable:  Yes


Allergies:  


Coded Allergies:  


     SULFA (SULFONAMIDE ANTIBIOTICS) (Unverified  Allergy, Unknown, 7/26/19)


Uncoded Allergies:  


     SULFA (Allergy, Unknown, 7/25/19)


Subjective


events noted 


on TF











Item Value  Date Time


 


Bedside Blood Glucose 219 mg/dl H 7/29/19 0457


 


Bedside Blood Glucose 168 mg/dl H 7/29/19 0112


 


Bedside Blood Glucose 182 mg/dl H 7/28/19 2114


 


Bedside Blood Glucose 181 mg/dl H 7/28/19 1801


 


Bedside Blood Glucose 141 mg/dl H 7/28/19 1312


 


Bedside Blood Glucose 130 mg/dl H 7/28/19 0934


 


Bedside Blood Glucose 141 mg/dl H 7/28/19 0501


 


Bedside Blood Glucose 135 mg/dl H 7/28/19 0040











Objective





Last 24 Hour Vital Signs








  Date Time  Temp Pulse Resp B/P (MAP) Pulse Ox O2 Delivery O2 Flow Rate FiO2


 


7/29/19 05:40    112/52    


 


7/29/19 05:39  91  112/52    


 


7/29/19 05:12  89 16     40


 


7/29/19 03:17  88 16     40


 


7/29/19 02:00  87 17 104/50 (68) 98   


 


7/29/19 01:22  84 14  99 Mechanical Ventilator  40


 


7/29/19 01:00  83 14  99 Mechanical Ventilator  40


 


7/29/19 01:00  84 14 97/53 (68) 98   


 


7/29/19 01:00  84 14     40


 


7/29/19 00:00        40


 


7/29/19 00:00 98.8 92 16 102/50 (67) 99   


 


7/29/19 00:00  82      


 


7/29/19 00:00      Mechanical Ventilator  


 


7/28/19 23:57  96  105/57    


 


7/28/19 23:30  91 14     40


 


7/28/19 23:00  94 18 105/57 (73) 99   


 


7/28/19 22:00  88 17 103/54 (70) 99   


 


7/28/19 21:48    112/58    


 


7/28/19 21:18  78 13     40


 


7/28/19 21:00  85 18 112/58 (76) 99   


 


7/28/19 20:00  80      


 


7/28/19 20:00        40


 


7/28/19 20:00      Mechanical Ventilator  


 


7/28/19 20:00 99.0 83 16 126/76 (93) 100   


 


7/28/19 19:32  77 12  100 Mechanical Ventilator  40


 


7/28/19 19:22  82 15     40


 


7/28/19 19:21  81 14  100 Mechanical Ventilator  40


 


7/28/19 19:00  80 14 109/54 (72) 100   


 


7/28/19 18:00  92 20 133/66 (88) 99   


 


7/28/19 17:48  88  121/57    


 


7/28/19 17:25  90 15     40


 


7/28/19 17:00  92 18 121/57 (78) 100   


 


7/28/19 16:00      Mechanical Ventilator  


 


7/28/19 16:00        40


 


7/28/19 16:00 99.1 94 15 125/74 (91) 100   


 


7/28/19 16:00  88      


 


7/28/19 15:29  85 14     40


 


7/28/19 15:00  93 17 115/59 (77) 99   


 


7/28/19 14:36    114/63    


 


7/28/19 14:00 98.8 88 14 114/63 (80) 99   


 


7/28/19 13:16  97 17     40


 


7/28/19 13:15  88 14  100 Mechanical Ventilator  40


 


7/28/19 13:07  97  131/64    


 


7/28/19 13:00  90 12  100 Mechanical Ventilator  40


 


7/28/19 13:00  93 18 131/64 (86) 99   


 


7/28/19 12:00      Mechanical Ventilator  


 


7/28/19 12:00        40


 


7/28/19 12:00  94 16 126/61 (82) 99   


 


7/28/19 12:00  93      


 


7/28/19 11:25  90 14     40


 


7/28/19 11:00  92 17 140/71 (94) 99   


 


7/28/19 10:00  84 13 146/79 (101) 99   


 


7/28/19 09:26  89  153/81    


 


7/28/19 09:01  84 14     40


 


7/28/19 09:00  87 20 153/81 (105) 100   


 


7/28/19 08:00  85      


 


7/28/19 08:00 98.6 86 18 141/68 (92) 100   


 


7/28/19 08:00        40


 


7/28/19 08:00      Mechanical Ventilator  


 


7/28/19 07:00  85 18 125/64 (84) 100   


 


7/28/19 06:40     100   


 


7/28/19 06:35  74 12  100 Mechanical Ventilator  40


 


7/28/19 06:30  83 14     40

















Intake and Output  


 


 7/28/19 7/29/19





 19:00 07:00


 


Intake Total 290 ml 180 ml


 


Output Total 525 ml 540 ml


 


Balance -235 ml -360 ml


 


  


 


Intake Free Water  30 ml


 


IV Total 50 ml 


 


Tube Feeding 210 ml 150 ml


 


Other 30 ml 


 


Output Urine Total 525 ml 540 ml








Laboratory Tests


7/28/19 08:00: 


White Blood Count 21.9H, Red Blood Count 4.69, Hemoglobin 13.1, Hematocrit 41.3

, Mean Corpuscular Volume 88, Mean Corpuscular Hemoglobin 27.9, Mean 

Corpuscular Hemoglobin Concent 31.6L, Red Cell Distribution Width 15.5H, 

Platelet Count 629H, Mean Platelet Volume 5.4L, Neutrophils (%) (Auto) , 

Lymphocytes (%) (Auto) , Monocytes (%) (Auto) , Eosinophils (%) (Auto) , 

Basophils (%) (Auto) , Differential Total Cells Counted 100, Neutrophils % (

Manual) 82H, Lymphocytes % (Manual) 8L, Monocytes % (Manual) 10, Eosinophils % (

Manual) 0, Basophils % (Manual) 0, Band Neutrophils 0, Platelet Estimate 

IncreasedH, Platelet Morphology Normal, Polychromasia 1+, Hypochromasia 1+, 

Anisocytosis 1+, Sodium Level 141, Potassium Level 3.5, Chloride Level 101, 

Carbon Dioxide Level 38H, Anion Gap 2L, Blood Urea Nitrogen 70H, Creatinine 2.8H

, Estimat Glomerular Filtration Rate , Glucose Level 151H, Calcium Level 9.3, 

Magnesium Level 2.6H, Troponin I 0.139H


7/28/19 13:21: 


Arterial Blood pH 7.494H, Arterial Blood Partial Pressure CO2 49.6H, Arterial 

Blood Partial Pressure O2 116.5H, Arterial Blood HCO3 37.3H, Arterial Blood 

Oxygen Saturation 98.0, Arterial Blood Base Excess 12.3*H, Jean Pierre Test Positive


7/29/19 04:05: 


White Blood Count 20.1H, Red Blood Count 4.23, Hemoglobin 11.7L, Hematocrit 37.5

, Mean Corpuscular Volume 89, Mean Corpuscular Hemoglobin 27.7, Mean 

Corpuscular Hemoglobin Concent 31.3L, Red Cell Distribution Width 15.3H, 

Platelet Count 537H, Mean Platelet Volume 5.4L, Neutrophils (%) (Auto) , 

Lymphocytes (%) (Auto) , Monocytes (%) (Auto) , Eosinophils (%) (Auto) , 

Basophils (%) (Auto) , Neutrophils % (Manual) [Pending], Lymphocytes % (Manual) 

[Pending], Platelet Estimate [Pending], Platelet Morphology [Pending], Sodium 

Level 144, Potassium Level 4.0, Chloride Level 100, Carbon Dioxide Level 35H, 

Anion Gap 9, Blood Urea Nitrogen 82H, Creatinine 2.4H, Estimat Glomerular 

Filtration Rate , Glucose Level 242H, Calcium Level 8.7, Magnesium Level 2.6H, 

Total Bilirubin 0.3, Aspartate Amino Transf (AST/SGOT) 24, Alanine 

Aminotransferase (ALT/SGPT) 14, Alkaline Phosphatase 126H, Total Protein 6.1L, 

Albumin 2.5L, Globulin 3.6, Albumin/Globulin Ratio 0.7L


Height (Feet):  5


Height (Inches):  4.00


Weight (Pounds):  170


General Appearance:  no apparent distress


Neck:  normal alignment


Cardiovascular:  arrhythmia


Respiratory/Chest:  decreased breath sounds


Abdomen:  normal bowel sounds


Edema:  2+ Arm (L), 2+ Arm (R), 2+ Leg (L), 2+ Leg (R), 2+ Pedal (L), 2+ Pedal (

R), 2+ Generalized


Objective





Current Medications








 Medications


  (Trade)  Dose


 Ordered  Sig/Michele


 Route


 PRN Reason  Start Time


 Stop Time Status Last Admin


Dose Admin


 


 Acetaminophen


  (Tylenol)  650 mg  Q6H  PRN


 NG


 Mild Pain/Temp > 100.5  7/27/19 12:45


 8/26/19 12:44  7/27/19 13:03


 


 


 Albuterol/


 Ipratropium


  (Albuterol/


 Ipratropium)  3 ml  Q6HRT


 HHN


   7/26/19 13:00


 7/31/19 08:59  7/29/19 01:00


 


 


 Amlodipine


 Besylate


  (Norvasc)  5 mg  DAILY


 ORAL


   7/27/19 09:00


 8/26/19 08:59  7/28/19 09:26


 


 


 Apixaban


  (Eliquis)  5 mg  BID


 ORAL


   7/26/19 18:00


 8/25/19 08:59  7/28/19 17:48


 


 


 Aspirin


  (ASA)  81 mg  DAILY


 ORAL


   7/27/19 09:00


 8/25/19 08:59  7/28/19 09:26


 


 


 Atorvastatin


 Calcium


  (Lipitor)  80 mg  BEDTIME


 ORAL


   7/26/19 21:00


 8/25/19 20:59  7/28/19 20:45


 


 


 Ceftriaxone


 Sodium 1 gm/


 Dextrose  50 ml @ 


 100 mls/hr  Q24H


 IVPB


   7/28/19 17:00


 8/4/19 16:59  7/28/19 17:48


 


 


 Clopidogrel


 Bisulfate


  (Plavix)  75 mg  DAILY


 ORAL


   7/27/19 09:00


 8/25/19 08:59  7/28/19 09:26


 


 


 Hydralazine HCl


  (Apresoline)  50 mg  EVERY 8  HOURS


 ORAL


   7/26/19 14:00


 8/25/19 05:59  7/29/19 05:40


 


 


 Insulin Aspart


  (NovoLOG)    EVERY 4  HOURS


 SUBQ


   7/26/19 21:00


 8/25/19 06:29  7/29/19 04:57


 


 


 Insulin Detemir


  (Levemir)  16 units  BID


 SUBQ


   7/27/19 09:00


 8/25/19 20:59  7/28/19 18:01


 


 


 Lorazepam


  (Ativan 2mg/ml


 1ml)  1 mg  Q2H  PRN


 IV


 For Anxiety  7/26/19 17:15


 8/2/19 17:14  7/26/19 17:42


 


 


 Methylprednisolone


 Sodium Succinate


  (Solu-MEDROL)  40 mg  DAILY


 IVP


   7/28/19 09:00


 8/27/19 08:59  7/28/19 09:25


 


 


 Metoprolol


 Tartrate


  (Lopressor)  50 mg  Q6HR


 ORAL


   7/27/19 18:30


 8/26/19 18:29  7/29/19 05:39


 


 


 Pantoprazole


  (Protonix)  40 mg  DAILY


 IVP


   7/26/19 20:45


 8/25/19 20:44  7/28/19 09:25


 

















Paolo Mendoza MD Jul 29, 2019 06:30

## 2019-07-29 NOTE — NUR
Social Work

This SW met patient who is currently alert/oriented x4, speaks Amharic only. This Sw spoke 
with daughter, Alma Quintana (primary decision maker; does not have an Advance Directive). 
Daughter requesting full code, full treatment. Patient lives with daughter;  family to 
assist with all ADLs (patient was ambulatory with walker-with seat, and has a shower chair). 
Daughter explains they were anticipating discharge to [facility] on [address] (arranged from 
Urgent Care), but is requesting either for patient to discharge to home with family care 
(and home health for P.T) or a SNF closer to her home ([facility] or [facility]). This Sw 
explained will need SNF to locate with available bed and in contract with [facility]. This SW 
explained how to switch from [facility] to Medicare benefits (per daughter requesting this 
information). Daughter believes patient will want to stay with [facility], however. P.T to 
evaluate patient here. Daughter has three steps to her home. Patient has home 02 (through 
[facility]), did not have a home care agency prior to this admission. Daughter denied 
any substance abuse history, while explains patient has situation depression from time to 
time, was not taking any antidepressants.

## 2019-07-30 VITALS — SYSTOLIC BLOOD PRESSURE: 117 MMHG | DIASTOLIC BLOOD PRESSURE: 61 MMHG

## 2019-07-30 VITALS — SYSTOLIC BLOOD PRESSURE: 103 MMHG | DIASTOLIC BLOOD PRESSURE: 56 MMHG

## 2019-07-30 VITALS — SYSTOLIC BLOOD PRESSURE: 125 MMHG | DIASTOLIC BLOOD PRESSURE: 59 MMHG

## 2019-07-30 VITALS — DIASTOLIC BLOOD PRESSURE: 56 MMHG | SYSTOLIC BLOOD PRESSURE: 125 MMHG

## 2019-07-30 VITALS — DIASTOLIC BLOOD PRESSURE: 68 MMHG | SYSTOLIC BLOOD PRESSURE: 97 MMHG

## 2019-07-30 VITALS — SYSTOLIC BLOOD PRESSURE: 136 MMHG | DIASTOLIC BLOOD PRESSURE: 66 MMHG

## 2019-07-30 VITALS — DIASTOLIC BLOOD PRESSURE: 60 MMHG | SYSTOLIC BLOOD PRESSURE: 103 MMHG

## 2019-07-30 VITALS — DIASTOLIC BLOOD PRESSURE: 66 MMHG | SYSTOLIC BLOOD PRESSURE: 128 MMHG

## 2019-07-30 VITALS — SYSTOLIC BLOOD PRESSURE: 118 MMHG | DIASTOLIC BLOOD PRESSURE: 63 MMHG

## 2019-07-30 VITALS — SYSTOLIC BLOOD PRESSURE: 110 MMHG | DIASTOLIC BLOOD PRESSURE: 68 MMHG

## 2019-07-30 VITALS — DIASTOLIC BLOOD PRESSURE: 57 MMHG | SYSTOLIC BLOOD PRESSURE: 123 MMHG

## 2019-07-30 VITALS — DIASTOLIC BLOOD PRESSURE: 59 MMHG | SYSTOLIC BLOOD PRESSURE: 104 MMHG

## 2019-07-30 VITALS — SYSTOLIC BLOOD PRESSURE: 123 MMHG | DIASTOLIC BLOOD PRESSURE: 57 MMHG

## 2019-07-30 VITALS — DIASTOLIC BLOOD PRESSURE: 69 MMHG | SYSTOLIC BLOOD PRESSURE: 117 MMHG

## 2019-07-30 VITALS — DIASTOLIC BLOOD PRESSURE: 58 MMHG | SYSTOLIC BLOOD PRESSURE: 111 MMHG

## 2019-07-30 VITALS — SYSTOLIC BLOOD PRESSURE: 131 MMHG | DIASTOLIC BLOOD PRESSURE: 70 MMHG

## 2019-07-30 VITALS — SYSTOLIC BLOOD PRESSURE: 125 MMHG | DIASTOLIC BLOOD PRESSURE: 61 MMHG

## 2019-07-30 VITALS — DIASTOLIC BLOOD PRESSURE: 57 MMHG | SYSTOLIC BLOOD PRESSURE: 113 MMHG

## 2019-07-30 VITALS — SYSTOLIC BLOOD PRESSURE: 104 MMHG | DIASTOLIC BLOOD PRESSURE: 59 MMHG

## 2019-07-30 LAB
ADD MANUAL DIFF: NO
ANION GAP SERPL CALC-SCNC: 5 MMOL/L (ref 5–15)
BUN SERPL-MCNC: 66 MG/DL (ref 7–18)
CALCIUM SERPL-MCNC: 9 MG/DL (ref 8.5–10.1)
CHLORIDE SERPL-SCNC: 101 MMOL/L (ref 98–107)
CO2 SERPL-SCNC: 38 MMOL/L (ref 21–32)
CREAT SERPL-MCNC: 1.6 MG/DL (ref 0.55–1.3)
ERYTHROCYTE [DISTWIDTH] IN BLOOD BY AUTOMATED COUNT: 15.7 % (ref 11.6–14.8)
HCT VFR BLD CALC: 39.5 % (ref 37–47)
HGB BLD-MCNC: 12.5 G/DL (ref 12–16)
MCV RBC AUTO: 89 FL (ref 80–99)
PLATELET # BLD: 536 K/UL (ref 150–450)
POTASSIUM SERPL-SCNC: 3.7 MMOL/L (ref 3.5–5.1)
RBC # BLD AUTO: 4.42 M/UL (ref 4.2–5.4)
SODIUM SERPL-SCNC: 144 MMOL/L (ref 136–145)
WBC # BLD AUTO: 15.9 K/UL (ref 4.8–10.8)

## 2019-07-30 RX ADMIN — HYDRALAZINE HYDROCHLORIDE SCH MG: 50 TABLET ORAL at 21:42

## 2019-07-30 RX ADMIN — INSULIN ASPART SCH UNITS: 100 INJECTION, SOLUTION INTRAVENOUS; SUBCUTANEOUS at 17:26

## 2019-07-30 RX ADMIN — ATORVASTATIN CALCIUM SCH MG: 80 TABLET, FILM COATED ORAL at 21:42

## 2019-07-30 RX ADMIN — APIXABAN SCH MG: 5 TABLET, FILM COATED ORAL at 18:00

## 2019-07-30 RX ADMIN — IPRATROPIUM BROMIDE AND ALBUTEROL SULFATE SCH ML: .5; 3 SOLUTION RESPIRATORY (INHALATION) at 12:49

## 2019-07-30 RX ADMIN — APIXABAN SCH MG: 5 TABLET, FILM COATED ORAL at 17:26

## 2019-07-30 RX ADMIN — HYDRALAZINE HYDROCHLORIDE SCH MG: 50 TABLET ORAL at 14:55

## 2019-07-30 RX ADMIN — PANTOPRAZOLE SODIUM SCH MG: 40 INJECTION, POWDER, FOR SOLUTION INTRAVENOUS at 10:22

## 2019-07-30 RX ADMIN — INSULIN DETEMIR SCH UNITS: 100 INJECTION, SOLUTION SUBCUTANEOUS at 10:56

## 2019-07-30 RX ADMIN — METOPROLOL TARTRATE SCH MG: 50 TABLET, FILM COATED ORAL at 14:58

## 2019-07-30 RX ADMIN — INSULIN ASPART SCH UNITS: 100 INJECTION, SOLUTION INTRAVENOUS; SUBCUTANEOUS at 21:41

## 2019-07-30 RX ADMIN — APIXABAN SCH MG: 5 TABLET, FILM COATED ORAL at 10:23

## 2019-07-30 RX ADMIN — IPRATROPIUM BROMIDE AND ALBUTEROL SULFATE SCH ML: .5; 3 SOLUTION RESPIRATORY (INHALATION) at 19:13

## 2019-07-30 RX ADMIN — IPRATROPIUM BROMIDE AND ALBUTEROL SULFATE SCH ML: .5; 3 SOLUTION RESPIRATORY (INHALATION) at 07:06

## 2019-07-30 RX ADMIN — INSULIN ASPART SCH UNITS: 100 INJECTION, SOLUTION INTRAVENOUS; SUBCUTANEOUS at 00:36

## 2019-07-30 RX ADMIN — INSULIN ASPART SCH UNITS: 100 INJECTION, SOLUTION INTRAVENOUS; SUBCUTANEOUS at 10:57

## 2019-07-30 RX ADMIN — SODIUM CHLORIDE SCH MLS/HR: 0.9 INJECTION INTRAVENOUS at 17:25

## 2019-07-30 RX ADMIN — METOPROLOL TARTRATE SCH MG: 50 TABLET, FILM COATED ORAL at 06:00

## 2019-07-30 RX ADMIN — IPRATROPIUM BROMIDE AND ALBUTEROL SULFATE SCH ML: .5; 3 SOLUTION RESPIRATORY (INHALATION) at 01:24

## 2019-07-30 RX ADMIN — METOPROLOL TARTRATE SCH MG: 50 TABLET, FILM COATED ORAL at 12:00

## 2019-07-30 RX ADMIN — METOPROLOL TARTRATE SCH MG: 50 TABLET, FILM COATED ORAL at 00:11

## 2019-07-30 RX ADMIN — ASPIRIN 81 MG SCH MG: 81 TABLET ORAL at 10:23

## 2019-07-30 RX ADMIN — METOPROLOL TARTRATE SCH MG: 50 TABLET, FILM COATED ORAL at 18:00

## 2019-07-30 RX ADMIN — INSULIN DETEMIR SCH UNITS: 100 INJECTION, SOLUTION SUBCUTANEOUS at 17:27

## 2019-07-30 RX ADMIN — HYDRALAZINE HYDROCHLORIDE SCH MG: 50 TABLET ORAL at 06:00

## 2019-07-30 RX ADMIN — INSULIN ASPART SCH UNITS: 100 INJECTION, SOLUTION INTRAVENOUS; SUBCUTANEOUS at 04:44

## 2019-07-30 NOTE — NUR
NURSE NOTES:

Bilateral SCDs were place on lower extremities for DVT prophylaxis per order. Venous duplex 
is negative. VS stable. AM meds were given and pt assisted with repositioning.

## 2019-07-30 NOTE — NUR
NURSE NOTES:

Pt.sleeping,no s/s of pain,V/S stable,turned and repositioned,will continue to monitor.

## 2019-07-30 NOTE — NUR
NURSE NOTES:

Pt was cleaned, linens/gown changed, and pt repositioned. VS stable. Order in place to 
transfer pt to tele per MD order. Awaiting for bed availability.

## 2019-07-30 NOTE — NUR
RD ASSESSMENT & RECOMMENDATIONS

SEE CARE ACTIVITY FOR COMPLETE ASSESSMENT



DAILY ESTIMATED NEEDS:

Needs based on Pulmonary, DM 58.9kg adj  

25-30  kcals/kg 

7315-9235  total kcals

1-1.5  g protein/kg

59-88  g total protein 

Fluid per MD, on lasix  



NUTRITION DIAGNOSIS:

1) Swallowing difficulty r/t respiratory distress as evidenced by s/p

intubation, now on NGT feeds.(INACTIVE)

2) Altered nutrition related lab values r/t clinical status as evidenced

by elev WBC (21.9-> 15.9), elev pH (7.686-> now wnl), elev BUN (66), elev

Creat (2.8-> 1.6), elev BG on adm now improved (424->151), elev BNP

(4990-> 2401)



CURRENT DIET: CCHO MED     



PO DIET RECOMMENDATIONS:

CCHO LOW  





ADDITIONAL RECOMMENDATIONS:

1) Monitor tolerance to oral diet , % po intake 

2) Monitor lytes, BG daily  

3) On lasix, daily calibrated bed scale wts for accuracy  

4) SLP eval  upon extubation for appropriate texture 

     Per RN pt swallows pills without difficulty

## 2019-07-30 NOTE — NUR
SI:    RESP FAILURE S/P EXTUBATION,UTI,CHF

T. 98.4 HR 90 RR 18 B/P 125/56

VM FIO2 30%

PH 7.41 PCO2 47.9 PO2 66.4 HCO3 31.4 O2 SAT 91.7

WBC 15.9 CO2 38 BUN 66 CR 1.6 BNP 2451





IS:    SOLU MEDROL IV

CEFTRIAXONE IV

ASA 

INSULIN SUBC

*******ICU STATUS******

## 2019-07-30 NOTE — NUR
NURSE NOTES:

Received change of shift report from Denise BLEVINS. Pt is asleep, awakens to name/voice, primarily 
Czech speaking, understands basic English, able to follow commands. Pt is on 2L of oxygen 
via nasal cannula with O2sat at %. Cardiac monitor displays AFib with heart rate 
fluctuating from 80's-100's. Bounding radial and weak pedal pulses palpated with mild edema 
on lower extremities noted. Abdomen is large, round, soft, nontender to touch with active 
bowel sounds in all quadrants. Renee catheter is in place, draining clear/yellow urine. Skin 
is intact. Peripheral IV access is present on right AC #20G and left AC #18G both saline 
locks. Bed is locked with three side rails up, in lowest position, head of bed elevated and 
call light within reach. Will continue to monitor pt and follow plan of care per MD orders 
and protocol.

## 2019-07-30 NOTE — NUR
NURSE NOTES:

Currently maintained on nasal cannula at 6 L/min with O2sat at 98%. VS stable.Oral care 
provided.

## 2019-07-30 NOTE — INFECTIOUS DISEASES PROG NOTE
Assessment/Plan


Assessment/Plan





ASSESSMENT AND PLAN:





1. klebsiella uti, sepsis, chf, respiratory failure leukocytosis, fevers





   - rocephin - day # 4 abx


   - extubated


   - monitor labs and chest x-ray 


   - icu care





2. Acute renal failure.


3. Anemia.


4. CHF.


5. Respiratory failure.


6. ICU care.


7. Diabetes.


8. Possible hypertension.


9. Blood sugar treatment per Endocrinology and primary.


10. Vent care per Dr. Alva and Dr. Main.


11. Falls.


12. Sick sinus syndrome, pacemaker.


13. Sleep apnea, COPD.


14. Anemia.


15. Diastolic heart disease.


16. Coronary artery disease.


17. CHF.


18. Dyslipidemia.


19. Neuropathy.


20. Obesity.


21. Vitamin D deficiency.


22. Allergies to sulfa drugs.


23. MAR is noted.


24. Case was discussed with RN.


25. Social history negative.


26. Family history noncontributory.


27. Continue treatment per primary consultants.





Subjective


Constitutional:  Reports: fatigue, other - extbated, responsive ; Denies: fever


HEENT:  Reports: congestion


Respiratory:  Reports: shortness of breath


Cardiovascular:  Denies: chest pain


Gastrointestinal/Abdominal:  Denies: nausea, vomiting, diarrhea


Genitourinary:  Reports: other - + zaidi


Neurologic:  Denies: headache


Skin:  Denies: rash


Hematologic:  Denies: bleeding


Musculoskeletal:  Denies: pain


Allergies:  


Coded Allergies:  


     SULFA (SULFONAMIDE ANTIBIOTICS) (Unverified  Allergy, Unknown, 7/26/19)


Uncoded Allergies:  


     SULFA (Allergy, Unknown, 7/25/19)





Objective


Vital Signs





Last 24 Hour Vital Signs








  Date Time  Temp Pulse Resp B/P (MAP) Pulse Ox O2 Delivery O2 Flow Rate FiO2


 


7/30/19 12:50  85 12  97 Nasal Cannula 2.0 28


 


7/30/19 12:42  89 14  96 Nasal Cannula 2.0 28


 


7/30/19 12:00       4.0 40


 


7/30/19 11:00  93 12 136/66 (89) 89   


 


7/30/19 10:22  77  125/61    


 


7/30/19 10:00  91 11 125/59 (81) 95   


 


7/30/19 09:00  90 13 110/68 (82) 92   


 


7/30/19 08:00       4.0 40


 


7/30/19 08:00      Venturi Mask 6.0 





      Venturi Mask  


 


7/30/19 08:00 98.4 90 18 125/56 (79) 93   


 


7/30/19 07:08     98 Nasal Cannula 4.0 31


 


7/30/19 07:07  77 19  98 Venturi Mask 4.0 31


 


7/30/19 07:00  78 16 125/61 (82) 93   


 


7/30/19 07:00  83 17  96 Venturi Mask 4.0 31


 


7/30/19 06:00  67 12 103/60 (74) 95   


 


7/30/19 06:00  67  103/60    


 


7/30/19 06:00    103/60    


 


7/30/19 05:00  79 12 103/56 (72) 94   


 


7/30/19 04:11  86      


 


7/30/19 04:00      Venturi Mask 6.0 





      Venturi Mask  


 


7/30/19 04:00       4.0 40


 


7/30/19 04:00  81 16 118/63 (81) 96   


 


7/30/19 03:00 97.9 87 13 131/70 (90) 97   


 


7/30/19 02:00  85 18 123/57 (79) 95   


 


7/30/19 01:32  78 18  100 Nasal Cannula 4.0 36


 


7/30/19 01:24  76 18  96 Nasal Cannula 4.0 36


 


7/30/19 01:00  74 16 123/57 (79) 98   


 


7/30/19 00:11  85  128/66    


 


7/30/19 00:00  81 16 128/66 (86) 98   


 


7/30/19 00:00      Nasal Cannula 6.0 





      Nasal Cannula 6.0 


 


7/29/19 23:39  77      


 


7/29/19 23:00  75 12 122/51 (74) 96   


 


7/29/19 22:00 98.2 75 15 119/58 (78) 98   


 


7/29/19 22:00    119/58    


 


7/29/19 21:00  69 14 109/64 (79) 99   


 


7/29/19 20:00       6.0 


 


7/29/19 20:00      Nasal Cannula 6.0 





      Nasal Cannula 6.0 


 


7/29/19 20:00  82 16 107/76 (86) 96   


 


7/29/19 19:59  79      


 


7/29/19 19:48  86 19  97 Nasal Cannula 4.0 36


 


7/29/19 19:40     98 Nasal Cannula 4.0 36


 


7/29/19 19:40  87 16  98 Nasal Cannula 4.0 36


 


7/29/19 19:00  79 11 102/75 (84) 96   


 


7/29/19 18:29  80  116/67    


 


7/29/19 18:00  81 18 111/67 (82) 98   


 


7/29/19 17:00 98.0 80 17 105/48 (67) 96   


 


7/29/19 16:00  78      


 


7/29/19 16:00      Venturi Mask 6.0 





      Venturi Mask  


 


7/29/19 16:00  76 15 103/60 (74) 98   


 


7/29/19 16:00       4.0 40


 


7/29/19 15:00  75 11 101/46 (64) 97   


 


7/29/19 14:41    101/57    


 


7/29/19 14:00  76 10 101/57 (72) 86   


 


7/29/19 13:18  96 18  95 Room Air 4.0 36








Height (Feet):  5


Height (Inches):  4.00


Weight (Pounds):  170


General Appearance:  no acute distress


HEENT:  normocephalic, atraumatic, anicteric, mucous membranes moist


Respiratory/Chest:  no accessory muscle use, crackles/rales, rhonchi - 

bilaterally


Cardiovascular:  normal rate, regular rhythm, no gallop/murmur, no JVD


Abdomen:  normal bowel sounds, soft, non tender, no organomegaly, non distended


Genitourinary:  other - + zaidi - urine clearer


Extremities:  no cyanosis


Skin:  no rash


Neurologic/Psychiatric:  CNs II-XII grossly normal, alert, responsive


Lymphatic:  no neck adenopathy


Musculoskeletal:  no effusion


Objective





Chest x-ray - 7/30/19 - 





Comparison:  7/28/2019


 


A single view chest radiograph was obtained.


 


Findings:


 


Pulmonary vascular congestion suspected. Lung volumes are low. Patient has been


extubated. The left lung base is obscured. Pacemaker is noted. Bones are 

osteopenic.


 


IMPRESSION:


 


Suspected pulmonary vascular congestion





Microbiology








 Date/Time


Source Procedure


Growth Status


 


 


 7/28/19 09:15


Blood Blood Culture - Preliminary


NO GROWTH AFTER 24 HOURS Resulted


 


 7/28/19 09:00


Blood Blood Culture - Preliminary


NO GROWTH AFTER 24 HOURS Resulted





 7/28/19 10:30


Sputum Induced Gram Stain - Final Resulted


 


 7/28/19 10:30


Sputum Induced Sputum Culture - Preliminary


NORMAL UPPER RESPIRATORY PEYTON PRESENT Resulted


 


 7/28/19 10:30


Indwelling Cath Urine Culture - Preliminary


NO GROWTH AFTER 24 HOURS Resulted











Laboratory Tests








Test


  7/30/19


03:45 7/30/19


08:10


 


White Blood Count


  15.9 K/UL


(4.8-10.8)  H 


 


 


Red Blood Count


  4.42 M/UL


(4.20-5.40) 


 


 


Hemoglobin


  12.5 G/DL


(12.0-16.0) 


 


 


Hematocrit


  39.5 %


(37.0-47.0) 


 


 


Mean Corpuscular Volume 89 FL (80-99)   


 


Mean Corpuscular Hemoglobin


  28.2 PG


(27.0-31.0) 


 


 


Mean Corpuscular Hemoglobin


Concent 31.5 G/DL


(32.0-36.0)  L 


 


 


Red Cell Distribution Width


  15.7 %


(11.6-14.8)  H 


 


 


Platelet Count


  536 K/UL


(150-450)  H 


 


 


Mean Platelet Volume


  5.5 FL


(6.5-10.1)  L 


 


 


Neutrophils (%) (Auto)


  % (45.0-75.0)


  


 


 


Lymphocytes (%) (Auto)


  % (20.0-45.0)


  


 


 


Monocytes (%) (Auto)  % (1.0-10.0)   


 


Eosinophils (%) (Auto)  % (0.0-3.0)   


 


Basophils (%) (Auto)  % (0.0-2.0)   


 


Sodium Level


  144 MMOL/L


(136-145) 


 


 


Potassium Level


  3.7 MMOL/L


(3.5-5.1) 


 


 


Chloride Level


  101 MMOL/L


() 


 


 


Carbon Dioxide Level


  38 MMOL/L


(21-32)  H 


 


 


Anion Gap


  5 mmol/L


(5-15) 


 


 


Blood Urea Nitrogen


  66 mg/dL


(7-18)  H 


 


 


Creatinine


  1.6 MG/DL


(0.55-1.30)  H 


 


 


Estimat Glomerular Filtration


Rate  mL/min (>60)  


  


 


 


Glucose Level


  98 MG/DL


()  # 


 


 


Calcium Level


  9.0 MG/DL


(8.5-10.1) 


 


 


Pro-B-Type Natriuretic Peptide


  2401 pg/mL


(0-125)  H 


 


 


Arterial Blood pH


  


  7.417


(7.350-7.450)


 


Arterial Blood Partial


Pressure CO2 


  49.9 mmHg


(35.0-45.0)  H


 


Arterial Blood Partial


Pressure O2 


  66.4 mmHg


(75.0-100.0)  L


 


Arterial Blood HCO3


  


  31.4 mmol/L


(22.0-26.0)  H


 


Arterial Blood Oxygen


Saturation 


  91.7 %


()  L


 


Arterial Blood Base Excess  5.8 (-2-2)  H


 


Jean Pierre Test  Positive  











Current Medications








 Medications


  (Trade)  Dose


 Ordered  Sig/Michele


 Route


 PRN Reason  Start Time


 Stop Time Status Last Admin


Dose Admin


 


 Acetaminophen


  (Tylenol)  650 mg  Q6H  PRN


 ORAL


 Mild Pain/Temp > 100.5  7/30/19 08:45


 8/26/19 12:44   


 


 


 Albuterol/


 Ipratropium


  (Albuterol/


 Ipratropium)  3 ml  Q6HRT


 HHN


   7/26/19 13:00


 7/31/19 08:59  7/30/19 12:49


 


 


 Amlodipine


 Besylate


  (Norvasc)  5 mg  DAILY


 ORAL


   7/27/19 09:00


 8/26/19 08:59  7/30/19 10:22


 


 


 Apixaban


  (Eliquis)  5 mg  BID


 ORAL


   7/26/19 18:00


 8/25/19 08:59  7/30/19 10:23


 


 


 Aspirin


  (ASA)  81 mg  DAILY


 ORAL


   7/27/19 09:00


 8/25/19 08:59  7/30/19 10:23


 


 


 Atorvastatin


 Calcium


  (Lipitor)  80 mg  BEDTIME


 ORAL


   7/26/19 21:00


 8/25/19 20:59  7/29/19 20:40


 


 


 Ceftriaxone


 Sodium 1 gm/


 Dextrose  50 ml @ 


 100 mls/hr  Q24H


 IVPB


   7/28/19 17:00


 8/4/19 16:59  7/29/19 18:28


 


 


 Clopidogrel


 Bisulfate


  (Plavix)  75 mg  DAILY


 ORAL


   7/27/19 09:00


 8/25/19 08:59  7/30/19 10:22


 


 


 Dextrose


  (Dextrose 50%)  25 ml  Q30M  PRN


 IV


 Hypoglycemia  7/30/19 07:15


 8/29/19 07:14   


 


 


 Dextrose


  (Dextrose 50%)  50 ml  Q30M  PRN


 IV


 Hypoglycemia  7/30/19 07:15


 8/29/19 07:14   


 


 


 Hydralazine HCl


  (Apresoline)  50 mg  EVERY 8  HOURS


 ORAL


   7/26/19 14:00


 8/25/19 05:59  7/29/19 14:41


 


 


 Insulin Aspart


  (NovoLOG)    BEFORE MEALS AND  HS


 SUBQ


   7/30/19 11:30


 8/29/19 11:29  7/30/19 10:57


 


 


 Insulin Detemir


  (Levemir)  10 units  BID


 SUBQ


   7/30/19 09:00


 8/25/19 20:59  7/30/19 10:56


 


 


 Lorazepam


  (Ativan 2mg/ml


 1ml)  1 mg  Q2H  PRN


 IV


 For Anxiety  7/26/19 17:15


 8/2/19 17:14  7/26/19 17:42


 


 


 Methylprednisolone


 Sodium Succinate


  (Solu-MEDROL)  20 mg  DAILY


 IVP


   7/30/19 09:00


 8/27/19 08:59  7/30/19 10:22


 


 


 Metoprolol


 Tartrate


  (Lopressor)  50 mg  Q6HR


 ORAL


   7/27/19 18:30


 8/26/19 18:29  7/30/19 00:11


 


 


 Pantoprazole


  (Protonix)  40 mg  DAILY


 IVP


   7/26/19 20:45


 8/25/19 20:44  7/30/19 10:22


 

















Eldon Gould MD Jul 30, 2019 13:21

## 2019-07-30 NOTE — GENERAL PROGRESS NOTE
Assessment/Plan


Problem List:  


(1) Bacteriuria


ICD Codes:  R82.71 - Bacteriuria


SNOMED:  08501686


(2) Uncontrolled diabetes mellitus


ICD Codes:  E11.65 - Type 2 diabetes mellitus with hyperglycemia


SNOMED:  59619907, 782729672


Qualifiers:  


   Qualified Codes:  E11.65 - Type 2 diabetes mellitus with hyperglycemia


(3) Acute exacerbation of CHF (congestive heart failure)


ICD Codes:  I50.9 - Heart failure, unspecified


SNOMED:  763414856, [id number]


Qualifiers:  


   Qualified Codes:  I50.9 - Heart failure, unspecified


Assessment/Plan:


reduce Levemir to 10 units bid 


change NISS every 4 hours to ac / hs





Subjective


Allergies:  


Coded Allergies:  


     SULFA (SULFONAMIDE ANTIBIOTICS) (Unverified  Allergy, Unknown, 7/26/19)


Uncoded Allergies:  


     SULFA (Allergy, Unknown, 7/25/19)


All Systems:  reviewed and negative except above


Subjective


events noted 


extubated 











Item Value  Date Time


 


Bedside Blood Glucose 91 mg/dl 7/30/19 0444


 


Bedside Blood Glucose 122 mg/dl H 7/30/19 0036


 


Bedside Blood Glucose 125 mg/dl H 7/29/19 2041


 


Bedside Blood Glucose 122 mg/dl H 7/29/19 1800


 


Bedside Blood Glucose 190 mg/dl H 7/29/19 1217


 


Bedside Blood Glucose 352 mg/dl H 7/29/19 0921


 


Bedside Blood Glucose 219 mg/dl H 7/29/19 0457











Objective





Last 24 Hour Vital Signs








  Date Time  Temp Pulse Resp B/P (MAP) Pulse Ox O2 Delivery O2 Flow Rate FiO2


 


7/30/19 07:08     98 Nasal Cannula 4.0 31


 


7/30/19 07:07  77 19  98 Venturi Mask 4.0 31


 


7/30/19 07:00  78 16 125/61 (82) 93   


 


7/30/19 07:00  83 17  96 Venturi Mask 4.0 31


 


7/30/19 06:00  67 12 103/60 (74) 95   


 


7/30/19 06:00  67  103/60    


 


7/30/19 06:00    103/60    


 


7/30/19 05:00  79 12 103/56 (72) 94   


 


7/30/19 04:11  86      


 


7/30/19 04:00      Venturi Mask 6.0 





      Venturi Mask  


 


7/30/19 04:00       4.0 40


 


7/30/19 04:00  81 16 118/63 (81) 96   


 


7/30/19 03:00 97.9 87 13 131/70 (90) 97   


 


7/30/19 02:00  85 18 123/57 (79) 95   


 


7/30/19 01:32  78 18  100 Nasal Cannula 4.0 36


 


7/30/19 01:24  76 18  96 Nasal Cannula 4.0 36


 


7/30/19 01:00  74 16 123/57 (79) 98   


 


7/30/19 00:11  85  128/66    


 


7/30/19 00:00  81 16 128/66 (86) 98   


 


7/30/19 00:00      Nasal Cannula 6.0 





      Nasal Cannula 6.0 


 


7/29/19 23:39  77      


 


7/29/19 23:00  75 12 122/51 (74) 96   


 


7/29/19 22:00 98.2 75 15 119/58 (78) 98   


 


7/29/19 22:00    119/58    


 


7/29/19 21:00  69 14 109/64 (79) 99   


 


7/29/19 20:00       6.0 


 


7/29/19 20:00      Nasal Cannula 6.0 





      Nasal Cannula 6.0 


 


7/29/19 20:00  82 16 107/76 (86) 96   


 


7/29/19 19:59  79      


 


7/29/19 19:48  86 19  97 Nasal Cannula 4.0 36


 


7/29/19 19:40     98 Nasal Cannula 4.0 36


 


7/29/19 19:40  87 16  98 Nasal Cannula 4.0 36


 


7/29/19 19:00  79 11 102/75 (84) 96   


 


7/29/19 18:29  80  116/67    


 


7/29/19 18:00  81 18 111/67 (82) 98   


 


7/29/19 17:00 98.0 80 17 105/48 (67) 96   


 


7/29/19 16:00  78      


 


7/29/19 16:00      Venturi Mask 6.0 





      Venturi Mask  


 


7/29/19 16:00  76 15 103/60 (74) 98   


 


7/29/19 16:00       4.0 40


 


7/29/19 15:00  75 11 101/46 (64) 97   


 


7/29/19 14:41    101/57    


 


7/29/19 14:00  76 10 101/57 (72) 86   


 


7/29/19 13:18  96 18  95 Room Air 4.0 36


 


7/29/19 13:10  84 18  96 Nasal Cannula 4.0 36


 


7/29/19 13:00  87 13 109/83 (92) 94   


 


7/29/19 12:23  97  116/62    


 


7/29/19 12:18  88 16 116/62 (80) 98   


 


7/29/19 12:00      Mechanical Ventilator 6.0 





      Nasal Cannula  


 


7/29/19 12:00  78      


 


7/29/19 12:00 98.4 80 14 99/59 (72) 98   


 


7/29/19 12:00       6.0 


 


7/29/19 11:00  83 13 111/60 (77) 99   


 


7/29/19 10:00  86 13 118/58 (78) 99   


 


7/29/19 09:01  88  116/60    


 


7/29/19 09:00  85 14 105/53 (70) 99   


 


7/29/19 08:57     100 Nasal Cannula 4.0 36


 


7/29/19 08:56      Nasal Cannula 4.0 36


 


7/29/19 08:00  88      


 


7/29/19 08:00        40


 


7/29/19 08:00      Mechanical Ventilator  


 


7/29/19 08:00 98.1 81 14 112/59 (76) 100   


 


7/29/19 07:44  88 12  100 Mechanical Ventilator  40


 


7/29/19 07:37  75 12  100 Mechanical Ventilator  40


 


7/29/19 07:20  77 15     40

















Intake and Output  


 


 7/29/19 7/30/19





 19:00 07:00


 


Intake Total 590 ml 300 ml


 


Output Total 755 ml 470 ml


 


Balance -165 ml -170 ml


 


  


 


Intake Oral 200 ml 300 ml


 


Free Water 30 ml 


 


IV Total 100 ml 


 


Tube Feeding 60 ml 


 


Other 200 ml 


 


Output Urine Total 755 ml 470 ml


 


# Bowel Movements 1 








Laboratory Tests


7/30/19 03:45: 


White Blood Count 15.9H, Red Blood Count 4.42, Hemoglobin 12.5, Hematocrit 39.5

, Mean Corpuscular Volume 89, Mean Corpuscular Hemoglobin 28.2, Mean 

Corpuscular Hemoglobin Concent 31.5L, Red Cell Distribution Width 15.7H, 

Platelet Count 536H, Mean Platelet Volume 5.5L, Neutrophils (%) (Auto) , 

Lymphocytes (%) (Auto) , Monocytes (%) (Auto) , Eosinophils (%) (Auto) , 

Basophils (%) (Auto) , Sodium Level 144, Potassium Level 3.7, Chloride Level 101

, Carbon Dioxide Level 38H, Anion Gap 5, Blood Urea Nitrogen 66H, Creatinine 

1.6H, Estimat Glomerular Filtration Rate , Glucose Level 98#, Calcium Level 9.0

, Pro-B-Type Natriuretic Peptide 2401H


Height (Feet):  5


Height (Inches):  4.00


Weight (Pounds):  170


General Appearance:  no apparent distress


Neck:  normal alignment


Cardiovascular:  normal rate


Respiratory/Chest:  decreased breath sounds


Abdomen:  normal bowel sounds


Pelvis:  normal external exam


Edema:  1+ Arm (L), 1+ Arm (R), 1+ Leg (L), 1+ Leg (R), 1+ Pedal (L), 1+ Pedal (

R), 1+ Generalized


Objective





Current Medications








 Medications


  (Trade)  Dose


 Ordered  Sig/Michele


 Route


 PRN Reason  Start Time


 Stop Time Status Last Admin


Dose Admin


 


 Acetaminophen


  (Tylenol)  650 mg  Q6H  PRN


 NG


 Mild Pain/Temp > 100.5  7/27/19 12:45


 8/26/19 12:44  7/27/19 13:03


 


 


 Albuterol/


 Ipratropium


  (Albuterol/


 Ipratropium)  3 ml  Q6HRT


 HHN


   7/26/19 13:00


 7/31/19 08:59  7/30/19 07:06


 


 


 Amlodipine


 Besylate


  (Norvasc)  5 mg  DAILY


 ORAL


   7/27/19 09:00


 8/26/19 08:59  7/29/19 09:01


 


 


 Apixaban


  (Eliquis)  5 mg  BID


 ORAL


   7/26/19 18:00


 8/25/19 08:59  7/29/19 18:29


 


 


 Aspirin


  (ASA)  81 mg  DAILY


 ORAL


   7/27/19 09:00


 8/25/19 08:59  7/29/19 09:00


 


 


 Atorvastatin


 Calcium


  (Lipitor)  80 mg  BEDTIME


 ORAL


   7/26/19 21:00


 8/25/19 20:59  7/29/19 20:40


 


 


 Ceftriaxone


 Sodium 1 gm/


 Dextrose  50 ml @ 


 100 mls/hr  Q24H


 IVPB


   7/28/19 17:00


 8/4/19 16:59  7/29/19 18:28


 


 


 Clopidogrel


 Bisulfate


  (Plavix)  75 mg  DAILY


 ORAL


   7/27/19 09:00


 8/25/19 08:59  7/29/19 09:00


 


 


 Hydralazine HCl


  (Apresoline)  50 mg  EVERY 8  HOURS


 ORAL


   7/26/19 14:00


 8/25/19 05:59  7/29/19 14:41


 


 


 Insulin Aspart


  (NovoLOG)    EVERY 4  HOURS


 SUBQ


   7/26/19 21:00


 8/25/19 06:29  7/30/19 00:36


 


 


 Insulin Detemir


  (Levemir)  18 units  BID


 SUBQ


   7/29/19 09:00


 8/25/19 20:59  7/29/19 18:00


 


 


 Lorazepam


  (Ativan 2mg/ml


 1ml)  1 mg  Q2H  PRN


 IV


 For Anxiety  7/26/19 17:15


 8/2/19 17:14  7/26/19 17:42


 


 


 Methylprednisolone


 Sodium Succinate


  (Solu-MEDROL)  20 mg  DAILY


 IVP


   7/30/19 09:00


 8/27/19 08:59   


 


 


 Metoprolol


 Tartrate


  (Lopressor)  50 mg  Q6HR


 ORAL


   7/27/19 18:30


 8/26/19 18:29  7/30/19 00:11


 


 


 Pantoprazole


  (Protonix)  40 mg  DAILY


 IVP


   7/26/19 20:45


 8/25/19 20:44  7/29/19 09:00


 

















Paolo Mendoza MD Jul 30, 2019 07:12

## 2019-07-30 NOTE — NUR
NURSE NOTES:

Received patient from ICU to TELE Rm 202-2. Received report from Trish BLEVINS. Patient denies 
any pain, on 2L NC and breathing is even and unlabored Patient was transferred with cardiac 
monitor. All patient's belonging accounted for and signed by the two nurses. Bed is on 
lowest level, brakes engaged for safety. Call light is within reach, fall risk precaution in 
place, bed alarm on. Will continue with the plan of care.

## 2019-07-30 NOTE — DIAGNOSTIC IMAGING REPORT
Indication: Dyspnea

 

Comparison:  7/28/2019

 

A single view chest radiograph was obtained.

 

Findings:

 

Pulmonary vascular congestion suspected. Lung volumes are low. Patient has been

extubated. The left lung base is obscured. Pacemaker is noted. Bones are osteopenic.

 

IMPRESSION:

 

Suspected pulmonary vascular congestion

## 2019-07-30 NOTE — NUR
TRANSFER TO FLOOR:

Patient transferred to Tele from ICU via hospital bed. Report given to Hussain BLEVINS. Pt was 
transferred while being monitored on tele monitor, VS stable. Pt's belonging's list was 
checked and signed with the receiving nurse in front of the pt. Pt's family was informed 
regarding the transfer. Endorsed plan of care.

## 2019-07-30 NOTE — NUR
NURSE NOTES:

Pt is resting with stable VS. Oral care done and pt repositioned. Pt remains on 2L nasal 
cannula with O2sat at 100%. Cardiac monitor displays Afib with heart rate in the 80's. Pt is 
afebrile.

## 2019-07-30 NOTE — PROGRESS NOTE
DATE:  07/29/2019

CARDIOLOGY PROGRESS NOTE



SUBJECTIVE:  The patient remains in the intensive care unit.  She has been

extubated.



OBJECTIVE:

VITAL SIGNS:  Blood pressure 101/46, heart rate 75, respiratory rate 18.

LUNGS:  Coarse breath sounds.  Few rhonchi.  No wheezes.

CARDIAC:  Regular rhythm and rate.  Normal S1, S2 with a fourth heart

sound.

ABDOMEN:  Soft, nontender.

EXTREMITIES:  Trace dependent edema.



LABORATORY DATA:  White count 20, hemoglobin 11.7.  Sodium 144, magnesium

2.6, potassium 4, bicarb 35, BUN 82, creatinine 2.4.  Albumin 2.5.



IMPRESSION:

1. Status post respiratory failure.

2. Paroxysmal atrial fibrillation.

3. Klebsiella urinary tract infection with sepsis.

4. Paroxysmal atrial fibrillation.

5. Acute on chronic diastolic congestive heart failure.

6. COPD exacerbation.

7. Acute on chronic renal failure.



RECOMMENDATIONS:

1. Antimicrobials.

2. Respiratory hygiene.

3. Steroid taper.

4. Monitor renal function.

5. Monitor volume status.

6. Recheck chest x-ray.

7. Apixaban for cardioembolic prophylaxis.









  ______________________________________________

  Keon Coker M.D. DR:  Kierra

D:  07/30/2019 01:54

T:  07/30/2019 02:07

JOB#:  [id number]

CC:

## 2019-07-30 NOTE — NUR
NURSE NOTES:

Pt stable,no c/o pain,oral care provided,clean and dry,reposition every 2 hrs.Pt on Ventury 
Mask 4L,FiO2 31% tolerated well O2 Sat 91-93%.Charge nurse aware

## 2019-07-30 NOTE — NUR
NURSE NOTES:



Received report from GEN Nixon. Patient is awake lying semi-peters's watching TV; resting 
comfortably. No signs of acute distress noted; denies pain at this time. AOx3-4; able to 
make needs known. Primarily [place] speaking. Checked IV sites; patent and flushed. No 
erythema, bleeding, or infiltration noted. Renee catheter draining well to gravity. Bed at 
lowest position, brakes on, siderails up x3. Call light within reach. Will continue to 
monitor.

## 2019-07-30 NOTE — NUR
*-* INSURANCE *-*



ALL CLINICALS AND REVIEWS HAVE BEEN FAXED TO:



[facility]

P: [phone]

F: [phone] (FAX CLINICALS)

## 2019-07-31 VITALS — SYSTOLIC BLOOD PRESSURE: 119 MMHG | DIASTOLIC BLOOD PRESSURE: 59 MMHG

## 2019-07-31 VITALS — DIASTOLIC BLOOD PRESSURE: 51 MMHG | SYSTOLIC BLOOD PRESSURE: 107 MMHG

## 2019-07-31 VITALS — SYSTOLIC BLOOD PRESSURE: 118 MMHG | DIASTOLIC BLOOD PRESSURE: 74 MMHG

## 2019-07-31 VITALS — DIASTOLIC BLOOD PRESSURE: 64 MMHG | SYSTOLIC BLOOD PRESSURE: 111 MMHG

## 2019-07-31 VITALS — DIASTOLIC BLOOD PRESSURE: 58 MMHG | SYSTOLIC BLOOD PRESSURE: 111 MMHG

## 2019-07-31 VITALS — SYSTOLIC BLOOD PRESSURE: 118 MMHG | DIASTOLIC BLOOD PRESSURE: 63 MMHG

## 2019-07-31 LAB
ADD MANUAL DIFF: NO
ALBUMIN SERPL-MCNC: 2.5 G/DL (ref 3.4–5)
ALBUMIN/GLOB SERPL: 0.6 {RATIO} (ref 1–2.7)
ALP SERPL-CCNC: 121 U/L (ref 46–116)
ALT SERPL-CCNC: 14 U/L (ref 12–78)
ANION GAP SERPL CALC-SCNC: 4 MMOL/L (ref 5–15)
AST SERPL-CCNC: 19 U/L (ref 15–37)
BASOPHILS NFR BLD AUTO: 0.4 % (ref 0–2)
BILIRUB SERPL-MCNC: 0.3 MG/DL (ref 0.2–1)
BUN SERPL-MCNC: 55 MG/DL (ref 7–18)
CALCIUM SERPL-MCNC: 8.5 MG/DL (ref 8.5–10.1)
CHLORIDE SERPL-SCNC: 98 MMOL/L (ref 98–107)
CO2 SERPL-SCNC: 37 MMOL/L (ref 21–32)
CREAT SERPL-MCNC: 1.8 MG/DL (ref 0.55–1.3)
EOSINOPHIL NFR BLD AUTO: 0.3 % (ref 0–3)
ERYTHROCYTE [DISTWIDTH] IN BLOOD BY AUTOMATED COUNT: 15.6 % (ref 11.6–14.8)
GLOBULIN SER-MCNC: 3.9 G/DL
HCT VFR BLD CALC: 35.7 % (ref 37–47)
HGB BLD-MCNC: 10.9 G/DL (ref 12–16)
LYMPHOCYTES NFR BLD AUTO: 10 % (ref 20–45)
MCV RBC AUTO: 91 FL (ref 80–99)
MONOCYTES NFR BLD AUTO: 8.1 % (ref 1–10)
NEUTROPHILS NFR BLD AUTO: 81.1 % (ref 45–75)
PLATELET # BLD: 480 K/UL (ref 150–450)
POTASSIUM SERPL-SCNC: 4 MMOL/L (ref 3.5–5.1)
RBC # BLD AUTO: 3.94 M/UL (ref 4.2–5.4)
SODIUM SERPL-SCNC: 139 MMOL/L (ref 136–145)
WBC # BLD AUTO: 10.8 K/UL (ref 4.8–10.8)

## 2019-07-31 RX ADMIN — SODIUM CHLORIDE SCH MLS/HR: 0.9 INJECTION INTRAVENOUS at 12:38

## 2019-07-31 RX ADMIN — METOPROLOL TARTRATE SCH MG: 50 TABLET, FILM COATED ORAL at 18:17

## 2019-07-31 RX ADMIN — ASPIRIN 81 MG SCH MG: 81 TABLET ORAL at 08:13

## 2019-07-31 RX ADMIN — INSULIN ASPART SCH UNITS: 100 INJECTION, SOLUTION INTRAVENOUS; SUBCUTANEOUS at 11:49

## 2019-07-31 RX ADMIN — INSULIN ASPART SCH UNITS: 100 INJECTION, SOLUTION INTRAVENOUS; SUBCUTANEOUS at 16:55

## 2019-07-31 RX ADMIN — APIXABAN SCH MG: 5 TABLET, FILM COATED ORAL at 18:17

## 2019-07-31 RX ADMIN — HYDRALAZINE HYDROCHLORIDE SCH MG: 50 TABLET ORAL at 06:00

## 2019-07-31 RX ADMIN — IPRATROPIUM BROMIDE AND ALBUTEROL SULFATE SCH ML: .5; 3 SOLUTION RESPIRATORY (INHALATION) at 07:16

## 2019-07-31 RX ADMIN — APIXABAN SCH MG: 5 TABLET, FILM COATED ORAL at 08:12

## 2019-07-31 RX ADMIN — HYDRALAZINE HYDROCHLORIDE SCH MG: 50 TABLET ORAL at 20:55

## 2019-07-31 RX ADMIN — METOPROLOL TARTRATE SCH MG: 50 TABLET, FILM COATED ORAL at 06:24

## 2019-07-31 RX ADMIN — IPRATROPIUM BROMIDE AND ALBUTEROL SULFATE SCH ML: .5; 3 SOLUTION RESPIRATORY (INHALATION) at 00:54

## 2019-07-31 RX ADMIN — INSULIN ASPART SCH UNITS: 100 INJECTION, SOLUTION INTRAVENOUS; SUBCUTANEOUS at 06:25

## 2019-07-31 RX ADMIN — INSULIN DETEMIR SCH UNITS: 100 INJECTION, SOLUTION SUBCUTANEOUS at 18:19

## 2019-07-31 RX ADMIN — METOPROLOL TARTRATE SCH MG: 50 TABLET, FILM COATED ORAL at 00:40

## 2019-07-31 RX ADMIN — ATORVASTATIN CALCIUM SCH MG: 80 TABLET, FILM COATED ORAL at 20:54

## 2019-07-31 RX ADMIN — HYDRALAZINE HYDROCHLORIDE SCH MG: 50 TABLET ORAL at 14:00

## 2019-07-31 RX ADMIN — INSULIN ASPART SCH UNITS: 100 INJECTION, SOLUTION INTRAVENOUS; SUBCUTANEOUS at 21:00

## 2019-07-31 RX ADMIN — INSULIN DETEMIR SCH UNITS: 100 INJECTION, SOLUTION SUBCUTANEOUS at 09:07

## 2019-07-31 RX ADMIN — METOPROLOL TARTRATE SCH MG: 50 TABLET, FILM COATED ORAL at 11:45

## 2019-07-31 NOTE — NUR
*-* INSURANCE *-*



ALL CLINICALS AND REVIEWS HAVE BEEN FAXED TO:



[facility]

P: [phone]

F: [phone] (FAX CLINICALS)

## 2019-07-31 NOTE — NUR
NURSE NOTES:

Received pt from GEN Walsh.  Patient is resting in stable condition. Pt is A/Ox3, [place] 
speaking. Cardiac monitor is in placed, IV site intact, asymptomatic and patent. Bed is in 
the lowest position with two side rails up and locked.Urinary Catheter in place and patent. 
Breathing unlabored with 2 liter Nasal Cannula. Call light and bed side table within reach. 
No signs and symptoms of acute distress noted at this time. Will continue plan of care.

## 2019-07-31 NOTE — PULMONOLOGY PROGRESS NOTE
Assessment/Plan


Assessment/Plan


1. Congestive heart failure.


2. Diabetes, out of control.


3. Altered mental status due to CO2 narcosis


4. COPD with resp failure


5. Sleep apnea.


6. Atrial fibrillation, not on anticoagulants.


7. Coronary heart disease.


8. Aortic atherosclerosis.


9. Old cerebral infarct





seen 7-30-19


resp failure, resolved


extubated without difficulty


feed PO


steroid taper


AC per cardiology


out of ICU





Subjective


Constitutional:  Reports: no symptoms


Respiratory:  Denies: shortness of breath


Allergies:  


Coded Allergies:  


     SULFA (SULFONAMIDE ANTIBIOTICS) (Unverified  Allergy, Unknown, 7/26/19)


Uncoded Allergies:  


     SULFA (Allergy, Unknown, 7/25/19)





Objective





Last 24 Hour Vital Signs








  Date Time  Temp Pulse Resp B/P (MAP) Pulse Ox O2 Delivery O2 Flow Rate FiO2


 


7/31/19 07:16     96 Nasal Cannula 2.0 28


 


7/31/19 07:16  70 18  96 Nasal Cannula 2.0 28


 


7/31/19 06:24  92  111/58    


 


7/31/19 06:00    111/58    


 


7/31/19 04:00  92      


 


7/31/19 04:00 97.4 98 18 111/58 (75) 95   


 


7/31/19 01:01  78 16  98 Nasal Cannula 2.0 28


 


7/31/19 00:54  75 18  96 Nasal Cannula 4.0 36


 


7/31/19 00:40  83  107/51    


 


7/31/19 00:00  83      


 


7/31/19 00:00 98.1 88 18 107/51 (69) 96   


 


7/30/19 21:42    113/57    


 


7/30/19 21:00      Nasal Cannula 2.0 


 


7/30/19 20:00 97.2 102 18 113/57 (75) 96   


 


7/30/19 20:00  101      


 


7/30/19 19:23  88 18  99 Nasal Cannula 2.0 28


 


7/30/19 19:22     96 Nasal Cannula 2.0 28


 


7/30/19 19:13  84 16  96 Nasal Cannula 2.0 28


 


7/30/19 17:00 98.4 108 14 104/59 (74) 95   


 


7/30/19 16:00      Venturi Mask 6.0 





      Venturi Mask  


 


7/30/19 16:00  113      


 


7/30/19 16:00  108 14 104/59 (74) 94   


 


7/30/19 16:00       4.0 40


 


7/30/19 15:00  111 20 117/61 (79) 94   


 


7/30/19 14:58  140  117/69    


 


7/30/19 14:55    117/69    


 


7/30/19 14:00  119 21 111/58 (75) 91   


 


7/30/19 13:00  114 19 97/68 (78) 92   


 


7/30/19 12:50  85 12  97 Nasal Cannula 2.0 28


 


7/30/19 12:42  89 14  96 Nasal Cannula 2.0 28


 


7/30/19 12:00       4.0 40


 


7/30/19 12:00  85      


 


7/30/19 12:00      Venturi Mask 6.0 





      Venturi Mask  


 


7/30/19 12:00 98.0 88 14 117/69 (85) 96   


 


7/30/19 11:00  93 12 136/66 (89) 89   


 


7/30/19 10:22  77  125/61    


 


7/30/19 10:00  91 11 125/59 (81) 95   


 


7/30/19 09:00  90 13 110/68 (82) 92   


 


7/30/19 08:00       4.0 40


 


7/30/19 08:00  88      


 


7/30/19 08:00      Venturi Mask 6.0 





      Venturi Mask  


 


7/30/19 08:00 98.4 90 18 125/56 (79) 93   

















Intake and Output  


 


 7/30/19 7/31/19





 19:00 07:00


 


Intake Total 550 ml 


 


Output Total 420 ml 


 


Balance 130 ml 


 


  


 


Intake Oral 450 ml 


 


IV Total 100 ml 


 


Output Urine Total 420 ml 








General Appearance:  no acute distress


HEENT:  atraumatic


Respiratory/Chest:  lungs clear, decreased breath sounds


Cardiovascular:  normal rate





Microbiology








 Date/Time


Source Procedure


Growth Status


 


 


 7/28/19 09:15


Blood Blood Culture - Preliminary


NO GROWTH AFTER 48 HOURS Resulted


 


 7/28/19 09:00


Blood Blood Culture - Preliminary


NO GROWTH AFTER 48 HOURS Resulted





 7/28/19 10:30


Sputum Induced Gram Stain - Final Complete


 


 7/28/19 10:30


Sputum Induced Sputum Culture - Final


NORMAL UPPER RESPIRATORY PEYTON PRESENT Complete


 


 7/28/19 10:30


Indwelling Cath Urine Culture - Preliminary


NO GROWTH AFTER 24 HOURS Resulted








Laboratory Tests


7/30/19 08:10: 


Arterial Blood pH 7.417, Arterial Blood Partial Pressure CO2 49.9H, Arterial 

Blood Partial Pressure O2 66.4L, Arterial Blood HCO3 31.4H, Arterial Blood 

Oxygen Saturation 91.7L, Arterial Blood Base Excess 5.8H, [place] Test Positive


7/31/19 05:27: 


White Blood Count 10.8, Red Blood Count 3.94L, Hemoglobin 10.9L, Hematocrit 

35.7L, Mean Corpuscular Volume 91, Mean Corpuscular Hemoglobin 27.7, Mean 

Corpuscular Hemoglobin Concent 30.6L, Red Cell Distribution Width 15.6H, 

Platelet Count 480H, Mean Platelet Volume 5.7L, Neutrophils (%) (Auto) 81.1H, 

Lymphocytes (%) (Auto) 10.0L, Monocytes (%) (Auto) 8.1, Eosinophils (%) (Auto) 

0.3, Basophils (%) (Auto) 0.4, Sodium Level 139, Potassium Level 4.0, Chloride 

Level 98, Carbon Dioxide Level 37H, Anion Gap 4L, Blood Urea Nitrogen 55H, 

Creatinine 1.8H, Estimat Glomerular Filtration Rate , Glucose Level 296#H, 

Calcium Level 8.5, Total Bilirubin 0.3, Aspartate Amino Transf (AST/SGOT) 19, 

Alanine Aminotransferase (ALT/SGPT) 14, Alkaline Phosphatase 121H, Total 

Protein 6.4, Albumin 2.5L, Globulin 3.9, Albumin/Globulin Ratio 0.6L





Current Medications








 Medications


  (Trade)  Dose


 Ordered  Sig/Michele


 Route


 PRN Reason  Start Time


 Stop Time Status Last Admin


Dose Admin


 


 Acetaminophen


  (Tylenol)  650 mg  Q6H  PRN


 ORAL


 Mild Pain/Temp > 100.5  7/30/19 18:15


 8/26/19 18:14   


 


 


 Albuterol/


 Ipratropium


  (Albuterol/


 Ipratropium)  3 ml  Q6HRT


 HHN


   7/30/19 19:00


 7/31/19 08:59  7/31/19 07:16


 


 


 Amlodipine


 Besylate


  (Norvasc)  5 mg  DAILY


 ORAL


   7/31/19 09:00


 8/26/19 08:59   


 


 


 Apixaban


  (Eliquis)  5 mg  BID


 ORAL


   7/30/19 18:00


 8/25/19 08:59   


 


 


 Aspirin


  (ASA)  81 mg  DAILY


 ORAL


   7/31/19 09:00


 8/25/19 08:59   


 


 


 Atorvastatin


 Calcium


  (Lipitor)  80 mg  BEDTIME


 ORAL


   7/30/19 21:00


 8/25/19 20:59  7/30/19 21:42


 


 


 Ceftriaxone


 Sodium 1 gm/


 Dextrose  50 ml @ 


 100 mls/hr  Q24H


 IVPB


   7/31/19 17:00


 8/4/19 16:59   


 


 


 Clopidogrel


 Bisulfate


  (Plavix)  75 mg  DAILY


 ORAL


   7/31/19 09:00


 8/25/19 08:59   


 


 


 Dextrose


  (Dextrose 50%)  25 ml  Q30M  PRN


 IV


 Hypoglycemia  7/30/19 18:15


 8/29/19 07:14   


 


 


 Dextrose


  (Dextrose 50%)  50 ml  Q30M  PRN


 IV


 Hypoglycemia  7/30/19 18:15


 8/29/19 07:14   


 


 


 Hydralazine HCl


  (Apresoline)  50 mg  EVERY 8  HOURS


 ORAL


   7/30/19 22:00


 8/25/19 05:59  7/30/19 21:42


 


 


 Insulin Aspart


  (NovoLOG)    BEFORE MEALS AND  HS


 SUBQ


   7/30/19 21:00


 8/29/19 11:29  7/31/19 06:25


 


 


 Insulin Detemir


  (Levemir)  10 units  BID


 SUBQ


   7/30/19 18:00


 8/25/19 20:59   


 


 


 Lorazepam


  (Ativan 2mg/ml


 1ml)  1 mg  Q2H  PRN


 IV


 For Anxiety  7/30/19 19:15


 8/2/19 17:14   


 


 


 Methylprednisolone


 Sodium Succinate


  (Solu-MEDROL)  20 mg  DAILY


 IVP


   7/31/19 09:00


 8/27/19 08:59   


 


 


 Metoprolol


 Tartrate


  (Lopressor)  50 mg  Q6HR


 ORAL


   7/30/19 18:00


 8/26/19 18:29  7/31/19 06:24


 


 


 Pantoprazole


  (Protonix)  40 mg  DAILY


 IVP


   7/31/19 09:00


 8/25/19 20:44   


 

















Yakov Alva MD Jul 31, 2019 07:21

## 2019-07-31 NOTE — DIAGNOSTIC IMAGING REPORT
--------------- APPROVED REPORT --------------





CPT Code: 01272



Present Symptoms

Comments: Screening





BILATERAL: Imaging reveals a patent deep venous system bilaterally. There is no evidence 

of thrombus within the femoral, popliteal or tibial segments. The greater saphenous veins 

are also within normal limits. Doppler indicates normal spontaneous flow within these 

segments.

## 2019-07-31 NOTE — NUR
HAND-OFF: 



Report given to GEN Willams. Patient is awake lying high peters's; eating breakfast. Renee 
catheter draining well to gravity. In stable condition.

## 2019-07-31 NOTE — NUR
PT EVALUATION NOTE

Patient seen for initial evaluation, see complete evaluation for details. Patient presents 
with generalized weakness, decreased safety awareness and impaired balance which affects 
patient's ability to perform mobility tasks. Patient requires min/mod assist for bed 
mobility and transfers, unable to ambulate due to c/o significant dizziness and weakness. 
Patient will benefit from skilled inpatient PT intervention to address strength, balance, 
safety and mobility. Recommend discharge to SNF for further rehab to improve level of 
function and safety once medically cleared by MD. Patient has 4 wheeled 

walker and shower chair at home; further DME needs to be determined based on patient's 
progress. 

-------------------------------------------------------------------------------

Addendum: 07/31/19 at 1500 by SUMAN MONTES PT

-------------------------------------------------------------------------------

Amended: Links added.

## 2019-07-31 NOTE — PULMONOLOGY PROGRESS NOTE
Assessment/Plan


Assessment/Plan


1. Congestive heart failure.


2. Diabetes, out of control.


3. Altered mental status due to CO2 narcosis


4. COPD with resp failure


5. Sleep apnea.


6. Atrial fibrillation, not on anticoagulants.


7. Coronary heart disease.


8. Aortic atherosclerosis.


9. Old cerebral infarct





resp failure, resolved


no distress


feed PO


steroid taper PO


AC per cardiology


PT eval


snf soon





Subjective


Constitutional:  Reports: no symptoms


Allergies:  


Coded Allergies:  


     SULFA (SULFONAMIDE ANTIBIOTICS) (Unverified  Allergy, Unknown, 7/26/19)


Uncoded Allergies:  


     SULFA (Allergy, Unknown, 7/25/19)





Objective





Last 24 Hour Vital Signs








  Date Time  Temp Pulse Resp B/P (MAP) Pulse Ox O2 Delivery O2 Flow Rate FiO2


 


7/31/19 12:00 97.7 93 20 119/59 (79) 95   


 


7/31/19 11:57  97      


 


7/31/19 11:45  104  119/59    


 


7/31/19 09:00      Nasal Cannula 2.0 





      Nasal Cannula 2.0 


 


7/31/19 08:12  86  118/74    


 


7/31/19 08:00 97.6 86 20 118/74 (89) 96   


 


7/31/19 07:45  94      


 


7/31/19 07:26  73 17  98 Nasal Cannula 2.0 28


 


7/31/19 07:16     96 Nasal Cannula 2.0 28


 


7/31/19 07:16  70 18  96 Nasal Cannula 2.0 28


 


7/31/19 06:24  92  111/58    


 


7/31/19 06:00    111/58    


 


7/31/19 04:00  92      


 


7/31/19 04:00 97.4 98 18 111/58 (75) 95   


 


7/31/19 01:01  78 16  98 Nasal Cannula 2.0 28


 


7/31/19 00:54  75 18  96 Nasal Cannula 4.0 36


 


7/31/19 00:40  83  107/51    


 


7/31/19 00:00  83      


 


7/31/19 00:00 98.1 88 18 107/51 (69) 96   


 


7/30/19 21:42    113/57    


 


7/30/19 21:00      Nasal Cannula 2.0 


 


7/30/19 20:00 97.2 102 18 113/57 (75) 96   


 


7/30/19 20:00  101      


 


7/30/19 19:23  88 18  99 Nasal Cannula 2.0 28


 


7/30/19 19:22     96 Nasal Cannula 2.0 28


 


7/30/19 19:13  84 16  96 Nasal Cannula 2.0 28


 


7/30/19 17:00 98.4 108 14 104/59 (74) 95   


 


7/30/19 16:00      Venturi Mask 6.0 





      Venturi Mask  


 


7/30/19 16:00  113      


 


7/30/19 16:00  108 14 104/59 (74) 94   


 


7/30/19 16:00       4.0 40


 


7/30/19 15:00  111 20 117/61 (79) 94   


 


7/30/19 14:58  140  117/69    


 


7/30/19 14:55    117/69    


 


7/30/19 14:00  119 21 111/58 (75) 91   


 


7/30/19 13:00  114 19 97/68 (78) 92   

















Intake and Output  


 


 7/30/19 7/31/19





 19:00 07:00


 


Intake Total 550 ml 500 ml


 


Output Total 420 ml 800 ml


 


Balance 130 ml -300 ml


 


  


 


Intake Oral 450 ml 500 ml


 


IV Total 100 ml 


 


Output Urine Total 420 ml 800 ml








General Appearance:  no acute distress


HEENT:  atraumatic


Respiratory/Chest:  lungs clear


Cardiovascular:  normal rate


Laboratory Tests


7/31/19 05:27: 


White Blood Count 10.8, Red Blood Count 3.94L, Hemoglobin 10.9L, Hematocrit 

35.7L, Mean Corpuscular Volume 91, Mean Corpuscular Hemoglobin 27.7, Mean 

Corpuscular Hemoglobin Concent 30.6L, Red Cell Distribution Width 15.6H, 

Platelet Count 480H, Mean Platelet Volume 5.7L, Neutrophils (%) (Auto) 81.1H, 

Lymphocytes (%) (Auto) 10.0L, Monocytes (%) (Auto) 8.1, Eosinophils (%) (Auto) 

0.3, Basophils (%) (Auto) 0.4, Sodium Level 139, Potassium Level 4.0, Chloride 

Level 98, Carbon Dioxide Level 37H, Anion Gap 4L, Blood Urea Nitrogen 55H, 

Creatinine 1.8H, Estimat Glomerular Filtration Rate , Glucose Level 296#H, 

Calcium Level 8.5, Total Bilirubin 0.3, Aspartate Amino Transf (AST/SGOT) 19, 

Alanine Aminotransferase (ALT/SGPT) 14, Alkaline Phosphatase 121H, Total 

Protein 6.4, Albumin 2.5L, Globulin 3.9, Albumin/Globulin Ratio 0.6L





Current Medications








 Medications


  (Trade)  Dose


 Ordered  Sig/Michele


 Route


 PRN Reason  Start Time


 Stop Time Status Last Admin


Dose Admin


 


 Acetaminophen


  (Tylenol)  650 mg  Q6H  PRN


 ORAL


 Mild Pain/Temp > 100.5  7/30/19 18:15


 8/26/19 18:14   


 


 


 Amlodipine


 Besylate


  (Norvasc)  5 mg  DAILY


 ORAL


   7/31/19 09:00


 8/26/19 08:59  7/31/19 08:12


 


 


 Apixaban


  (Eliquis)  5 mg  BID


 ORAL


   7/30/19 18:00


 8/25/19 08:59  7/31/19 08:12


 


 


 Aspirin


  (ASA)  81 mg  DAILY


 ORAL


   7/31/19 09:00


 8/25/19 08:59  7/31/19 08:13


 


 


 Atorvastatin


 Calcium


  (Lipitor)  80 mg  BEDTIME


 ORAL


   7/30/19 21:00


 8/25/19 20:59  7/30/19 21:42


 


 


 Ceftriaxone


 Sodium 1 gm/


 Sodium Chloride  55 ml @ 


 110 mls/hr  DAILY


 IVPB


   7/31/19 12:00


 8/4/19 23:00  7/31/19 12:38


 


 


 Clopidogrel


 Bisulfate


  (Plavix)  75 mg  DAILY


 ORAL


   7/31/19 09:00


 8/25/19 08:59  7/31/19 08:12


 


 


 Dextrose


  (Dextrose 50%)  25 ml  Q30M  PRN


 IV


 Hypoglycemia  7/30/19 18:15


 8/29/19 07:14   


 


 


 Dextrose


  (Dextrose 50%)  50 ml  Q30M  PRN


 IV


 Hypoglycemia  7/30/19 18:15


 8/29/19 07:14   


 


 


 Hydralazine HCl


  (Apresoline)  50 mg  EVERY 8  HOURS


 ORAL


   7/30/19 22:00


 8/25/19 05:59  7/30/19 21:42


 


 


 Insulin Aspart


  (NovoLOG)    BEFORE MEALS AND  HS


 SUBQ


   7/30/19 21:00


 8/29/19 11:29  7/31/19 11:49


 


 


 Insulin Detemir


  (Levemir)  12 units  BID


 SUBQ


   7/31/19 09:00


 8/25/19 20:59  7/31/19 09:07


 


 


 Lorazepam


  (Ativan 2mg/ml


 1ml)  1 mg  Q2H  PRN


 IV


 For Anxiety  7/30/19 19:15


 8/2/19 17:14   


 


 


 Methylprednisolone


 Sodium Succinate


  (Solu-MEDROL)  20 mg  DAILY


 IVP


   7/31/19 09:00


 8/27/19 08:59  7/31/19 08:13


 


 


 Metoprolol


 Tartrate


  (Lopressor)  50 mg  Q6HR


 ORAL


   7/30/19 18:00


 8/26/19 18:29  7/31/19 11:45


 


 


 Nateglinide


  (Starlix)  120 mg  TIAC


 ORAL


   7/31/19 11:30


 8/30/19 11:29  7/31/19 11:45


 


 


 Pantoprazole


  (Protonix)  40 mg  DAILY


 IVP


   7/31/19 09:00


 8/25/19 20:44  7/31/19 08:12


 


 


 Sitagliptin


 Phosphate


  (Januvia)  25 mg  ACBREAKFAST


 ORAL


   8/1/19 06:30


 8/31/19 06:29   


 

















Yakov Alva MD Jul 31, 2019 12:53

## 2019-07-31 NOTE — NUR
NURSE NOTES:



Patient is awake lying semi-peters's; resting comfortably. No signs of acute distress noted; 
denies pain at this time. On 2L nasal cannula.

## 2019-07-31 NOTE — NUR
SI:   CHF

T. 97.7 HR 93 RR 20 B/P 119/89

BUN 55 CR 1.8 CO2 37







IS:    CEFTRIAXONE IV

PREDNISONE PO

PLAVIX PO

*******TELE STATUS*****

## 2019-07-31 NOTE — PROGRESS NOTE
DATE:  07/30/2019

CARDIOLOGY PROGRESS NOTE



SUBJECTIVE:  The patient is extubated for over 24 hours, in no respiratory

distress.



OBJECTIVE:

VITAL SIGNS:  Blood pressure 125/61, pulse 78, respiratory rate 16.

 

LUNGS:  Few rhonchi.

HEART:  Regular rhythm and rate.  Normal S1, S2.

ABDOMEN:  Soft.

EXTREMITIES:  No edema.



DIAGNOSTIC AND LABORATORY DATA:  Chest x-ray reveals pulmonary vascular

congestion.  White count is 15.9, hemoglobin 12.5.  Sodium 144, potassium

3.7, BUN 66, creatinine 1.6.  Pro-natriuretic peptide has improved to

2400.



IMPRESSION:

1. Status post respiratory failure.

2. Healthcare-acquired pneumonia.

3. Acute on chronic diastolic congestive heart failure.

4. Acute on chronic renal failure.

5. Insulin-requiring diabetes mellitus.



PLAN:  Resume diuresis.  Monitor volume status and cardiorenal parameters

closely.  Continue cautious titration of antihypertension and anti-failure

regimen.  Steroid taper per pulmonologist.  Trend natriuretic peptide

assay.









  ______________________________________________

  Keon Coker M.D. DR:  CESIA

D:  07/31/2019 01:18

T:  07/31/2019 13:54

JOB#:  [id number]

CC:

## 2019-07-31 NOTE — NUR
NURSE NOTES:

Physical Therapist worked with the patient. Patient was so weak at this time and she might 
get more benefit if she discharge SNF vs home. Tomorrow morning she might be able to 
discharge home with home health if her condition progressing and based on DR. Alva 
decision.

## 2019-07-31 NOTE — NUR
NURSE NOTES:



Received report from GEN Willams. Patient is awake lying semi-peters's; resting comfortably. 
No signs of acute distress noted; denies pain at this time. AOx3-4; able to make needs 
known. Primarily Sinhala speaking. Checked IV site; patent and flushed. No erythema, 
bleeding, or infiltration noted. Renee catheter draining well to gravity. Bed at lowest 
position, brakes on, siderails up x3. Call light within reach. Will continue to monitor.

## 2019-07-31 NOTE — GENERAL PROGRESS NOTE
Assessment/Plan


Problem List:  


(1) Bacteriuria


ICD Codes:  R82.71 - Bacteriuria


SNOMED:  38598002


(2) Uncontrolled diabetes mellitus


ICD Codes:  E11.65 - Type 2 diabetes mellitus with hyperglycemia


SNOMED:  26299624, [id number]


Qualifiers:  


   Qualified Codes:  E11.65 - Type 2 diabetes mellitus with hyperglycemia


(3) Acute exacerbation of CHF (congestive heart failure)


ICD Codes:  I50.9 - Heart failure, unspecified


SNOMED:  144592351, [id number]


Qualifiers:  


   Qualified Codes:  I50.9 - Heart failure, unspecified


Assessment/Plan:


increase Levemir to 12 units bid 


add Januvia 50 mg daily 


add Starlix 120 mg ac tid 


continue to hold Metformin due to elevated Cr 


continue NISS ac / hs





Subjective


Allergies:  


Coded Allergies:  


     SULFA (SULFONAMIDE ANTIBIOTICS) (Unverified  Allergy, Unknown, 7/26/19)


Uncoded Allergies:  


     SULFA (Allergy, Unknown, 7/25/19)


All Systems:  reviewed and negative except above


Subjective


events noted 


transferred out of ICU 


glucose values are elevated 











Item Value  Date Time


 


Bedside Blood Glucose 257 mg/dl H 7/31/19 0630


 


Bedside Blood Glucose 313 mg/dl H 7/30/19 2141


 


Bedside Blood Glucose 261 mg/dl H 7/30/19 1727


 


Bedside Blood Glucose 116 mg/dl 7/30/19 1200


 


Bedside Blood Glucose 116 mg/dl 7/30/19 1057


 


Bedside Blood Glucose 91 mg/dl 7/30/19 0444











Objective





Last 24 Hour Vital Signs








  Date Time  Temp Pulse Resp B/P (MAP) Pulse Ox O2 Delivery O2 Flow Rate FiO2


 


7/31/19 06:24  92  111/58    


 


7/31/19 06:00    111/58    


 


7/31/19 04:00  92      


 


7/31/19 04:00 97.4 98 18 111/58 (75) 95   


 


7/31/19 01:01  78 16  98 Nasal Cannula 2.0 28


 


7/31/19 00:54  75 18  96 Nasal Cannula 4.0 36


 


7/31/19 00:40  83  107/51    


 


7/31/19 00:00  83      


 


7/31/19 00:00 98.1 88 18 107/51 (69) 96   


 


7/30/19 21:42    113/57    


 


7/30/19 21:00      Nasal Cannula 2.0 


 


7/30/19 20:00 97.2 102 18 113/57 (75) 96   


 


7/30/19 20:00  101      


 


7/30/19 19:23  88 18  99 Nasal Cannula 2.0 28


 


7/30/19 19:22     96 Nasal Cannula 2.0 28


 


7/30/19 19:13  84 16  96 Nasal Cannula 2.0 28


 


7/30/19 17:00 98.4 108 14 104/59 (74) 95   


 


7/30/19 16:00      Venturi Mask 6.0 





      Venturi Mask  


 


7/30/19 16:00  113      


 


7/30/19 16:00  108 14 104/59 (74) 94   


 


7/30/19 16:00       4.0 40


 


7/30/19 15:00  111 20 117/61 (79) 94   


 


7/30/19 14:58  140  117/69    


 


7/30/19 14:55    117/69    


 


7/30/19 14:00  119 21 111/58 (75) 91   


 


7/30/19 13:00  114 19 97/68 (78) 92   


 


7/30/19 12:50  85 12  97 Nasal Cannula 2.0 28


 


7/30/19 12:42  89 14  96 Nasal Cannula 2.0 28


 


7/30/19 12:00       4.0 40


 


7/30/19 12:00  85      


 


7/30/19 12:00      Venturi Mask 6.0 





      Venturi Mask  


 


7/30/19 12:00 98.0 88 14 117/69 (85) 96   


 


7/30/19 11:00  93 12 136/66 (89) 89   


 


7/30/19 10:22  77  125/61    


 


7/30/19 10:00  91 11 125/59 (81) 95   


 


7/30/19 09:00  90 13 110/68 (82) 92   


 


7/30/19 08:00       4.0 40


 


7/30/19 08:00  88      


 


7/30/19 08:00      Venturi Mask 6.0 





      Venturi Mask  


 


7/30/19 08:00 98.4 90 18 125/56 (79) 93   

















Intake and Output  


 


 7/30/19 7/31/19





 19:00 07:00


 


Intake Total 550 ml 


 


Output Total 420 ml 


 


Balance 130 ml 


 


  


 


Intake Oral 450 ml 


 


IV Total 100 ml 


 


Output Urine Total 420 ml 








Laboratory Tests


7/30/19 08:10: 


Arterial Blood pH 7.417, Arterial Blood Partial Pressure CO2 49.9H, Arterial 

Blood Partial Pressure O2 66.4L, Arterial Blood HCO3 31.4H, Arterial Blood 

Oxygen Saturation 91.7L, Arterial Blood Base Excess 5.8H, [place] Test Positive


7/31/19 05:27: 


White Blood Count 10.8, Red Blood Count 3.94L, Hemoglobin 10.9L, Hematocrit 

35.7L, Mean Corpuscular Volume 91, Mean Corpuscular Hemoglobin 27.7, Mean 

Corpuscular Hemoglobin Concent 30.6L, Red Cell Distribution Width 15.6H, 

Platelet Count 480H, Mean Platelet Volume 5.7L, Neutrophils (%) (Auto) 81.1H, 

Lymphocytes (%) (Auto) 10.0L, Monocytes (%) (Auto) 8.1, Eosinophils (%) (Auto) 

0.3, Basophils (%) (Auto) 0.4, Sodium Level 139, Potassium Level 4.0, Chloride 

Level 98, Carbon Dioxide Level 37H, Anion Gap 4L, Blood Urea Nitrogen 55H, 

Creatinine 1.8H, Estimat Glomerular Filtration Rate , Glucose Level 296#H, 

Calcium Level 8.5, Total Bilirubin 0.3, Aspartate Amino Transf (AST/SGOT) 19, 

Alanine Aminotransferase (ALT/SGPT) 14, Alkaline Phosphatase 121H, Total 

Protein 6.4, Albumin 2.5L, Globulin 3.9, Albumin/Globulin Ratio 0.6L


Height (Feet):  5


Height (Inches):  4.00


Weight (Pounds):  177


General Appearance:  no apparent distress


Neck:  normal alignment


Cardiovascular:  normal rate


Respiratory/Chest:  decreased breath sounds


Abdomen:  normal bowel sounds


Pelvis:  normal external exam


Objective





Current Medications








 Medications


  (Trade)  Dose


 Ordered  Sig/Michele


 Route


 PRN Reason  Start Time


 Stop Time Status Last Admin


Dose Admin


 


 Acetaminophen


  (Tylenol)  650 mg  Q6H  PRN


 ORAL


 Mild Pain/Temp > 100.5  7/30/19 18:15


 8/26/19 18:14   


 


 


 Albuterol/


 Ipratropium


  (Albuterol/


 Ipratropium)  3 ml  Q6HRT


 HHN


   7/30/19 19:00


 7/31/19 08:59  7/31/19 07:16


 


 


 Amlodipine


 Besylate


  (Norvasc)  5 mg  DAILY


 ORAL


   7/31/19 09:00


 8/26/19 08:59   


 


 


 Apixaban


  (Eliquis)  5 mg  BID


 ORAL


   7/30/19 18:00


 8/25/19 08:59   


 


 


 Aspirin


  (ASA)  81 mg  DAILY


 ORAL


   7/31/19 09:00


 8/25/19 08:59   


 


 


 Atorvastatin


 Calcium


  (Lipitor)  80 mg  BEDTIME


 ORAL


   7/30/19 21:00


 8/25/19 20:59  7/30/19 21:42


 


 


 Ceftriaxone


 Sodium 1 gm/


 Dextrose  50 ml @ 


 100 mls/hr  Q24H


 IVPB


   7/31/19 17:00


 8/4/19 16:59   


 


 


 Clopidogrel


 Bisulfate


  (Plavix)  75 mg  DAILY


 ORAL


   7/31/19 09:00


 8/25/19 08:59   


 


 


 Dextrose


  (Dextrose 50%)  25 ml  Q30M  PRN


 IV


 Hypoglycemia  7/30/19 18:15


 8/29/19 07:14   


 


 


 Dextrose


  (Dextrose 50%)  50 ml  Q30M  PRN


 IV


 Hypoglycemia  7/30/19 18:15


 8/29/19 07:14   


 


 


 Hydralazine HCl


  (Apresoline)  50 mg  EVERY 8  HOURS


 ORAL


   7/30/19 22:00


 8/25/19 05:59  7/30/19 21:42


 


 


 Insulin Aspart


  (NovoLOG)    BEFORE MEALS AND  HS


 SUBQ


   7/30/19 21:00


 8/29/19 11:29  7/31/19 06:25


 


 


 Insulin Detemir


  (Levemir)  10 units  BID


 SUBQ


   7/30/19 18:00


 8/25/19 20:59   


 


 


 Lorazepam


  (Ativan 2mg/ml


 1ml)  1 mg  Q2H  PRN


 IV


 For Anxiety  7/30/19 19:15


 8/2/19 17:14   


 


 


 Methylprednisolone


 Sodium Succinate


  (Solu-MEDROL)  20 mg  DAILY


 IVP


   7/31/19 09:00


 8/27/19 08:59   


 


 


 Metoprolol


 Tartrate


  (Lopressor)  50 mg  Q6HR


 ORAL


   7/30/19 18:00


 8/26/19 18:29  7/31/19 06:24


 


 


 Pantoprazole


  (Protonix)  40 mg  DAILY


 IVP


   7/31/19 09:00


 8/25/19 20:44   


 

















Paolo Mendoza MD Jul 31, 2019 07:19

## 2019-08-01 VITALS — SYSTOLIC BLOOD PRESSURE: 133 MMHG | DIASTOLIC BLOOD PRESSURE: 68 MMHG

## 2019-08-01 VITALS — SYSTOLIC BLOOD PRESSURE: 144 MMHG | DIASTOLIC BLOOD PRESSURE: 69 MMHG

## 2019-08-01 VITALS — DIASTOLIC BLOOD PRESSURE: 55 MMHG | SYSTOLIC BLOOD PRESSURE: 105 MMHG

## 2019-08-01 VITALS — DIASTOLIC BLOOD PRESSURE: 57 MMHG | SYSTOLIC BLOOD PRESSURE: 109 MMHG

## 2019-08-01 VITALS — DIASTOLIC BLOOD PRESSURE: 69 MMHG | SYSTOLIC BLOOD PRESSURE: 142 MMHG

## 2019-08-01 VITALS — DIASTOLIC BLOOD PRESSURE: 67 MMHG | SYSTOLIC BLOOD PRESSURE: 134 MMHG

## 2019-08-01 LAB
ADD MANUAL DIFF: NO
ALBUMIN SERPL-MCNC: 2.9 G/DL (ref 3.4–5)
ALBUMIN/GLOB SERPL: 0.8 {RATIO} (ref 1–2.7)
ALP SERPL-CCNC: 142 U/L (ref 46–116)
ALT SERPL-CCNC: 16 U/L (ref 12–78)
ANION GAP SERPL CALC-SCNC: 2 MMOL/L (ref 5–15)
APPEARANCE UR: (no result)
APTT PPP: (no result) S
AST SERPL-CCNC: 17 U/L (ref 15–37)
BASOPHILS NFR BLD AUTO: 0.5 % (ref 0–2)
BILIRUB SERPL-MCNC: 0.2 MG/DL (ref 0.2–1)
BUN SERPL-MCNC: 51 MG/DL (ref 7–18)
CALCIUM SERPL-MCNC: 8.8 MG/DL (ref 8.5–10.1)
CHLORIDE SERPL-SCNC: 103 MMOL/L (ref 98–107)
CO2 SERPL-SCNC: 38 MMOL/L (ref 21–32)
CREAT SERPL-MCNC: 1.4 MG/DL (ref 0.55–1.3)
EOSINOPHIL NFR BLD AUTO: 0.7 % (ref 0–3)
ERYTHROCYTE [DISTWIDTH] IN BLOOD BY AUTOMATED COUNT: 16 % (ref 11.6–14.8)
GLOBULIN SER-MCNC: 3.7 G/DL
GLUCOSE UR STRIP-MCNC: (no result) MG/DL
HCT VFR BLD CALC: 40.8 % (ref 37–47)
HGB BLD-MCNC: 12.6 G/DL (ref 12–16)
KETONES UR QL STRIP: NEGATIVE
LEUKOCYTE ESTERASE UR QL STRIP: NEGATIVE
LYMPHOCYTES NFR BLD AUTO: 9.2 % (ref 20–45)
MCV RBC AUTO: 91 FL (ref 80–99)
MONOCYTES NFR BLD AUTO: 8.5 % (ref 1–10)
NEUTROPHILS NFR BLD AUTO: 81.1 % (ref 45–75)
NITRITE UR QL STRIP: NEGATIVE
PH UR STRIP: 5 [PH] (ref 4.5–8)
PLATELET # BLD: 496 K/UL (ref 150–450)
POTASSIUM SERPL-SCNC: 4.8 MMOL/L (ref 3.5–5.1)
PROT UR QL STRIP: NEGATIVE
RBC # BLD AUTO: 4.49 M/UL (ref 4.2–5.4)
SODIUM SERPL-SCNC: 143 MMOL/L (ref 136–145)
SP GR UR STRIP: 1.01 (ref 1–1.03)
UROBILINOGEN UR-MCNC: NORMAL MG/DL (ref 0–1)
WBC # BLD AUTO: 16.8 K/UL (ref 4.8–10.8)

## 2019-08-01 RX ADMIN — ATORVASTATIN CALCIUM SCH MG: 80 TABLET, FILM COATED ORAL at 21:00

## 2019-08-01 RX ADMIN — INSULIN ASPART SCH UNITS: 100 INJECTION, SOLUTION INTRAVENOUS; SUBCUTANEOUS at 21:03

## 2019-08-01 RX ADMIN — METFORMIN HYDROCHLORIDE SCH MG: 500 TABLET ORAL at 09:23

## 2019-08-01 RX ADMIN — INSULIN DETEMIR SCH UNITS: 100 INJECTION, SOLUTION SUBCUTANEOUS at 10:20

## 2019-08-01 RX ADMIN — INSULIN ASPART SCH UNITS: 100 INJECTION, SOLUTION INTRAVENOUS; SUBCUTANEOUS at 17:50

## 2019-08-01 RX ADMIN — METOPROLOL TARTRATE SCH MG: 50 TABLET, FILM COATED ORAL at 05:49

## 2019-08-01 RX ADMIN — HYDRALAZINE HYDROCHLORIDE SCH MG: 50 TABLET ORAL at 15:04

## 2019-08-01 RX ADMIN — SITAGLIPTIN SCH MG: 25 TABLET, FILM COATED ORAL at 05:53

## 2019-08-01 RX ADMIN — APIXABAN SCH MG: 5 TABLET, FILM COATED ORAL at 17:48

## 2019-08-01 RX ADMIN — HYDRALAZINE HYDROCHLORIDE SCH MG: 50 TABLET ORAL at 21:00

## 2019-08-01 RX ADMIN — INSULIN DETEMIR SCH UNITS: 100 INJECTION, SOLUTION SUBCUTANEOUS at 18:20

## 2019-08-01 RX ADMIN — ASPIRIN 81 MG SCH MG: 81 TABLET ORAL at 09:23

## 2019-08-01 RX ADMIN — HYDRALAZINE HYDROCHLORIDE SCH MG: 50 TABLET ORAL at 05:48

## 2019-08-01 RX ADMIN — METFORMIN HYDROCHLORIDE SCH MG: 500 TABLET ORAL at 17:48

## 2019-08-01 RX ADMIN — INSULIN ASPART SCH UNITS: 100 INJECTION, SOLUTION INTRAVENOUS; SUBCUTANEOUS at 05:40

## 2019-08-01 RX ADMIN — INSULIN ASPART SCH UNITS: 100 INJECTION, SOLUTION INTRAVENOUS; SUBCUTANEOUS at 12:45

## 2019-08-01 RX ADMIN — ACETAMINOPHEN PRN MG: 160 SOLUTION ORAL at 18:24

## 2019-08-01 RX ADMIN — APIXABAN SCH MG: 5 TABLET, FILM COATED ORAL at 09:23

## 2019-08-01 RX ADMIN — METOPROLOL TARTRATE SCH MG: 50 TABLET, FILM COATED ORAL at 17:57

## 2019-08-01 RX ADMIN — METOPROLOL TARTRATE SCH MG: 50 TABLET, FILM COATED ORAL at 12:43

## 2019-08-01 RX ADMIN — SODIUM CHLORIDE SCH MLS/HR: 0.9 INJECTION INTRAVENOUS at 09:21

## 2019-08-01 RX ADMIN — METOPROLOL TARTRATE SCH MG: 50 TABLET, FILM COATED ORAL at 00:13

## 2019-08-01 NOTE — NUR
HAND-OFF: 



Report given to GEN Live. Patient is awake lying semi-peters's; resting comfortably. In 
stable condition.

## 2019-08-01 NOTE — NUR
NURSE NOTES:  Received patient from Maria T BLEVINS.  Patient in bed, on 2L NC, oriented x2.  
Patient appears restless.  Does not know where she is or why.  Patient unable to describe or 
locate pain, but c/o pain 10/10.  Renee catheter intact.  PIV on left wrist intact, patent, 
saline locked.

## 2019-08-01 NOTE — NUR
NURSE NOTES:



Patient is awake sitting chairfast; resting comfortably. No signs of acute distress noted; 
denies pain at this time. On 2L nasal cannula. Renee catheter draining well to gravity.

## 2019-08-01 NOTE — INFECTIOUS DISEASES PROG NOTE
Assessment/Plan


Assessment/Plan





ASSESSMENT AND PLAN:





1. klebsiella uti, sepsis, chf, respiratory failure leukocytosis, fevers, 

recent iv steroids 





   - rocephin - day # 5 abx


   - extubated


   - monitor labs and chest x-ray 


   - recurrent leukocytosis likely secondary to steroids - recheck ua/culture 





2. Acute renal failure.


3. Anemia.


4. CHF.


5. Respiratory failure.


6. ICU care.


7. Diabetes.


8. Possible hypertension.


9. Blood sugar treatment per Endocrinology and primary.


10. Vent care per Dr. Alva and Dr. Main.


11. Falls.


12. Sick sinus syndrome, pacemaker.


13. Sleep apnea, COPD.


14. Anemia.


15. Diastolic heart disease.


16. Coronary artery disease.


17. CHF.


18. Dyslipidemia.


19. Neuropathy.


20. Obesity.


21. Vitamin D deficiency.


22. Allergies to sulfa drugs.


23. MAR is noted.


24. Case was discussed with RN.


25. Social history negative.


26. Family history noncontributory.


27. Continue treatment per primary consultants.





Subjective


Constitutional:  Denies: fever


HEENT:  Reports: congestion - mild


Respiratory:  Reports: shortness of breath - mild


Cardiovascular:  Denies: chest pain


Gastrointestinal/Abdominal:  Denies: nausea, vomiting, diarrhea


Genitourinary:  Reports: other - + zaidi


Neurologic:  Denies: headache


Psychiatric:  Denies: depression


Skin:  Denies: rash


Hematologic:  Denies: bleeding


Musculoskeletal:  Denies: pain


Allergies:  


Coded Allergies:  


     SULFA (SULFONAMIDE ANTIBIOTICS) (Unverified  Allergy, Unknown, 7/26/19)


Uncoded Allergies:  


     SULFA (Allergy, Unknown, 7/25/19)





Objective


Vital Signs





Last 24 Hour Vital Signs








  Date Time  Temp Pulse Resp B/P (MAP) Pulse Ox O2 Delivery O2 Flow Rate FiO2


 


8/1/19 15:04    124/73    


 


8/1/19 12:43  81  142/69    


 


8/1/19 12:00 98.6 81 18 142/69 (93) 94   


 


8/1/19 11:50  76      


 


8/1/19 09:22  85  134/67    


 


8/1/19 09:00      Nasal Cannula 2.0 





      Nasal Cannula 2.0 





      Nasal Cannula 2.0 


 


8/1/19 08:00 96.1 85 18 134/67 (89) 97   


 


8/1/19 07:39  75      


 


8/1/19 05:49  87  147/61    


 


8/1/19 05:48    147/61    


 


8/1/19 04:00 98.1 70 16 144/69 (94) 95   


 


8/1/19 04:00  71      


 


8/1/19 00:13  86  109/57    


 


8/1/19 00:00  86      


 


8/1/19 00:00 97.9 76 18 109/57 (74) 97   


 


7/31/19 21:00      Nasal Cannula 2.0 





      Nasal Cannula 2.0 


 


7/31/19 20:55    111/64    


 


7/31/19 20:00  93      


 


7/31/19 20:00 97.9 82 18 111/64 (80) 97   


 


7/31/19 18:17  84  118/63    








Height (Feet):  5


Height (Inches):  4.00


Weight (Pounds):  182


General Appearance:  no acute distress


HEENT:  normocephalic, atraumatic, anicteric, mucous membranes moist


Respiratory/Chest:  crackles/rales, rhonchi - bilaterally


Cardiovascular:  normal rate, regular rhythm, no gallop/murmur, no JVD


Abdomen:  normal bowel sounds, soft, non tender, no organomegaly, non distended


Genitourinary:  other - + zaidi - urine slt cloudy


Extremities:  no cyanosis


Skin:  no rash


Neurologic/Psychiatric:  CNs II-XII grossly normal, alert, responsive


Lymphatic:  no neck adenopathy


Musculoskeletal:  no effusion


Objective





Chest x-ray - 7/30/19 - 





Comparison:  7/28/2019


 


A single view chest radiograph was obtained.


 


Findings:


 


Pulmonary vascular congestion suspected. Lung volumes are low. Patient has been


extubated. The left lung base is obscured. Pacemaker is noted. Bones are 

osteopenic.


 


IMPRESSION:


 


Suspected pulmonary vascular congestion





Microbiology








 Date/Time


Source Procedure


Growth Status


 


 


 7/28/19 09:15


Blood Blood Culture - Preliminary


NO GROWTH AFTER 72 HOURS Resulted





 7/28/19 10:30


Sputum Induced Gram Stain - Final Complete


 


 7/28/19 10:30


Sputum Induced Sputum Culture - Final


NORMAL UPPER RESPIRATORY PEYTON PRESENT Complete


 


 7/28/19 10:30


Indwelling Cath Urine Culture - Final


NO GROWTH AFTER 48 HOURS Complete


 


 7/26/19 04:30


Rectum - Final


NO CARBAPENEM-RESISTANT ENTEROBACTERI... Complete











Laboratory Tests








Test


  8/1/19


05:38


 


White Blood Count


  16.8 K/UL


(4.8-10.8)  #H


 


Red Blood Count


  4.49 M/UL


(4.20-5.40)


 


Hemoglobin


  12.6 G/DL


(12.0-16.0)


 


Hematocrit


  40.8 %


(37.0-47.0)


 


Mean Corpuscular Volume 91 FL (80-99)  


 


Mean Corpuscular Hemoglobin


  28.0 PG


(27.0-31.0)


 


Mean Corpuscular Hemoglobin


Concent 30.8 G/DL


(32.0-36.0)  L


 


Red Cell Distribution Width


  16.0 %


(11.6-14.8)  H


 


Platelet Count


  496 K/UL


(150-450)  H


 


Mean Platelet Volume


  5.9 FL


(6.5-10.1)  L


 


Neutrophils (%) (Auto)


  81.1 %


(45.0-75.0)  H


 


Lymphocytes (%) (Auto)


  9.2 %


(20.0-45.0)  L


 


Monocytes (%) (Auto)


  8.5 %


(1.0-10.0)


 


Eosinophils (%) (Auto)


  0.7 %


(0.0-3.0)


 


Basophils (%) (Auto)


  0.5 %


(0.0-2.0)


 


Sodium Level


  143 MMOL/L


(136-145)


 


Potassium Level


  4.8 MMOL/L


(3.5-5.1)


 


Chloride Level


  103 MMOL/L


()


 


Carbon Dioxide Level


  38 MMOL/L


(21-32)  H


 


Anion Gap


  2 mmol/L


(5-15)  L


 


Blood Urea Nitrogen


  51 mg/dL


(7-18)  H


 


Creatinine


  1.4 MG/DL


(0.55-1.30)  H


 


Estimat Glomerular Filtration


Rate  mL/min (>60)  


 


 


Glucose Level


  176 MG/DL


()  #H


 


Calcium Level


  8.8 MG/DL


(8.5-10.1)


 


Total Bilirubin


  0.2 MG/DL


(0.2-1.0)


 


Aspartate Amino Transf


(AST/SGOT) 17 U/L (15-37)


 


 


Alanine Aminotransferase


(ALT/SGPT) 16 U/L (12-78)


 


 


Alkaline Phosphatase


  142 U/L


()  H


 


Total Protein


  6.6 G/DL


(6.4-8.2)


 


Albumin


  2.9 G/DL


(3.4-5.0)  L


 


Globulin 3.7 g/dL  


 


Albumin/Globulin Ratio


  0.8 (1.0-2.7)


L











Current Medications








 Medications


  (Trade)  Dose


 Ordered  Sig/Michele


 Route


 PRN Reason  Start Time


 Stop Time Status Last Admin


Dose Admin


 


 Acetaminophen


  (Tylenol)  650 mg  Q6H  PRN


 ORAL


 Mild Pain/Temp > 100.5  7/30/19 18:15


 8/26/19 18:14   


 


 


 Amlodipine


 Besylate


  (Norvasc)  5 mg  DAILY


 ORAL


   7/31/19 09:00


 8/26/19 08:59  8/1/19 09:22


 


 


 Apixaban


  (Eliquis)  5 mg  BID


 ORAL


   7/30/19 18:00


 8/25/19 08:59  8/1/19 09:23


 


 


 Aspirin


  (ASA)  81 mg  DAILY


 ORAL


   7/31/19 09:00


 8/25/19 08:59  8/1/19 09:23


 


 


 Atorvastatin


 Calcium


  (Lipitor)  80 mg  BEDTIME


 ORAL


   7/30/19 21:00


 8/25/19 20:59  7/31/19 20:54


 


 


 Ceftriaxone


 Sodium 1 gm/


 Sodium Chloride  55 ml @ 


 110 mls/hr  DAILY


 IVPB


   7/31/19 12:00


 8/4/19 23:00  8/1/19 09:21


 


 


 Clopidogrel


 Bisulfate


  (Plavix)  75 mg  DAILY


 ORAL


   7/31/19 09:00


 8/25/19 08:59  8/1/19 09:23


 


 


 Dextrose


  (Dextrose 50%)  25 ml  Q30M  PRN


 IV


 Hypoglycemia  7/30/19 18:15


 8/29/19 07:14   


 


 


 Dextrose


  (Dextrose 50%)  50 ml  Q30M  PRN


 IV


 Hypoglycemia  7/30/19 18:15


 8/29/19 07:14   


 


 


 Furosemide


  (Lasix)  40 mg  DAILY


 IV


   8/1/19 13:00


 8/31/19 12:59  8/1/19 13:52


 


 


 Hydralazine HCl


  (Apresoline)  50 mg  EVERY 8  HOURS


 ORAL


   7/30/19 22:00


 8/25/19 05:59  8/1/19 15:04


 


 


 Insulin Aspart


  (NovoLOG)    BEFORE MEALS AND  HS


 SUBQ


   7/30/19 21:00


 8/29/19 11:29  8/1/19 12:45


 


 


 Insulin Detemir


  (Levemir)  12 units  BID


 SUBQ


   7/31/19 09:00


 8/25/19 20:59  8/1/19 10:20


 


 


 Metformin HCl


  (Glucophage)  500 mg  BID


 ORAL


   8/1/19 09:00


 8/31/19 08:59  8/1/19 09:23


 


 


 Metoprolol


 Tartrate


  (Lopressor)  50 mg  Q6HR


 ORAL


   7/30/19 18:00


 8/26/19 18:29  8/1/19 12:43


 


 


 Nateglinide


  (Starlix)  120 mg  TIAC


 ORAL


   7/31/19 11:30


 8/30/19 11:29  8/1/19 12:39


 


 


 Pantoprazole


  (Protonix)  40 mg  ACBREAKFAST


 ORAL


   8/1/19 06:30


 8/31/19 06:29  8/1/19 05:53


 


 


 Prednisone


  (predniSONE)  10 mg  DAILY


 ORAL


   8/1/19 09:00


 8/31/19 08:59  8/1/19 09:23


 


 


 Sitagliptin


 Phosphate


  (Januvia)  25 mg  ACBREAKFAST


 ORAL


   8/1/19 06:30


 8/31/19 06:29  8/1/19 05:53


 

















Eldon Gould MD Aug 1, 2019 16:24

## 2019-08-01 NOTE — NUR
NURSE NOTES:

Called respiratory tech to let them know to bring BIPap for pain she needs to use it prn and 
at night.

## 2019-08-01 NOTE — NUR
NURSE NOTES:

Daughter called and states that her mother has fallen several times at home this past week. 
will notify the Md... Pt is currently week and reporting dizziness.

## 2019-08-01 NOTE — PULMONOLOGY PROGRESS NOTE
Assessment/Plan


Assessment/Plan


1. Congestive heart failure.


2. Diabetes, out of control.


3. Altered mental status due to CO2 narcosis


4. COPD with resp failure


5. Sleep apnea.


6. Atrial fibrillation, not on anticoagulants.


7. Coronary heart disease.


8. Aortic atherosclerosis.


9. Old cerebral infarct





resp failure, now with asterixis


check ABG


steroid PO


AC per cardiology


disc w son





Subjective


ROS Limited/Unobtainable:  Yes


Allergies:  


Coded Allergies:  


     SULFA (SULFONAMIDE ANTIBIOTICS) (Unverified  Allergy, Unknown, 7/26/19)


Uncoded Allergies:  


     SULFA (Allergy, Unknown, 7/25/19)





Objective





Last 24 Hour Vital Signs








  Date Time  Temp Pulse Resp B/P (MAP) Pulse Ox O2 Delivery O2 Flow Rate FiO2


 


8/1/19 15:04    124/73    


 


8/1/19 12:43  81  142/69    


 


8/1/19 12:00 98.6 81 18 142/69 (93) 94   


 


8/1/19 11:50  76      


 


8/1/19 09:22  85  134/67    


 


8/1/19 09:00      Nasal Cannula 2.0 





      Nasal Cannula 2.0 





      Nasal Cannula 2.0 


 


8/1/19 08:00 96.1 85 18 134/67 (89) 97   


 


8/1/19 07:39  75      


 


8/1/19 05:49  87  147/61    


 


8/1/19 05:48    147/61    


 


8/1/19 04:00 98.1 70 16 144/69 (94) 95   


 


8/1/19 04:00  71      


 


8/1/19 00:13  86  109/57    


 


8/1/19 00:00  86      


 


8/1/19 00:00 97.9 76 18 109/57 (74) 97   


 


7/31/19 21:00      Nasal Cannula 2.0 





      Nasal Cannula 2.0 


 


7/31/19 20:55    111/64    


 


7/31/19 20:00  93      


 


7/31/19 20:00 97.9 82 18 111/64 (80) 97   


 


7/31/19 18:17  84  118/63    

















Intake and Output  


 


 7/31/19 8/1/19





 19:00 07:00


 


Intake Total 260 ml 240 ml


 


Output Total 820 ml 1750 ml


 


Balance -560 ml -1510 ml


 


  


 


Intake Oral 260 ml 240 ml


 


Output Urine Total 820 ml 1750 ml








General Appearance:  no acute distress


HEENT:  atraumatic


Respiratory/Chest:  lungs clear, decreased breath sounds


Cardiovascular:  normal rate


Neurologic/Psychiatric:  other - asterixis


Laboratory Tests


8/1/19 05:38: 


White Blood Count 16.8#H, Red Blood Count 4.49, Hemoglobin 12.6, Hematocrit 40.8

, Mean Corpuscular Volume 91, Mean Corpuscular Hemoglobin 28.0, Mean 

Corpuscular Hemoglobin Concent 30.8L, Red Cell Distribution Width 16.0H, 

Platelet Count 496H, Mean Platelet Volume 5.9L, Neutrophils (%) (Auto) 81.1H, 

Lymphocytes (%) (Auto) 9.2L, Monocytes (%) (Auto) 8.5, Eosinophils (%) (Auto) 

0.7, Basophils (%) (Auto) 0.5, Sodium Level 143, Potassium Level 4.8, Chloride 

Level 103, Carbon Dioxide Level 38H, Anion Gap 2L, Blood Urea Nitrogen 51H, 

Creatinine 1.4H, Estimat Glomerular Filtration Rate , Glucose Level 176#H, 

Calcium Level 8.8, Total Bilirubin 0.2, Aspartate Amino Transf (AST/SGOT) 17, 

Alanine Aminotransferase (ALT/SGPT) 16, Alkaline Phosphatase 142H, Total 

Protein 6.6, Albumin 2.9L, Globulin 3.7, Albumin/Globulin Ratio 0.8L





Current Medications








 Medications


  (Trade)  Dose


 Ordered  Sig/Michele


 Route


 PRN Reason  Start Time


 Stop Time Status Last Admin


Dose Admin


 


 Acetaminophen


  (Tylenol)  650 mg  Q6H  PRN


 ORAL


 Mild Pain/Temp > 100.5  7/30/19 18:15


 8/26/19 18:14   


 


 


 Amlodipine


 Besylate


  (Norvasc)  5 mg  DAILY


 ORAL


   7/31/19 09:00


 8/26/19 08:59  8/1/19 09:22


 


 


 Apixaban


  (Eliquis)  5 mg  BID


 ORAL


   7/30/19 18:00


 8/25/19 08:59  8/1/19 09:23


 


 


 Aspirin


  (ASA)  81 mg  DAILY


 ORAL


   7/31/19 09:00


 8/25/19 08:59  8/1/19 09:23


 


 


 Atorvastatin


 Calcium


  (Lipitor)  80 mg  BEDTIME


 ORAL


   7/30/19 21:00


 8/25/19 20:59  7/31/19 20:54


 


 


 Ceftriaxone


 Sodium 1 gm/


 Sodium Chloride  55 ml @ 


 110 mls/hr  DAILY


 IVPB


   7/31/19 12:00


 8/4/19 23:00  8/1/19 09:21


 


 


 Clopidogrel


 Bisulfate


  (Plavix)  75 mg  DAILY


 ORAL


   7/31/19 09:00


 8/25/19 08:59  8/1/19 09:23


 


 


 Dextrose


  (Dextrose 50%)  25 ml  Q30M  PRN


 IV


 Hypoglycemia  7/30/19 18:15


 8/29/19 07:14   


 


 


 Dextrose


  (Dextrose 50%)  50 ml  Q30M  PRN


 IV


 Hypoglycemia  7/30/19 18:15


 8/29/19 07:14   


 


 


 Furosemide


  (Lasix)  40 mg  DAILY


 IV


   8/1/19 13:00


 8/31/19 12:59  8/1/19 13:52


 


 


 Hydralazine HCl


  (Apresoline)  50 mg  EVERY 8  HOURS


 ORAL


   7/30/19 22:00


 8/25/19 05:59  8/1/19 15:04


 


 


 Insulin Aspart


  (NovoLOG)    BEFORE MEALS AND  HS


 SUBQ


   7/30/19 21:00


 8/29/19 11:29  8/1/19 12:45


 


 


 Insulin Detemir


  (Levemir)  12 units  BID


 SUBQ


   7/31/19 09:00


 8/25/19 20:59  8/1/19 10:20


 


 


 Metformin HCl


  (Glucophage)  500 mg  BID


 ORAL


   8/1/19 09:00


 8/31/19 08:59  8/1/19 09:23


 


 


 Metoprolol


 Tartrate


  (Lopressor)  50 mg  Q6HR


 ORAL


   7/30/19 18:00


 8/26/19 18:29  8/1/19 12:43


 


 


 Nateglinide


  (Starlix)  120 mg  TIAC


 ORAL


   7/31/19 11:30


 8/30/19 11:29  8/1/19 12:39


 


 


 Pantoprazole


  (Protonix)  40 mg  ACBREAKFAST


 ORAL


   8/1/19 06:30


 8/31/19 06:29  8/1/19 05:53


 


 


 Prednisone


  (predniSONE)  10 mg  DAILY


 ORAL


   8/1/19 09:00


 8/31/19 08:59  8/1/19 09:23


 


 


 Sitagliptin


 Phosphate


  (Januvia)  25 mg  ACBREAKFAST


 ORAL


   8/1/19 06:30


 8/31/19 06:29  8/1/19 05:53


 

















Yakov Alva MD Aug 1, 2019 17:34

## 2019-08-01 NOTE — NUR
NURSE NOTES:  Notified Dr. Alva of patient's complaints of pain, no BM since 7/28.  
Received orders for MOM and Colace for constipation, and tramadol for pain.

## 2019-08-01 NOTE — NUR
SI:    CHF

T. 96.1 HR 83 RR 18 B/P 147/61

WBC 16.8 CO2 38 BUN 51 CR 1.4 ALK PHOS 142







IS:    CEFTRIAXONE IV

PREDNISONE PO

METFORMIN PO

PROTONIX PO

*******TELE STATUS*****

## 2019-08-01 NOTE — NUR
*-* INSURANCE *-*



ALL CLINICALS AND REVIEWS HAVE BEEN FAXED TO:



Formerly [facility]

P: [phone]

F: [phone] (FAX CLINICALS)

## 2019-08-01 NOTE — PROGRESS NOTE
DATE:  07/31/2019

CARDIOLOGY PROGRESS NOTE



SUBJECTIVE:  The patient is out of the intensive care unit.  No respiratory

distress.  She continues on antimicrobials.  She remains on full

anticoagulation for cardioembolic prophylaxis.



OBJECTIVE:

VITAL SIGNS:  Blood pressure 109/57, pulse 76, and respiratory rate 18.

NECK:  Jugular venous pressure is slightly elevated.

LUNGS:  With few rales.

CARDIAC:  Regular rhythm and rate.  Normal S1 and S2.  A 1/6 systolic

murmur at apex.

ABDOMEN:  Soft.

EXTREMITIES:  Trace edema.



IMPRESSION:

1. Acute on chronic diastolic congestive heart failure.

2. Status post respiratory failure.

3. Chronic obstructive pulmonary disease exacerbation.

4. Paroxysmal atrial fibrillation.

5. ____ history of CVA.



PLAN:

1. Additional diuresis.

2. Maintain anticoagulation and anti-platelet therapy.

3. Insulin titration.

4. Bleeding precautions.

5. Beta-blockade.









  ______________________________________________

  Keon Coker M.D.





DR:  GREGORIO

D:  08/01/2019 02:15

T:  08/01/2019 02:39

JOB#:  [id number]

CC:

## 2019-08-01 NOTE — NUR
NURSE NOTES:

Reported most recent blood gases to doctor. per T/O from Doctor Nida pt to be on Bipap @ 
night 12/5.  Dr is also aware of increase of (16.8 on WBC).

## 2019-08-01 NOTE — NUR
pt. awake in bed resting calmly. Pt Aox2 .   Pt on 2L oxygen on cardiac monitor no signs of 
cardiac or respiratory distress.  IV site intact and patent.  Bed is locked and in lowest 
position.  Call light within reach.  Will continue to follow plan of care.

## 2019-08-01 NOTE — NUR
HAND-OFF: 

Report given to Gagan/GEN. Pt complaining off an on from generalized pain.  Bipap ordered and 
will be brought to pt. sherif.

## 2019-08-01 NOTE — GENERAL PROGRESS NOTE
Assessment/Plan


Problem List:  


(1) Bacteriuria


ICD Codes:  R82.71 - Bacteriuria


SNOMED:  66677292


(2) Uncontrolled diabetes mellitus


ICD Codes:  E11.65 - Type 2 diabetes mellitus with hyperglycemia


SNOMED:  82932997, 720955410


Qualifiers:  


   Qualified Codes:  E11.65 - Type 2 diabetes mellitus with hyperglycemia


(3) Acute exacerbation of CHF (congestive heart failure)


ICD Codes:  I50.9 - Heart failure, unspecified


SNOMED:  755028614, [id number]


Qualifiers:  


   Qualified Codes:  I50.9 - Heart failure, unspecified


Assessment/Plan:


continue Levemir 12 units bid 


continue Januvia 25 mg daily 


continue Starlix 120 mg ac tid 


start Metformin 500 mg bid - Cr improved


continue NISS ac / hs





Subjective


Allergies:  


Coded Allergies:  


     SULFA (SULFONAMIDE ANTIBIOTICS) (Unverified  Allergy, Unknown, 7/26/19)


Uncoded Allergies:  


     SULFA (Allergy, Unknown, 7/25/19)


All Systems:  reviewed and negative except above


Subjective


events noted 


mealtime glucose elevated due to steroid effect 


IVSM changed to Prednisone 











Item Value  Date Time


 


Bedside Blood Glucose 150 mg/dl H 8/1/19 0630


 


Bedside Blood Glucose 340 mg/dl H 7/31/19 1819


 


Bedside Blood Glucose 387 mg/dl H 7/31/19 1149


 


Bedside Blood Glucose 327 mg/dl H 7/31/19 0907


 


Bedside Blood Glucose 257 mg/dl H 7/31/19 0630











Objective





Last 24 Hour Vital Signs








  Date Time  Temp Pulse Resp B/P (MAP) Pulse Ox O2 Delivery O2 Flow Rate FiO2


 


8/1/19 05:49  87  147/61    


 


8/1/19 05:48    147/61    


 


8/1/19 04:00 98.1 70 16 144/69 (94) 95   


 


8/1/19 04:00  71      


 


8/1/19 00:13  86  109/57    


 


8/1/19 00:00  86      


 


8/1/19 00:00 97.9 76 18 109/57 (74) 97   


 


7/31/19 21:00      Nasal Cannula 2.0 





      Nasal Cannula 2.0 


 


7/31/19 20:55    111/64    


 


7/31/19 20:00  93      


 


7/31/19 20:00 97.9 82 18 111/64 (80) 97   


 


7/31/19 18:17  84  118/63    


 


7/31/19 16:00  84      


 


7/31/19 16:00 98.2 84 20 118/63 (81) 94   


 


7/31/19 14:00    109/65    


 


7/31/19 12:00 97.7 93 20 119/59 (79) 95   


 


7/31/19 11:57  97      


 


7/31/19 11:45  104  119/59    


 


7/31/19 09:00      Nasal Cannula 2.0 





      Nasal Cannula 2.0 


 


7/31/19 08:12  86  118/74    


 


7/31/19 08:00 97.6 86 20 118/74 (89) 96   


 


7/31/19 07:45  94      


 


7/31/19 07:26  73 17  98 Nasal Cannula 2.0 28


 


7/31/19 07:16     96 Nasal Cannula 2.0 28


 


7/31/19 07:16  70 18  96 Nasal Cannula 2.0 28

















Intake and Output  


 


 7/31/19 8/1/19





 19:00 07:00


 


Intake Total 260 ml 240 ml


 


Output Total 820 ml 1750 ml


 


Balance -560 ml -1510 ml


 


  


 


Intake Oral 260 ml 240 ml


 


Output Urine Total 820 ml 1750 ml








Laboratory Tests


8/1/19 05:38: 


White Blood Count 16.8#H, Red Blood Count 4.49, Hemoglobin 12.6, Hematocrit 40.8

, Mean Corpuscular Volume 91, Mean Corpuscular Hemoglobin 28.0, Mean 

Corpuscular Hemoglobin Concent 30.8L, Red Cell Distribution Width 16.0H, 

Platelet Count 496H, Mean Platelet Volume 5.9L, Neutrophils (%) (Auto) 81.1H, 

Lymphocytes (%) (Auto) 9.2L, Monocytes (%) (Auto) 8.5, Eosinophils (%) (Auto) 

0.7, Basophils (%) (Auto) 0.5, Sodium Level 143, Potassium Level 4.8, Chloride 

Level 103, Carbon Dioxide Level 38H, Anion Gap 2L, Blood Urea Nitrogen 51H, 

Creatinine 1.4H, Estimat Glomerular Filtration Rate , Glucose Level 176#H, 

Calcium Level 8.8, Total Bilirubin 0.2, Aspartate Amino Transf (AST/SGOT) 17, 

Alanine Aminotransferase (ALT/SGPT) 16, Alkaline Phosphatase 142H, Total 

Protein 6.6, Albumin 2.9L, Globulin 3.7, Albumin/Globulin Ratio 0.8L


Height (Feet):  5


Height (Inches):  4.00


Weight (Pounds):  182


General Appearance:  no apparent distress


Neck:  normal alignment


Cardiovascular:  normal rate


Respiratory/Chest:  decreased breath sounds


Abdomen:  normal bowel sounds


Pelvis:  normal external exam


Objective





Current Medications








 Medications


  (Trade)  Dose


 Ordered  Sig/Michele


 Route


 PRN Reason  Start Time


 Stop Time Status Last Admin


Dose Admin


 


 Acetaminophen


  (Tylenol)  650 mg  Q6H  PRN


 ORAL


 Mild Pain/Temp > 100.5  7/30/19 18:15


 8/26/19 18:14   


 


 


 Amlodipine


 Besylate


  (Norvasc)  5 mg  DAILY


 ORAL


   7/31/19 09:00


 8/26/19 08:59  7/31/19 08:12


 


 


 Apixaban


  (Eliquis)  5 mg  BID


 ORAL


   7/30/19 18:00


 8/25/19 08:59  7/31/19 18:17


 


 


 Aspirin


  (ASA)  81 mg  DAILY


 ORAL


   7/31/19 09:00


 8/25/19 08:59  7/31/19 08:13


 


 


 Atorvastatin


 Calcium


  (Lipitor)  80 mg  BEDTIME


 ORAL


   7/30/19 21:00


 8/25/19 20:59  7/31/19 20:54


 


 


 Ceftriaxone


 Sodium 1 gm/


 Sodium Chloride  55 ml @ 


 110 mls/hr  DAILY


 IVPB


   7/31/19 12:00


 8/4/19 23:00  7/31/19 12:38


 


 


 Clopidogrel


 Bisulfate


  (Plavix)  75 mg  DAILY


 ORAL


   7/31/19 09:00


 8/25/19 08:59  7/31/19 08:12


 


 


 Dextrose


  (Dextrose 50%)  25 ml  Q30M  PRN


 IV


 Hypoglycemia  7/30/19 18:15


 8/29/19 07:14   


 


 


 Dextrose


  (Dextrose 50%)  50 ml  Q30M  PRN


 IV


 Hypoglycemia  7/30/19 18:15


 8/29/19 07:14   


 


 


 Hydralazine HCl


  (Apresoline)  50 mg  EVERY 8  HOURS


 ORAL


   7/30/19 22:00


 8/25/19 05:59  8/1/19 05:48


 


 


 Insulin Aspart


  (NovoLOG)    BEFORE MEALS AND  HS


 SUBQ


   7/30/19 21:00


 8/29/19 11:29  8/1/19 05:40


 


 


 Insulin Detemir


  (Levemir)  12 units  BID


 SUBQ


   7/31/19 09:00


 8/25/19 20:59  7/31/19 18:19


 


 


 Metoprolol


 Tartrate


  (Lopressor)  50 mg  Q6HR


 ORAL


   7/30/19 18:00


 8/26/19 18:29  8/1/19 05:49


 


 


 Nateglinide


  (Starlix)  120 mg  TIAC


 ORAL


   7/31/19 11:30


 8/30/19 11:29  8/1/19 05:49


 


 


 Pantoprazole


  (Protonix)  40 mg  ACBREAKFAST


 ORAL


   8/1/19 06:30


 8/31/19 06:29  8/1/19 05:53


 


 


 Prednisone


  (predniSONE)  10 mg  DAILY


 ORAL


   8/1/19 09:00


 8/31/19 08:59   


 


 


 Sitagliptin


 Phosphate


  (Januvia)  25 mg  ACBREAKFAST


 ORAL


   8/1/19 06:30


 8/31/19 06:29  8/1/19 05:53


 

















Paolo Mendoza MD Aug 1, 2019 07:10 <5 years

## 2019-08-02 VITALS — DIASTOLIC BLOOD PRESSURE: 74 MMHG | SYSTOLIC BLOOD PRESSURE: 114 MMHG

## 2019-08-02 VITALS — DIASTOLIC BLOOD PRESSURE: 71 MMHG | SYSTOLIC BLOOD PRESSURE: 134 MMHG

## 2019-08-02 VITALS — SYSTOLIC BLOOD PRESSURE: 134 MMHG | DIASTOLIC BLOOD PRESSURE: 64 MMHG

## 2019-08-02 VITALS — DIASTOLIC BLOOD PRESSURE: 70 MMHG | SYSTOLIC BLOOD PRESSURE: 114 MMHG

## 2019-08-02 VITALS — DIASTOLIC BLOOD PRESSURE: 52 MMHG | SYSTOLIC BLOOD PRESSURE: 121 MMHG

## 2019-08-02 VITALS — SYSTOLIC BLOOD PRESSURE: 142 MMHG | DIASTOLIC BLOOD PRESSURE: 57 MMHG

## 2019-08-02 VITALS — DIASTOLIC BLOOD PRESSURE: 75 MMHG | SYSTOLIC BLOOD PRESSURE: 118 MMHG

## 2019-08-02 VITALS — SYSTOLIC BLOOD PRESSURE: 122 MMHG | DIASTOLIC BLOOD PRESSURE: 64 MMHG

## 2019-08-02 LAB
ADD MANUAL DIFF: NO
ALBUMIN SERPL-MCNC: 2.5 G/DL (ref 3.4–5)
ALBUMIN/GLOB SERPL: 0.6 {RATIO} (ref 1–2.7)
ALP SERPL-CCNC: 109 U/L (ref 46–116)
ALT SERPL-CCNC: 13 U/L (ref 12–78)
ANION GAP SERPL CALC-SCNC: 1 MMOL/L (ref 5–15)
AST SERPL-CCNC: 15 U/L (ref 15–37)
BASOPHILS NFR BLD AUTO: 0.6 % (ref 0–2)
BILIRUB SERPL-MCNC: 0.3 MG/DL (ref 0.2–1)
BUN SERPL-MCNC: 42 MG/DL (ref 7–18)
CALCIUM SERPL-MCNC: 8.7 MG/DL (ref 8.5–10.1)
CHLORIDE SERPL-SCNC: 104 MMOL/L (ref 98–107)
CO2 SERPL-SCNC: 35 MMOL/L (ref 21–32)
CREAT SERPL-MCNC: 1.3 MG/DL (ref 0.55–1.3)
EOSINOPHIL NFR BLD AUTO: 1 % (ref 0–3)
ERYTHROCYTE [DISTWIDTH] IN BLOOD BY AUTOMATED COUNT: 16.6 % (ref 11.6–14.8)
GLOBULIN SER-MCNC: 3.9 G/DL
HCT VFR BLD CALC: 37.6 % (ref 37–47)
HGB BLD-MCNC: 11.4 G/DL (ref 12–16)
LYMPHOCYTES NFR BLD AUTO: 9.2 % (ref 20–45)
MCV RBC AUTO: 91 FL (ref 80–99)
MONOCYTES NFR BLD AUTO: 8.4 % (ref 1–10)
NEUTROPHILS NFR BLD AUTO: 80.8 % (ref 45–75)
PLATELET # BLD: 472 K/UL (ref 150–450)
POTASSIUM SERPL-SCNC: 4.6 MMOL/L (ref 3.5–5.1)
RBC # BLD AUTO: 4.13 M/UL (ref 4.2–5.4)
SODIUM SERPL-SCNC: 140 MMOL/L (ref 136–145)
WBC # BLD AUTO: 15.4 K/UL (ref 4.8–10.8)

## 2019-08-02 RX ADMIN — APIXABAN SCH MG: 5 TABLET, FILM COATED ORAL at 10:32

## 2019-08-02 RX ADMIN — INSULIN ASPART SCH UNITS: 100 INJECTION, SOLUTION INTRAVENOUS; SUBCUTANEOUS at 13:23

## 2019-08-02 RX ADMIN — HYDRALAZINE HYDROCHLORIDE SCH MG: 50 TABLET ORAL at 14:00

## 2019-08-02 RX ADMIN — TRAMADOL HYDROCHLORIDE PRN MG: 50 TABLET, FILM COATED ORAL at 10:32

## 2019-08-02 RX ADMIN — HYDRALAZINE HYDROCHLORIDE SCH MG: 50 TABLET ORAL at 21:22

## 2019-08-02 RX ADMIN — METOPROLOL TARTRATE SCH MG: 50 TABLET, FILM COATED ORAL at 23:39

## 2019-08-02 RX ADMIN — INSULIN ASPART SCH UNITS: 100 INJECTION, SOLUTION INTRAVENOUS; SUBCUTANEOUS at 21:31

## 2019-08-02 RX ADMIN — ATORVASTATIN CALCIUM SCH MG: 80 TABLET, FILM COATED ORAL at 21:22

## 2019-08-02 RX ADMIN — HYDRALAZINE HYDROCHLORIDE SCH MG: 50 TABLET ORAL at 06:04

## 2019-08-02 RX ADMIN — DOCUSATE SODIUM SCH MG: 100 CAPSULE, LIQUID FILLED ORAL at 10:32

## 2019-08-02 RX ADMIN — FLUTICASONE PROPIONATE AND SALMETEROL SCH PUFFS: 50; 250 POWDER RESPIRATORY (INHALATION) at 19:03

## 2019-08-02 RX ADMIN — ASPIRIN 81 MG SCH MG: 81 TABLET ORAL at 10:33

## 2019-08-02 RX ADMIN — APIXABAN SCH MG: 5 TABLET, FILM COATED ORAL at 18:34

## 2019-08-02 RX ADMIN — SITAGLIPTIN SCH MG: 25 TABLET, FILM COATED ORAL at 06:12

## 2019-08-02 RX ADMIN — DOCUSATE SODIUM SCH MG: 100 CAPSULE, LIQUID FILLED ORAL at 18:34

## 2019-08-02 RX ADMIN — TRAMADOL HYDROCHLORIDE PRN MG: 50 TABLET, FILM COATED ORAL at 17:28

## 2019-08-02 RX ADMIN — METOPROLOL TARTRATE SCH MG: 50 TABLET, FILM COATED ORAL at 06:03

## 2019-08-02 RX ADMIN — INSULIN DETEMIR SCH UNITS: 100 INJECTION, SOLUTION SUBCUTANEOUS at 10:34

## 2019-08-02 RX ADMIN — METOPROLOL TARTRATE SCH MG: 50 TABLET, FILM COATED ORAL at 18:33

## 2019-08-02 RX ADMIN — INSULIN ASPART SCH UNITS: 100 INJECTION, SOLUTION INTRAVENOUS; SUBCUTANEOUS at 17:25

## 2019-08-02 RX ADMIN — METOPROLOL TARTRATE SCH MG: 50 TABLET, FILM COATED ORAL at 00:02

## 2019-08-02 RX ADMIN — INSULIN ASPART SCH UNITS: 100 INJECTION, SOLUTION INTRAVENOUS; SUBCUTANEOUS at 06:30

## 2019-08-02 RX ADMIN — INSULIN DETEMIR SCH UNITS: 100 INJECTION, SOLUTION SUBCUTANEOUS at 18:36

## 2019-08-02 RX ADMIN — METOPROLOL TARTRATE SCH MG: 50 TABLET, FILM COATED ORAL at 13:20

## 2019-08-02 RX ADMIN — METFORMIN HYDROCHLORIDE SCH MG: 500 TABLET ORAL at 18:33

## 2019-08-02 RX ADMIN — METFORMIN HYDROCHLORIDE SCH MG: 500 TABLET ORAL at 10:32

## 2019-08-02 NOTE — NUR
*-* INSURANCE *-*



ALL CLINICALS AND REVIEWS HAVE BEEN FAXED TO:



Formerly [facility]

P: [phone]

F: [phone] (FAX CLINICALS)

## 2019-08-02 NOTE — NUR
NURSE NOTES:

notified Dr. Alva of latest labs and pt complaints of dizziness.  As well as if he wants 
to repeat venous duplex.

## 2019-08-02 NOTE — NUR
NURSE NOTES:

Pt Aox3 and restless. pt. in bed and asked to take bipap and was taken off however, started 
complaining about having a hard time breathing so respiratory tech put her back on Bipap.   
Pt. on cardiac monitor no signs of cardiac distress.  Pt pulled out IV last night and nurses 
have tried multiple times to put it on but have not been able to.  Will continue to try to 
put an new IV access.  Bed in lowest position and locked.  Call light with in reach.  Will 
continue to follow plan of care.   Plan to DC pt to snf facility continues.

## 2019-08-02 NOTE — NUR
NURSE NOTES:  Received patient from Maria T BLEVINS.  Patient in bed, on 2L NC, no s/s respiratory 
distress.  No IV access, multiple attempts by day shift and night shift.  Dr. Nida marcus, 
was contacted earlier in the morning, rocephin IV dc'd.  Patient in bed, refused SCDs, no 
c/o pain, alert and oriented x 2 to self, and year.  Does not know why and what city she is 
in.  Bed in low position, locked, bed alarm on, call light within reach.

## 2019-08-02 NOTE — DIAGNOSTIC IMAGING REPORT
Indication: Dyspnea

 

Technique: One view of the chest

 

Comparison: 7/30/2019

 

Findings: There is a left chest bifocal pacemaker again demonstrated. There is

decreased retrocardiac consolidation. There is persistent mild interstitial

congestion. The heart is enlarged

 

Impression: Persistent mild interstitial congestion, over 3 days

 

Improved retrocardiac consolidation and possibly decreased left pleural fluid

## 2019-08-02 NOTE — PULMONOLOGY PROGRESS NOTE
Assessment/Plan


Assessment/Plan


1. Congestive heart failure.


2. Diabetes, out of control.


3. Altered mental status due to CO2 narcosis


4. COPD with resp failure


5. Sleep apnea.


6. Atrial fibrillation, not on anticoagulants.


7. Coronary heart disease.


8. Aortic atherosclerosis.


9. Old cerebral infarct





resp failure, mild hypercarbia


NO diamox; it will make her more acidotic


steroid PO; this is causing high WBC


DC plan to snf if duplex ok


disc w dtr, RN





Subjective


ROS Limited/Unobtainable:  Yes


Allergies:  


Coded Allergies:  


     SULFA (SULFONAMIDE ANTIBIOTICS) (Unverified  Allergy, Unknown, 7/26/19)


Uncoded Allergies:  


     SULFA (Allergy, Unknown, 7/25/19)





Objective





Last 24 Hour Vital Signs








  Date Time  Temp Pulse Resp B/P (MAP) Pulse Ox O2 Delivery O2 Flow Rate FiO2


 


8/2/19 10:33  90  135/65    


 


8/2/19 08:42      Bi-pap 28.0 





      Nasal Cannula 2.0 





      Nasal Cannula 2.0 


 


8/2/19 08:00 97.5 72 18 114/74 (87) 98   


 


8/2/19 07:44     99 Bi-Pap  28


 


8/2/19 07:44  84 16  99 Facial  28


 


8/2/19 06:04    134/71    


 


8/2/19 06:03  81  134/71 (92)    


 


8/2/19 06:03  81  134/71    


 


8/2/19 05:04  84 14  99 Facial  28


 


8/2/19 04:00  74      


 


8/2/19 04:00 98.2 84 18 121/52 (75) 98   


 


8/2/19 03:15  78 24  100 Facial  28


 


8/2/19 01:51  84 24  99 Facial  28


 


8/2/19 00:02  75  134/64    


 


8/2/19 00:00 97.7 75 16 134/64 (87) 96   


 


8/1/19 23:14  89 19  99 Facial  28


 


8/1/19 21:00      Nasal Cannula 2.0 





      Nasal Cannula 2.0 





      Nasal Cannula 2.0 


 


8/1/19 21:00    133/68    


 


8/1/19 20:28  93 19  97 Facial  28


 


8/1/19 20:00 97.7 84 19 133/68 (89) 96   


 


8/1/19 19:00     94 Nasal Cannula 2.0 28


 


8/1/19 17:57  77  105/55    


 


8/1/19 16:00  78      


 


8/1/19 16:00 98.6 77 16 105/55 (72) 97   


 


8/1/19 15:04    124/73    


 


8/1/19 12:43  81  142/69    


 


8/1/19 12:00 98.6 81 18 142/69 (93) 94   


 


8/1/19 11:50  76      

















Intake and Output  


 


 8/1/19 8/2/19





 19:00 07:00


 


Intake Total 500 ml 


 


Output Total 1450 ml 


 


Balance -950 ml 


 


  


 


Intake Oral 500 ml 


 


Output Urine Total 1450 ml 


 


# Voids  2








General Appearance:  no acute distress


HEENT:  normocephalic


Respiratory/Chest:  lungs clear, decreased breath sounds


Cardiovascular:  normal rate


Extremities:  other - L leg tender





Microbiology








 Date/Time


Source Procedure


Growth Status


 


 


 8/1/19 18:00


External Cath Urine Culture - Preliminary


NO GROWTH Resulted








Laboratory Tests


8/1/19 17:50: 


Arterial Blood pH 7.322L, Arterial Blood Partial Pressure CO2 64.7*H, Arterial 

Blood Partial Pressure O2 106.3H, Arterial Blood HCO3 32.7H, Arterial Blood 

Oxygen Saturation 97.5, Arterial Blood Base Excess 4.9H, [place] Test Positive


8/1/19 18:00: 


Urine Color Pale yellow, Urine Appearance Slightly cloudy, Urine pH 5, Urine 

Specific Gravity 1.010, Urine Protein Negative, Urine Glucose (UA) 2+H, Urine 

Ketones Negative, Urine Blood 3+H, Urine Nitrite Negative, Urine Bilirubin 

Negative, Urine Urobilinogen Normal, Urine Leukocyte Esterase Negative, Urine 

RBC 10-15H, Urine WBC 0-2, Urine Squamous Epithelial Cells ManyH, Urine 

Bacteria None


8/2/19 09:15: 


White Blood Count 15.4H, Red Blood Count 4.13L, Hemoglobin 11.4L, Hematocrit 

37.6, Mean Corpuscular Volume 91, Mean Corpuscular Hemoglobin 27.6, Mean 

Corpuscular Hemoglobin Concent 30.3L, Red Cell Distribution Width 16.6H, 

Platelet Count 472H, Mean Platelet Volume 5.8L, Neutrophils (%) (Auto) 80.8H, 

Lymphocytes (%) (Auto) 9.2L, Monocytes (%) (Auto) 8.4, Eosinophils (%) (Auto) 

1.0, Basophils (%) (Auto) 0.6, Sodium Level 140, Potassium Level 4.6, Chloride 

Level 104, Carbon Dioxide Level 35H, Anion Gap 1L, Blood Urea Nitrogen 42H, 

Creatinine 1.3, Estimat Glomerular Filtration Rate , Glucose Level 143H, 

Calcium Level 8.7, Total Bilirubin 0.3, Aspartate Amino Transf (AST/SGOT) 15, 

Alanine Aminotransferase (ALT/SGPT) 13, Alkaline Phosphatase 109, Total Protein 

6.4, Albumin 2.5L, Globulin 3.9, Albumin/Globulin Ratio 0.6L





Current Medications








 Medications


  (Trade)  Dose


 Ordered  Sig/Michele


 Route


 PRN Reason  Start Time


 Stop Time Status Last Admin


Dose Admin


 


 Acetaminophen


  (Tylenol)  650 mg  Q6H  PRN


 ORAL


 Mild Pain/Temp > 100.5  7/30/19 18:15


 8/26/19 18:14  8/1/19 18:24


 


 


 Amlodipine


 Besylate


  (Norvasc)  5 mg  DAILY


 ORAL


   7/31/19 09:00


 8/26/19 08:59  8/2/19 10:33


 


 


 Apixaban


  (Eliquis)  5 mg  BID


 ORAL


   7/30/19 18:00


 8/25/19 08:59  8/2/19 10:32


 


 


 Aspirin


  (ASA)  81 mg  DAILY


 ORAL


   7/31/19 09:00


 8/25/19 08:59  8/2/19 10:33


 


 


 Atorvastatin


 Calcium


  (Lipitor)  80 mg  BEDTIME


 ORAL


   7/30/19 21:00


 8/25/19 20:59  8/1/19 21:00


 


 


 Clopidogrel


 Bisulfate


  (Plavix)  75 mg  DAILY


 ORAL


   7/31/19 09:00


 8/25/19 08:59  8/2/19 10:33


 


 


 Dextrose


  (Dextrose 50%)  25 ml  Q30M  PRN


 IV


 Hypoglycemia  7/30/19 18:15


 8/29/19 07:14   


 


 


 Dextrose


  (Dextrose 50%)  50 ml  Q30M  PRN


 IV


 Hypoglycemia  7/30/19 18:15


 8/29/19 07:14   


 


 


 Docusate Sodium


  (Colace)  100 mg  TWICE A  DAY


 ORAL


   8/2/19 09:00


 9/1/19 08:59  8/2/19 10:32


 


 


 Hydralazine HCl


  (Apresoline)  50 mg  EVERY 8  HOURS


 ORAL


   7/30/19 22:00


 8/25/19 05:59  8/2/19 06:04


 


 


 Insulin Aspart


  (NovoLOG)    BEFORE MEALS AND  HS


 SUBQ


   7/30/19 21:00


 8/29/19 11:29  8/1/19 21:03


 


 


 Insulin Detemir


  (Levemir)  12 units  BID


 SUBQ


   7/31/19 09:00


 8/25/19 20:59  8/2/19 10:34


 


 


 Metformin HCl


  (Glucophage)  500 mg  BID


 ORAL


   8/1/19 09:00


 8/31/19 08:59  8/2/19 10:32


 


 


 Metoprolol


 Tartrate


  (Lopressor)  50 mg  Q6HR


 ORAL


   7/30/19 18:00


 8/26/19 18:29  8/2/19 06:03


 


 


 Nateglinide


  (Starlix)  120 mg  TIAC


 ORAL


   7/31/19 11:30


 8/30/19 11:29  8/2/19 06:12


 


 


 Pantoprazole


  (Protonix)  40 mg  ACBREAKFAST


 ORAL


   8/1/19 06:30


 8/31/19 06:29  8/2/19 06:03


 


 


 Prednisone


  (predniSONE)  10 mg  DAILY


 ORAL


   8/1/19 09:00


 8/31/19 08:59  8/2/19 10:32


 


 


 Sitagliptin


 Phosphate


  (Januvia)  25 mg  ACBREAKFAST


 ORAL


   8/1/19 06:30


 8/31/19 06:29  8/2/19 06:12


 


 


 Tramadol HCl


  (Ultram)  50 mg  Q6H  PRN


 ORAL


 Severe Pain (Pain Scale 7-10)  8/1/19 20:30


 8/8/19 20:29  8/2/19 10:32


 

















Yakov Alva MD Aug 2, 2019 11:11

## 2019-08-02 NOTE — NUR
HAND-OFF: 

Report given to Gagan/rn. Endorsed to RN to contact [facility] in regards to DC 
order and to inform if pt needs Bipap at SNF because they are doing the SNF arrangements.  
[facility] works weekends and is waiting for a call back to finalize arrangements.

## 2019-08-02 NOTE — PROGRESS NOTE
DATE:  08/01/2019

CARDIOLOGY PROGRESS NOTE



SUBJECTIVE:  The patient has no respiratory distress, however, today she

has displayed signs of CO2 narcosis.  She remains on steroids and inhaled

bronchodilators.  She continues with anticoagulation for cardioembolic

prophylaxis.



OBJECTIVE:

VITAL SIGNS:  Blood pressure 124/73, heart rate 81, respiratory rate 18,

and afebrile.

LUNGS:  Diminished breath sounds.  No wheezing.

CARDIAC:  Regular rhythm and rate.  Normal S1 and S2.

ABDOMEN:  Soft.  No edema.



LABORATORY DATA:  White count 16.8 and hemoglobin 12.6.  Urinalysis today

with 10 to 15 red cells.  Sodium 143, potassium 4.8, bicarbonate 38, BUN

51, and creatinine 1.4.  Albumin 2.9.  ABG - pH 7.32, pCO2 64, and pO2

106.



IMPRESSION:

1. Acute on chronic respiratory acidosis.

2. Episodic asterixis.

3. Metabolic encephalopathy.

4. Paroxysmal atrial fibrillation.

5. 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

6. Chronic obstructive pulmonary disease, status post respiratory

failure.



PLAN:

1. BiPAP support.

2. Monitor blood gases and clinical parameters.

3. Continue cardioembolic prophylaxis.

4. Consider acetazolamide to stimulate respiratory drive.

5. ____ diuretic dosing.









  ______________________________________________

  Keon Coker M.D. DR:  GEETHA

D:  08/02/2019 01:42

T:  08/02/2019 04:06

JOB#:  [id number]

CC:

## 2019-08-02 NOTE — NUR
NURSE NOTES:

pt will need to person assist to stand up and to put into bed.  pt dizzy and weak.  B/P 
135/65 hr 91 O2 95-97 on 2L Oxygen.

## 2019-08-02 NOTE — GENERAL PROGRESS NOTE
Assessment/Plan


Problem List:  


(1) Bacteriuria


ICD Codes:  R82.71 - Bacteriuria


SNOMED:  51202357


(2) Uncontrolled diabetes mellitus


ICD Codes:  E11.65 - Type 2 diabetes mellitus with hyperglycemia


SNOMED:  34988833, 986321642


Qualifiers:  


   Qualified Codes:  E11.65 - Type 2 diabetes mellitus with hyperglycemia


(3) Acute exacerbation of CHF (congestive heart failure)


ICD Codes:  I50.9 - Heart failure, unspecified


SNOMED:  711888976, [id number]


Qualifiers:  


   Qualified Codes:  I50.9 - Heart failure, unspecified


Assessment/Plan:


continue Levemir 12 units bid 


continue Januvia 25 mg daily 


continue Starlix 120 mg ac tid 


continue Metformin 500 mg bid


continue NISS ac / hs





Subjective


Allergies:  


Coded Allergies:  


     SULFA (SULFONAMIDE ANTIBIOTICS) (Unverified  Allergy, Unknown, 7/26/19)


Uncoded Allergies:  


     SULFA (Allergy, Unknown, 7/25/19)


All Systems:  reviewed and negative except above


Subjective


events noted 


glucose values improving 

















Item Value  Date Time


 


Bedside Blood Glucose 280 mg/dl H 8/1/19 2103


 


Bedside Blood Glucose 317 mg/dl H 8/1/19 1820


 


Bedside Blood Glucose 144 mg/dl H 8/1/19 1245


 


Bedside Blood Glucose 155 mg/dl H 8/1/19 1020


 


Bedside Blood Glucose 150 mg/dl H 8/1/19 0630











Objective





Last 24 Hour Vital Signs








  Date Time  Temp Pulse Resp B/P (MAP) Pulse Ox O2 Delivery O2 Flow Rate FiO2


 


8/2/19 05:04  84 14  99 Facial  28


 


8/2/19 04:00 98.2 84 18 121/52 (75) 98   


 


8/2/19 03:15  78 24  100 Facial  28


 


8/2/19 01:51  84 24  99 Facial  28


 


8/2/19 00:02  75  134/64    


 


8/2/19 00:00 97.7 75 16 134/64 (87) 96   


 


8/1/19 23:14  89 19  99 Facial  28


 


8/1/19 21:00      Nasal Cannula 2.0 





      Nasal Cannula 2.0 





      Nasal Cannula 2.0 


 


8/1/19 21:00    133/68    


 


8/1/19 20:28  93 19  97 Facial  28


 


8/1/19 20:00 97.7 84 19 133/68 (89) 96   


 


8/1/19 19:00     94 Nasal Cannula 2.0 28


 


8/1/19 17:57  77  105/55    


 


8/1/19 16:00  78      


 


8/1/19 16:00 98.6 77 16 105/55 (72) 97   


 


8/1/19 15:04    124/73    


 


8/1/19 12:43  81  142/69    


 


8/1/19 12:00 98.6 81 18 142/69 (93) 94   


 


8/1/19 11:50  76      


 


8/1/19 09:22  85  134/67    


 


8/1/19 09:00      Nasal Cannula 2.0 





      Nasal Cannula 2.0 





      Nasal Cannula 2.0 


 


8/1/19 08:00 96.1 85 18 134/67 (89) 97   


 


8/1/19 07:39  75      


 


8/1/19 05:49  87  147/61    


 


8/1/19 05:48    147/61    

















Intake and Output  


 


 8/1/19 8/2/19





 18:59 06:59


 


Intake Total 500 ml 


 


Output Total 1450 ml 


 


Balance -950 ml 


 


  


 


Intake Oral 500 ml 


 


Output Urine Total 1450 ml 








Laboratory Tests


8/1/19 17:50: 


Arterial Blood pH 7.322L, Arterial Blood Partial Pressure CO2 64.7*H, Arterial 

Blood Partial Pressure O2 106.3H, Arterial Blood HCO3 32.7H, Arterial Blood 

Oxygen Saturation 97.5, Arterial Blood Base Excess 4.9H, [place] Test Positive


8/1/19 18:00: 


Urine Color Pale yellow, Urine Appearance Slightly cloudy, Urine pH 5, Urine 

Specific Gravity 1.010, Urine Protein Negative, Urine Glucose (UA) 2+H, Urine 

Ketones Negative, Urine Blood 3+H, Urine Nitrite Negative, Urine Bilirubin 

Negative, Urine Urobilinogen Normal, Urine Leukocyte Esterase Negative, Urine 

RBC 10-15H, Urine WBC 0-2, Urine Squamous Epithelial Cells ManyH, Urine 

Bacteria None


Height (Feet):  5


Height (Inches):  4.00


Weight (Pounds):  182


General Appearance:  no apparent distress


Neck:  normal alignment


Cardiovascular:  normal rate


Respiratory/Chest:  decreased breath sounds


Abdomen:  normal bowel sounds


Pelvis:  normal external exam


Objective





Current Medications








 Medications


  (Trade)  Dose


 Ordered  Sig/Michele


 Route


 PRN Reason  Start Time


 Stop Time Status Last Admin


Dose Admin


 


 Acetaminophen


  (Tylenol)  650 mg  Q6H  PRN


 ORAL


 Mild Pain/Temp > 100.5  7/30/19 18:15


 8/26/19 18:14  8/1/19 18:24


 


 


 Acetazolamide


  (Diamox)  250 mg  TWICE A  DAY


 ORAL


   8/2/19 09:00


 9/1/19 08:59   


 


 


 Amlodipine


 Besylate


  (Norvasc)  5 mg  DAILY


 ORAL


   7/31/19 09:00


 8/26/19 08:59  8/1/19 09:22


 


 


 Apixaban


  (Eliquis)  5 mg  BID


 ORAL


   7/30/19 18:00


 8/25/19 08:59  8/1/19 17:48


 


 


 Aspirin


  (ASA)  81 mg  DAILY


 ORAL


   7/31/19 09:00


 8/25/19 08:59  8/1/19 09:23


 


 


 Atorvastatin


 Calcium


  (Lipitor)  80 mg  BEDTIME


 ORAL


   7/30/19 21:00


 8/25/19 20:59  8/1/19 21:00


 


 


 Ceftriaxone


 Sodium 1 gm/


 Sodium Chloride  55 ml @ 


 110 mls/hr  DAILY


 IVPB


   7/31/19 12:00


 8/4/19 23:00  8/1/19 09:21


 


 


 Clopidogrel


 Bisulfate


  (Plavix)  75 mg  DAILY


 ORAL


   7/31/19 09:00


 8/25/19 08:59  8/1/19 09:23


 


 


 Dextrose


  (Dextrose 50%)  25 ml  Q30M  PRN


 IV


 Hypoglycemia  7/30/19 18:15


 8/29/19 07:14   


 


 


 Dextrose


  (Dextrose 50%)  50 ml  Q30M  PRN


 IV


 Hypoglycemia  7/30/19 18:15


 8/29/19 07:14   


 


 


 Docusate Sodium


  (Colace)  100 mg  TWICE A  DAY


 ORAL


   8/2/19 09:00


 9/1/19 08:59   


 


 


 Hydralazine HCl


  (Apresoline)  50 mg  EVERY 8  HOURS


 ORAL


   7/30/19 22:00


 8/25/19 05:59  8/1/19 21:00


 


 


 Insulin Aspart


  (NovoLOG)    BEFORE MEALS AND  HS


 SUBQ


   7/30/19 21:00


 8/29/19 11:29  8/1/19 21:03


 


 


 Insulin Detemir


  (Levemir)  12 units  BID


 SUBQ


   7/31/19 09:00


 8/25/19 20:59  8/1/19 18:20


 


 


 Metformin HCl


  (Glucophage)  500 mg  BID


 ORAL


   8/1/19 09:00


 8/31/19 08:59  8/1/19 17:48


 


 


 Metoprolol


 Tartrate


  (Lopressor)  50 mg  Q6HR


 ORAL


   7/30/19 18:00


 8/26/19 18:29  8/2/19 00:02


 


 


 Nateglinide


  (Starlix)  120 mg  TIAC


 ORAL


   7/31/19 11:30


 8/30/19 11:29  8/1/19 17:48


 


 


 Pantoprazole


  (Protonix)  40 mg  ACBREAKFAST


 ORAL


   8/1/19 06:30


 8/31/19 06:29  8/1/19 05:53


 


 


 Prednisone


  (predniSONE)  10 mg  DAILY


 ORAL


   8/1/19 09:00


 8/31/19 08:59  8/1/19 09:23


 


 


 Sitagliptin


 Phosphate


  (Januvia)  25 mg  ACBREAKFAST


 ORAL


   8/1/19 06:30


 8/31/19 06:29  8/1/19 05:53


 


 


 Tramadol HCl


  (Ultram)  50 mg  Q6H  PRN


 ORAL


 Severe Pain (Pain Scale 7-10)  8/1/19 20:30


 8/8/19 20:29   


 

















Paolo Mendoza MD Aug 2, 2019 05:44

## 2019-08-02 NOTE — NUR
NURSE NOTES:

pr doctor zia no new ABG's are needed, bipap to be use at night only.  T/o for lower 
venous duplex due to left leg pain.

## 2019-08-03 VITALS — SYSTOLIC BLOOD PRESSURE: 123 MMHG | DIASTOLIC BLOOD PRESSURE: 55 MMHG

## 2019-08-03 VITALS — DIASTOLIC BLOOD PRESSURE: 61 MMHG | SYSTOLIC BLOOD PRESSURE: 132 MMHG

## 2019-08-03 VITALS — SYSTOLIC BLOOD PRESSURE: 128 MMHG | DIASTOLIC BLOOD PRESSURE: 57 MMHG

## 2019-08-03 VITALS — SYSTOLIC BLOOD PRESSURE: 121 MMHG | DIASTOLIC BLOOD PRESSURE: 57 MMHG

## 2019-08-03 VITALS — SYSTOLIC BLOOD PRESSURE: 102 MMHG | DIASTOLIC BLOOD PRESSURE: 65 MMHG

## 2019-08-03 VITALS — SYSTOLIC BLOOD PRESSURE: 119 MMHG | DIASTOLIC BLOOD PRESSURE: 74 MMHG

## 2019-08-03 VITALS — DIASTOLIC BLOOD PRESSURE: 59 MMHG | SYSTOLIC BLOOD PRESSURE: 121 MMHG

## 2019-08-03 LAB
ADD MANUAL DIFF: NO
ALBUMIN SERPL-MCNC: 2.6 G/DL (ref 3.4–5)
ALBUMIN/GLOB SERPL: 0.6 {RATIO} (ref 1–2.7)
ALP SERPL-CCNC: 116 U/L (ref 46–116)
ALT SERPL-CCNC: 12 U/L (ref 12–78)
ANION GAP SERPL CALC-SCNC: 3 MMOL/L (ref 5–15)
AST SERPL-CCNC: 17 U/L (ref 15–37)
BASOPHILS NFR BLD AUTO: 0.9 % (ref 0–2)
BILIRUB SERPL-MCNC: 0.3 MG/DL (ref 0.2–1)
BUN SERPL-MCNC: 47 MG/DL (ref 7–18)
CALCIUM SERPL-MCNC: 9.4 MG/DL (ref 8.5–10.1)
CHLORIDE SERPL-SCNC: 99 MMOL/L (ref 98–107)
CO2 SERPL-SCNC: 35 MMOL/L (ref 21–32)
CREAT SERPL-MCNC: 1.4 MG/DL (ref 0.55–1.3)
EOSINOPHIL NFR BLD AUTO: 1.4 % (ref 0–3)
ERYTHROCYTE [DISTWIDTH] IN BLOOD BY AUTOMATED COUNT: 16.2 % (ref 11.6–14.8)
GLOBULIN SER-MCNC: 4.2 G/DL
HCT VFR BLD CALC: 38.8 % (ref 37–47)
HGB BLD-MCNC: 12 G/DL (ref 12–16)
LYMPHOCYTES NFR BLD AUTO: 13 % (ref 20–45)
MCV RBC AUTO: 91 FL (ref 80–99)
MONOCYTES NFR BLD AUTO: 9.5 % (ref 1–10)
NEUTROPHILS NFR BLD AUTO: 75.3 % (ref 45–75)
PLATELET # BLD: 452 K/UL (ref 150–450)
POTASSIUM SERPL-SCNC: 5.1 MMOL/L (ref 3.5–5.1)
RBC # BLD AUTO: 4.24 M/UL (ref 4.2–5.4)
SODIUM SERPL-SCNC: 137 MMOL/L (ref 136–145)
WBC # BLD AUTO: 11.7 K/UL (ref 4.8–10.8)

## 2019-08-03 RX ADMIN — METFORMIN HYDROCHLORIDE SCH MG: 500 TABLET ORAL at 17:33

## 2019-08-03 RX ADMIN — METOPROLOL TARTRATE SCH MG: 50 TABLET, FILM COATED ORAL at 18:00

## 2019-08-03 RX ADMIN — INSULIN ASPART SCH UNITS: 100 INJECTION, SOLUTION INTRAVENOUS; SUBCUTANEOUS at 07:03

## 2019-08-03 RX ADMIN — APIXABAN SCH MG: 5 TABLET, FILM COATED ORAL at 18:00

## 2019-08-03 RX ADMIN — APIXABAN SCH MG: 5 TABLET, FILM COATED ORAL at 09:05

## 2019-08-03 RX ADMIN — INSULIN DETEMIR SCH UNITS: 100 INJECTION, SOLUTION SUBCUTANEOUS at 17:45

## 2019-08-03 RX ADMIN — HYDRALAZINE HYDROCHLORIDE SCH MG: 50 TABLET ORAL at 21:24

## 2019-08-03 RX ADMIN — METOPROLOL TARTRATE SCH MG: 50 TABLET, FILM COATED ORAL at 06:56

## 2019-08-03 RX ADMIN — ATORVASTATIN CALCIUM SCH MG: 80 TABLET, FILM COATED ORAL at 21:19

## 2019-08-03 RX ADMIN — FLUTICASONE PROPIONATE AND SALMETEROL SCH PUFFS: 50; 250 POWDER RESPIRATORY (INHALATION) at 08:33

## 2019-08-03 RX ADMIN — INSULIN ASPART SCH UNITS: 100 INJECTION, SOLUTION INTRAVENOUS; SUBCUTANEOUS at 21:24

## 2019-08-03 RX ADMIN — ASPIRIN 81 MG SCH MG: 81 TABLET ORAL at 09:06

## 2019-08-03 RX ADMIN — FLUTICASONE PROPIONATE AND SALMETEROL SCH PUFFS: 50; 250 POWDER RESPIRATORY (INHALATION) at 18:39

## 2019-08-03 RX ADMIN — INSULIN ASPART SCH UNITS: 100 INJECTION, SOLUTION INTRAVENOUS; SUBCUTANEOUS at 11:31

## 2019-08-03 RX ADMIN — HYDRALAZINE HYDROCHLORIDE SCH MG: 50 TABLET ORAL at 14:12

## 2019-08-03 RX ADMIN — HYDRALAZINE HYDROCHLORIDE SCH MG: 50 TABLET ORAL at 06:56

## 2019-08-03 RX ADMIN — DOCUSATE SODIUM SCH MG: 100 CAPSULE, LIQUID FILLED ORAL at 17:32

## 2019-08-03 RX ADMIN — INSULIN ASPART SCH UNITS: 100 INJECTION, SOLUTION INTRAVENOUS; SUBCUTANEOUS at 17:44

## 2019-08-03 RX ADMIN — METOPROLOL TARTRATE SCH MG: 50 TABLET, FILM COATED ORAL at 11:32

## 2019-08-03 RX ADMIN — INSULIN DETEMIR SCH UNITS: 100 INJECTION, SOLUTION SUBCUTANEOUS at 09:18

## 2019-08-03 RX ADMIN — METFORMIN HYDROCHLORIDE SCH MG: 500 TABLET ORAL at 09:06

## 2019-08-03 RX ADMIN — SITAGLIPTIN SCH MG: 25 TABLET, FILM COATED ORAL at 06:56

## 2019-08-03 RX ADMIN — DOCUSATE SODIUM SCH MG: 100 CAPSULE, LIQUID FILLED ORAL at 09:06

## 2019-08-03 NOTE — NUR
NURSE NOTES:



Received new orders from Dr. Giron including increasing Januvia dose from 25mg to 50mg PO. 
Noted and carried out.

## 2019-08-03 NOTE — NUR
HAND-OFF: 

Report given to GEN John.  patient is resting in bed.  patient states she has no more nausea. 
 Patient request to be off the bipap to eat dinner.

## 2019-08-03 NOTE — NUR
HAND-OFF: 



Report given to GEN Gomez. Patient is asleep lying semi-peters's; resting comfortably. On 
BiPAP. In stable condition.

## 2019-08-03 NOTE — NUR
NURSE NOTES:

Received patient from GEN Farah.  Patient in bed, on Bipap 12/5, no s/s respiratory 
distress.  No IV access, multiple attempts by day shift and night shift.  Dr. Alva aware.  
Patient in bed, refused SCDs, no c/o pain, alert and oriented x 2 to self, and year.  Bed in 
low position, locked, bed alarm on, call light within reach.

## 2019-08-03 NOTE — NUR
NURSE NOTES:



Received report from GEN John. Patient is asleep lying semi-peters's; resting comfortably. 
Arousable to shaking. No signs of acute distress noted; denies pain at this time. On BiPAP. 
AOx3-4; able to make needs known. Primarily Ukrainian speaking. Checked IV site; patent and 
flushed. No erythema, bleeding, or infiltration noted. Renee catheter draining well to 
gravity. Bed at lowest position, brakes on, siderails up x3. Call light within reach. Will 
continue to monitor.

## 2019-08-03 NOTE — PULMONOLOGY PROGRESS NOTE
Assessment/Plan


Assessment/Plan


1. Congestive heart failure.


2. Diabetes, out of control.


3. Altered mental status due to CO2 narcosis


4. COPD with resp failure


5. Sleep apnea.


6. Atrial fibrillation, not on anticoagulants.


7. Coronary heart disease.


8. Aortic atherosclerosis.


9. Old cerebral infarct





resp failure, mild hypercarbia


NO diamox; it will make her more acidotic


steroid PO; this is causing high WBC


DC plan to snf if duplex ok, stillpending


disc w dtr, RN





Subjective


ROS Limited/Unobtainable:  Yes


Allergies:  


Coded Allergies:  


     SULFA (SULFONAMIDE ANTIBIOTICS) (Unverified  Allergy, Unknown, 7/26/19)


Uncoded Allergies:  


     SULFA (Allergy, Unknown, 7/25/19)


Subjective


lethargic


 not on bipap


no distress


no po


not getting oob


on o2


no fever





Objective





Last 24 Hour Vital Signs








  Date Time  Temp Pulse Resp B/P (MAP) Pulse Ox O2 Delivery O2 Flow Rate FiO2


 


8/3/19 08:00 97.2 75 18 121/57 (78) 98   


 


8/3/19 06:56  82  123/55    


 


8/3/19 06:56    123/55    


 


8/3/19 06:52  82  123/55 (77)    


 


8/3/19 06:34     97 Bi-Pap  28


 


8/3/19 06:31  78 16  97 Facial  28


 


8/3/19 04:56  86 17  96 Facial  28


 


8/3/19 04:00 98.0 89 18 119/74 (89) 98   


 


8/3/19 03:41  75      


 


8/3/19 03:20  70 15  97 Facial  28


 


8/3/19 01:00  64 14  96 Facial 2.0 28


 


8/3/19 00:00 98.0 82  121/59 (79)    


 


8/2/19 23:43  84      


 


8/2/19 23:39  82  121/59    


 


8/2/19 22:30  67 16  97 Facial 2.0 28


 


8/2/19 21:22    114/70    


 


8/2/19 21:19  88  114/70 (85)    


 


8/2/19 21:00      Nasal Cannula 2.0 





      Nasal Cannula 2.0 





      Nasal Cannula 2.0 


 


8/2/19 20:18  95      


 


8/2/19 20:00 98.5 86 18 118/75 (89) 97   


 


8/2/19 19:05  70 18  96 Nasal Cannula 2.0 28


 


8/2/19 19:03  70 18  96 Nasal Cannula 2.0 28


 


8/2/19 19:03     96 Nasal Cannula 2.0 28


 


8/2/19 18:33  86  122/64    


 


8/2/19 17:58 97.2       


 


8/2/19 16:00 97.2 86 18 122/64 (83) 98   


 


8/2/19 16:00  83      


 


8/2/19 13:20  86  142/57    


 


8/2/19 12:00  81      


 


8/2/19 12:00 97.2 86 20 142/57 (85) 99   


 


8/2/19 10:33  90  135/65    


 


8/2/19 08:42      Bi-pap 28.0 





      Nasal Cannula 2.0 





      Nasal Cannula 2.0 

















Intake and Output  


 


 8/2/19 8/3/19





 18:59 06:59


 


Intake Total 460 ml 


 


Output Total 1200 ml 700 ml


 


Balance -740 ml -700 ml


 


  


 


Intake Oral 460 ml 


 


Output Urine Total 1200 ml 700 ml








General Appearance:  WD/WN


Respiratory/Chest:  crackles/rales, rhonchi


Cardiovascular:  normal rate, regular rhythm


Abdomen:  soft, non tender, no organomegaly


Neurologic/Psychiatric:  disoriented





Microbiology








 Date/Time


Source Procedure


Growth Status


 


 


 8/1/19 18:00


External Cath Urine Culture - Preliminary


Yeast Species Resulted








Laboratory Tests


8/2/19 09:15: 


White Blood Count 15.4H, Red Blood Count 4.13L, Hemoglobin 11.4L, Hematocrit 

37.6, Mean Corpuscular Volume 91, Mean Corpuscular Hemoglobin 27.6, Mean 

Corpuscular Hemoglobin Concent 30.3L, Red Cell Distribution Width 16.6H, 

Platelet Count 472H, Mean Platelet Volume 5.8L, Neutrophils (%) (Auto) 80.8H, 

Lymphocytes (%) (Auto) 9.2L, Monocytes (%) (Auto) 8.4, Eosinophils (%) (Auto) 

1.0, Basophils (%) (Auto) 0.6, Sodium Level 140, Potassium Level 4.6, Chloride 

Level 104, Carbon Dioxide Level 35H, Anion Gap 1L, Blood Urea Nitrogen 42H, 

Creatinine 1.3, Estimat Glomerular Filtration Rate , Glucose Level 143H, 

Calcium Level 8.7, Total Bilirubin 0.3, Aspartate Amino Transf (AST/SGOT) 15, 

Alanine Aminotransferase (ALT/SGPT) 13, Alkaline Phosphatase 109, Total Protein 

6.4, Albumin 2.5L, Globulin 3.9, Albumin/Globulin Ratio 0.6L


8/3/19 06:47: 


White Blood Count 11.7H, Red Blood Count 4.24, Hemoglobin 12.0, Hematocrit 38.8

, Mean Corpuscular Volume 91, Mean Corpuscular Hemoglobin 28.2, Mean 

Corpuscular Hemoglobin Concent 30.8L, Red Cell Distribution Width 16.2H, 

Platelet Count 452H, Mean Platelet Volume 6.0L, Neutrophils (%) (Auto) 75.3H, 

Lymphocytes (%) (Auto) 13.0L, Monocytes (%) (Auto) 9.5, Eosinophils (%) (Auto) 

1.4, Basophils (%) (Auto) 0.9, Sodium Level 137, Potassium Level 5.1, Chloride 

Level 99, Carbon Dioxide Level 35H, Anion Gap 3L, Blood Urea Nitrogen 47H, 

Creatinine 1.4H, Estimat Glomerular Filtration Rate , Glucose Level 174H, 

Calcium Level 9.4, Total Bilirubin 0.3, Aspartate Amino Transf (AST/SGOT) 17, 

Alanine Aminotransferase (ALT/SGPT) 12, Alkaline Phosphatase 116, Total Protein 

6.8, Albumin 2.6L, Globulin 4.2, Albumin/Globulin Ratio 0.6L, Thyroid 

Stimulating Hormone (TSH) 3.193





Current Medications








 Medications


  (Trade)  Dose


 Ordered  Sig/Michele


 Route


 PRN Reason  Start Time


 Stop Time Status Last Admin


Dose Admin


 


 Acetaminophen


  (Tylenol)  650 mg  Q6H  PRN


 ORAL


 Mild Pain/Temp > 100.5  7/30/19 18:15


 8/26/19 18:14  8/1/19 18:24


 


 


 Amlodipine


 Besylate


  (Norvasc)  5 mg  DAILY


 ORAL


   7/31/19 09:00


 8/26/19 08:59  8/2/19 10:33


 


 


 Apixaban


  (Eliquis)  5 mg  BID


 ORAL


   7/30/19 18:00


 8/25/19 08:59  8/2/19 18:34


 


 


 Aspirin


  (ASA)  81 mg  DAILY


 ORAL


   7/31/19 09:00


 8/25/19 08:59  8/2/19 10:33


 


 


 Atorvastatin


 Calcium


  (Lipitor)  80 mg  BEDTIME


 ORAL


   7/30/19 21:00


 8/25/19 20:59  8/2/19 21:22


 


 


 Clopidogrel


 Bisulfate


  (Plavix)  75 mg  DAILY


 ORAL


   7/31/19 09:00


 8/25/19 08:59  8/2/19 10:33


 


 


 Dextrose


  (Dextrose 50%)  25 ml  Q30M  PRN


 IV


 Hypoglycemia  7/30/19 18:15


 8/29/19 07:14   


 


 


 Dextrose


  (Dextrose 50%)  50 ml  Q30M  PRN


 IV


 Hypoglycemia  7/30/19 18:15


 8/29/19 07:14   


 


 


 Docusate Sodium


  (Colace)  100 mg  TWICE A  DAY


 ORAL


   8/2/19 09:00


 9/1/19 08:59  8/2/19 18:34


 


 


 Hydralazine HCl


  (Apresoline)  50 mg  EVERY 8  HOURS


 ORAL


   7/30/19 22:00


 8/25/19 05:59  8/3/19 06:56


 


 


 Insulin Aspart


  (NovoLOG)    BEFORE MEALS AND  HS


 SUBQ


   7/30/19 21:00


 8/29/19 11:29  8/3/19 07:03


 


 


 Insulin Detemir


  (Levemir)  12 units  BID


 SUBQ


   7/31/19 09:00


 8/25/19 20:59  8/2/19 18:36


 


 


 Metformin HCl


  (Glucophage)  500 mg  BID


 ORAL


   8/1/19 09:00


 8/31/19 08:59  8/2/19 18:33


 


 


 Metoprolol


 Tartrate


  (Lopressor)  50 mg  Q6HR


 ORAL


   7/30/19 18:00


 8/26/19 18:29  8/3/19 06:56


 


 


 Nateglinide


  (Starlix)  120 mg  TIAC


 ORAL


   7/31/19 11:30


 8/30/19 11:29  8/3/19 06:57


 


 


 Pantoprazole


  (Protonix)  40 mg  ACBREAKFAST


 ORAL


   8/1/19 06:30


 8/31/19 06:29  8/3/19 06:56


 


 


 Prednisone


  (predniSONE)  10 mg  DAILY


 ORAL


   8/1/19 09:00


 8/31/19 08:59  8/2/19 10:32


 


 


 Salmeterol


 Xinafoate/


 Fluticasone


  (Advair 250/50


 Diskus)  1 puffs  BID


 INH


   8/2/19 18:00


 9/1/19 17:59  8/3/19 08:33


 


 


 Sitagliptin


 Phosphate


  (Januvia)  25 mg  ACBREAKFAST


 ORAL


   8/1/19 06:30


 8/31/19 06:29  8/3/19 06:56


 


 


 Tramadol HCl


  (Ultram)  50 mg  Q6H  PRN


 ORAL


 Severe Pain (Pain Scale 7-10)  8/1/19 20:30


 8/8/19 20:29  8/2/19 17:28


 

















Magdalena Main DO Aug 3, 2019 08:41

## 2019-08-03 NOTE — CARDIOLOGY REPORT
--------------- APPROVED REPORT --------------





EKG Measurement

Heart [id number]

[id number]

[id number]

[id number]





Atrial fibrillation with occasional and with premature ventricular or aberrantly 

conducted complexes

Right bundle branch block

Left posterior fascicular block

*** Bifascicular block ***

Abnormal ECG

## 2019-08-03 NOTE — NUR
NURSE NOTES:

Pt is resting in bed in supine position, on bipap. Pt is alert and oriented x4. Has L chest 
pacemaker, AV paced. In no acute distress, breathing regular, even and unlabored. Has zaidi 
catheter in place. 

Was told to endorse to nurse that when pt will be discharged, discharging RN will need to 
contact [employer] insurance to inform them to arrange Bipap if needed. [employer] 
[employer] works weekends and is waiting for a call back to finalize arrangements.

## 2019-08-03 NOTE — INFECTIOUS DISEASES PROG NOTE
Assessment/Plan


Assessment/Plan





ASSESSMENT AND PLAN:





1. klebsiella uti, sepsis, chf vs cap/aspiration pna/hcap, respiratory failure 

leukocytosis, fevers, recent iv steroids 





   - change to vancomycin and zosyn 


   - patient more sob today 


   - monitor labs and chest x-ray, check sputum culture 


   - leukocytosis better, chest x-ray with improved consolidation 


   - f/u urine culture with yeast - likely colonizer and would not treat 





2. Acute renal failure.


3. Anemia.


4. CHF.


5. Respiratory failure.


6. ICU care.


7. Diabetes.


8. Possible hypertension.


9. Blood sugar treatment per Endocrinology and primary.


10. Vent care per Dr. Alva and Dr. Main.


11. Falls.


12. Sick sinus syndrome, pacemaker.


13. Sleep apnea, COPD.


14. Anemia.


15. Diastolic heart disease.


16. Coronary artery disease.


17. CHF.


18. Dyslipidemia.


19. Neuropathy.


20. Obesity.


21. Vitamin D deficiency.


22. Allergies to sulfa drugs.


23. MAR is noted.


24. Case was discussed with RN.


25. Social history negative.


26. Family history noncontributory.


27. Continue treatment per primary consultants.





Subjective


Constitutional:  Reports: fatigue; Denies: fever


HEENT:  Reports: congestion - mild


Respiratory:  Reports: shortness of breath - mild


Cardiovascular:  Denies: chest pain


Gastrointestinal/Abdominal:  Denies: nausea, vomiting, diarrhea


Genitourinary:  Reports: other - + zaidi


Neurologic:  Denies: headache


Psychiatric:  Denies: depression


Skin:  Denies: rash


Hematologic:  Denies: bleeding


Musculoskeletal:  Denies: pain


Allergies:  


Coded Allergies:  


     SULFA (SULFONAMIDE ANTIBIOTICS) (Unverified  Allergy, Unknown, 7/26/19)


Uncoded Allergies:  


     SULFA (Allergy, Unknown, 7/25/19)





Objective


Vital Signs





Last 24 Hour Vital Signs








  Date Time  Temp Pulse Resp B/P (MAP) Pulse Ox O2 Delivery O2 Flow Rate FiO2


 


8/3/19 16:00 98.2 82 16 132/61 (84) 93   


 


8/3/19 14:12    110/65    


 


8/3/19 12:00 97.2 84 16 128/57 (80) 100   


 


8/3/19 11:56  82      


 


8/3/19 11:32  103  127/57    


 


8/3/19 09:06  62  121/57    


 


8/3/19 09:00      Nasal Cannula 2.0 





      Nasal Cannula 2.0 





      Nasal Cannula 2.0 


 


8/3/19 08:34  62 16  96 Nasal Cannula 2.0 28


 


8/3/19 08:34  62 16  96 Nasal Cannula 2.0 28


 


8/3/19 08:00 97.2 75 18 121/57 (78) 98   


 


8/3/19 07:55  76      


 


8/3/19 06:56  82  123/55    


 


8/3/19 06:56    123/55    


 


8/3/19 06:52  82  123/55 (77)    


 


8/3/19 06:34     97 Bi-Pap  28


 


8/3/19 06:31  78 16  97 Facial  28


 


8/3/19 04:56  86 17  96 Facial  28


 


8/3/19 04:00 98.0 89 18 119/74 (89) 98   


 


8/3/19 03:41  75      


 


8/3/19 03:20  70 15  97 Facial  28


 


8/3/19 01:00  64 14  96 Facial 2.0 28


 


8/3/19 00:00 98.0 82  121/59 (79)    


 


8/2/19 23:43  84      


 


8/2/19 23:39  82  121/59    


 


8/2/19 22:30  67 16  97 Facial 2.0 28


 


8/2/19 21:22    114/70    


 


8/2/19 21:19  88  114/70 (85)    


 


8/2/19 21:00      Nasal Cannula 2.0 





      Nasal Cannula 2.0 





      Nasal Cannula 2.0 


 


8/2/19 20:18  95      


 


8/2/19 20:00 98.5 86 18 118/75 (89) 97   


 


8/2/19 19:05  70 18  96 Nasal Cannula 2.0 28


 


8/2/19 19:03  70 18  96 Nasal Cannula 2.0 28


 


8/2/19 19:03     96 Nasal Cannula 2.0 28


 


8/2/19 18:33  86  122/64    


 


8/2/19 17:58 97.2       








Height (Feet):  5


Height (Inches):  4.00


Weight (Pounds):  187


General Appearance:  no acute distress


HEENT:  normocephalic, atraumatic, anicteric, mucous membranes moist


Respiratory/Chest:  no respiratory distress, no accessory muscle use, decreased 

breath sounds, crackles/rales, rhonchi - bilaterally


Cardiovascular:  normal rate, regular rhythm, no gallop/murmur, no JVD


Abdomen:  normal bowel sounds, soft, non tender, no organomegaly, non distended


Genitourinary:  other - + zaidi - urine slt cloudy


Extremities:  no cyanosis


Skin:  no rash


Neurologic/Psychiatric:  CNs II-XII grossly normal, alert, responsive


Lymphatic:  no neck adenopathy


Musculoskeletal:  no effusion


Objective





Chest x-ray - 7/30/19 - 





Comparison:  7/28/2019


 


A single view chest radiograph was obtained.


 


Findings:


 


Pulmonary vascular congestion suspected. Lung volumes are low. Patient has been


extubated. The left lung base is obscured. Pacemaker is noted. Bones are 

osteopenic.


 


IMPRESSION:


 


Suspected pulmonary vascular congestion


 


 





Chest x-ray - 8/2/19 - 





Procedure: XRAY Chest 1v


Indication: Dyspnea


 


Technique: One view of the chest


 


Comparison: 7/30/2019


 


Findings: There is a left chest bifocal pacemaker again demonstrated. There is


decreased retrocardiac consolidation. There is persistent mild interstitial


congestion. The heart is enlarged


 


Impression: Persistent mild interstitial congestion, over 3 days


 


Improved retrocardiac consolidation and possibly decreased left pleural fluid





Microbiology








 Date/Time


Source Procedure


Growth Status


 


 


 7/28/19 09:15


Blood Blood Culture - Final


NO GROWTH AFTER 5 DAYS Complete





 7/28/19 10:30


Sputum Induced Gram Stain - Final Complete


 


 7/28/19 10:30


Sputum Induced Sputum Culture - Final


NORMAL UPPER RESPIRATORY PEYTON PRESENT Complete


 


 8/1/19 18:00


External Cath Urine Culture - Preliminary


Yeast Species Resulted


 


 7/26/19 04:30


Rectum - Final


NO CARBAPENEM-RESISTANT ENTEROBACTERI... Complete








Microbiology








 Date/Time


Source Procedure


Growth Status


 


 


 8/1/19 18:00


External Cath Urine Culture - Preliminary


Yeast Species Resulted











Laboratory Tests








Test


  8/3/19


06:47


 


White Blood Count


  11.7 K/UL


(4.8-10.8)  H


 


Red Blood Count


  4.24 M/UL


(4.20-5.40)


 


Hemoglobin


  12.0 G/DL


(12.0-16.0)


 


Hematocrit


  38.8 %


(37.0-47.0)


 


Mean Corpuscular Volume 91 FL (80-99)  


 


Mean Corpuscular Hemoglobin


  28.2 PG


(27.0-31.0)


 


Mean Corpuscular Hemoglobin


Concent 30.8 G/DL


(32.0-36.0)  L


 


Red Cell Distribution Width


  16.2 %


(11.6-14.8)  H


 


Platelet Count


  452 K/UL


(150-450)  H


 


Mean Platelet Volume


  6.0 FL


(6.5-10.1)  L


 


Neutrophils (%) (Auto)


  75.3 %


(45.0-75.0)  H


 


Lymphocytes (%) (Auto)


  13.0 %


(20.0-45.0)  L


 


Monocytes (%) (Auto)


  9.5 %


(1.0-10.0)


 


Eosinophils (%) (Auto)


  1.4 %


(0.0-3.0)


 


Basophils (%) (Auto)


  0.9 %


(0.0-2.0)


 


Sodium Level


  137 MMOL/L


(136-145)


 


Potassium Level


  5.1 MMOL/L


(3.5-5.1)


 


Chloride Level


  99 MMOL/L


()


 


Carbon Dioxide Level


  35 MMOL/L


(21-32)  H


 


Anion Gap


  3 mmol/L


(5-15)  L


 


Blood Urea Nitrogen


  47 mg/dL


(7-18)  H


 


Creatinine


  1.4 MG/DL


(0.55-1.30)  H


 


Estimat Glomerular Filtration


Rate  mL/min (>60)  


 


 


Glucose Level


  174 MG/DL


()  H


 


Calcium Level


  9.4 MG/DL


(8.5-10.1)


 


Total Bilirubin


  0.3 MG/DL


(0.2-1.0)


 


Aspartate Amino Transf


(AST/SGOT) 17 U/L (15-37)


 


 


Alanine Aminotransferase


(ALT/SGPT) 12 U/L (12-78)


 


 


Alkaline Phosphatase


  116 U/L


()


 


Total Protein


  6.8 G/DL


(6.4-8.2)


 


Albumin


  2.6 G/DL


(3.4-5.0)  L


 


Globulin 4.2 g/dL  


 


Albumin/Globulin Ratio


  0.6 (1.0-2.7)


L


 


Thyroid Stimulating Hormone


(TSH) 3.193 uiU/mL


(0.358-3.740)











Current Medications








 Medications


  (Trade)  Dose


 Ordered  Sig/Michele


 Route


 PRN Reason  Start Time


 Stop Time Status Last Admin


Dose Admin


 


 Acetaminophen


  (Tylenol)  650 mg  Q6H  PRN


 ORAL


 Mild Pain/Temp > 100.5  7/30/19 18:15


 8/26/19 18:14  8/1/19 18:24


 


 


 Amlodipine


 Besylate


  (Norvasc)  5 mg  DAILY


 ORAL


   7/31/19 09:00


 8/26/19 08:59  8/3/19 09:06


 


 


 Apixaban


  (Eliquis)  5 mg  BID


 ORAL


   7/30/19 18:00


 8/25/19 08:59  8/3/19 09:05


 


 


 Aspirin


  (ASA)  81 mg  DAILY


 ORAL


   7/31/19 09:00


 8/25/19 08:59  8/3/19 09:06


 


 


 Atorvastatin


 Calcium


  (Lipitor)  80 mg  BEDTIME


 ORAL


   7/30/19 21:00


 8/25/19 20:59  8/2/19 21:22


 


 


 Clopidogrel


 Bisulfate


  (Plavix)  75 mg  DAILY


 ORAL


   7/31/19 09:00


 8/25/19 08:59  8/3/19 09:06


 


 


 Dextrose


  (Dextrose 50%)  25 ml  Q30M  PRN


 IV


 Hypoglycemia  7/30/19 18:15


 8/29/19 07:14   


 


 


 Dextrose


  (Dextrose 50%)  50 ml  Q30M  PRN


 IV


 Hypoglycemia  7/30/19 18:15


 8/29/19 07:14   


 


 


 Docusate Sodium


  (Colace)  100 mg  TWICE A  DAY


 ORAL


   8/2/19 09:00


 9/1/19 08:59  8/3/19 09:06


 


 


 Hydralazine HCl


  (Apresoline)  50 mg  EVERY 8  HOURS


 ORAL


   7/30/19 22:00


 8/25/19 05:59  8/3/19 14:12


 


 


 Insulin Aspart


  (NovoLOG)    BEFORE MEALS AND  HS


 SUBQ


   7/30/19 21:00


 8/29/19 11:29  8/3/19 11:31


 


 


 Insulin Detemir


  (Levemir)  12 units  BID


 SUBQ


   7/31/19 09:00


 8/25/19 20:59  8/3/19 09:18


 


 


 Metformin HCl


  (Glucophage)  500 mg  BID


 ORAL


   8/1/19 09:00


 8/31/19 08:59  8/3/19 09:06


 


 


 Metoprolol


 Tartrate


  (Lopressor)  50 mg  Q6HR


 ORAL


   7/30/19 18:00


 8/26/19 18:29  8/3/19 11:32


 


 


 Nateglinide


  (Starlix)  120 mg  TIAC


 ORAL


   7/31/19 11:30


 8/30/19 11:29  8/3/19 11:31


 


 


 Pantoprazole


  (Protonix)  40 mg  ACBREAKFAST


 ORAL


   8/1/19 06:30


 8/31/19 06:29  8/3/19 06:56


 


 


 Prednisone


  (predniSONE)  10 mg  DAILY


 ORAL


   8/1/19 09:00


 8/31/19 08:59  8/3/19 09:06


 


 


 Salmeterol


 Xinafoate/


 Fluticasone


  (Advair 250/50


 Diskus)  1 puffs  BID


 INH


   8/2/19 18:00


 9/1/19 17:59  8/3/19 08:33


 


 


 Sitagliptin


 Phosphate


  (Januvia)  25 mg  ACBREAKFAST


 ORAL


   8/1/19 06:30


 8/31/19 06:29  8/3/19 06:56


 


 


 Tramadol HCl


  (Ultram)  50 mg  Q6H  PRN


 ORAL


 Severe Pain (Pain Scale 7-10)  8/1/19 20:30


 8/8/19 20:29  8/2/19 17:28


 

















Eldon Gould MD Aug 3, 2019 16:52

## 2019-08-03 NOTE — NUR
To CASE MANGER:

RAYMOND NOTE: 

Carol Quintana (Daughter)  wish to speak to  in regards to their mother discharge 
planning to SNF.

[phone]

Carlos Quintana (son) [phone].

## 2019-08-03 NOTE — NUR
CASE MANAGEMENT: REVIEW



SI: CHF . BACTERIURIA

T 97.2  RR 16 /57 % NC/2L

WBC 11.7 BUN 47 CR 1.4 GLUCOSE 174 







IS: PROTONIX PO QD

STARLIX PO QD

PLAVIX PO QD

LEVEMIR SUBQ BID 

ADVAIR INH BID 

ELIQUIS PO BID 





***TELEMETRY UNIT STATUS***

DCP: PATIENT IS FROM HOME

## 2019-08-04 VITALS — SYSTOLIC BLOOD PRESSURE: 127 MMHG | DIASTOLIC BLOOD PRESSURE: 57 MMHG

## 2019-08-04 VITALS — DIASTOLIC BLOOD PRESSURE: 57 MMHG | SYSTOLIC BLOOD PRESSURE: 101 MMHG

## 2019-08-04 VITALS — DIASTOLIC BLOOD PRESSURE: 70 MMHG | SYSTOLIC BLOOD PRESSURE: 125 MMHG

## 2019-08-04 VITALS — DIASTOLIC BLOOD PRESSURE: 77 MMHG | SYSTOLIC BLOOD PRESSURE: 109 MMHG

## 2019-08-04 VITALS — SYSTOLIC BLOOD PRESSURE: 100 MMHG | DIASTOLIC BLOOD PRESSURE: 69 MMHG

## 2019-08-04 VITALS — SYSTOLIC BLOOD PRESSURE: 126 MMHG | DIASTOLIC BLOOD PRESSURE: 66 MMHG

## 2019-08-04 LAB
ADD MANUAL DIFF: NO
ALBUMIN SERPL-MCNC: 2.3 G/DL (ref 3.4–5)
ALBUMIN/GLOB SERPL: 0.6 {RATIO} (ref 1–2.7)
ALP SERPL-CCNC: 103 U/L (ref 46–116)
ALT SERPL-CCNC: 14 U/L (ref 12–78)
ANION GAP SERPL CALC-SCNC: 4 MMOL/L (ref 5–15)
AST SERPL-CCNC: 15 U/L (ref 15–37)
BASOPHILS NFR BLD AUTO: 0.4 % (ref 0–2)
BILIRUB SERPL-MCNC: 0.5 MG/DL (ref 0.2–1)
BUN SERPL-MCNC: 37 MG/DL (ref 7–18)
CALCIUM SERPL-MCNC: 9 MG/DL (ref 8.5–10.1)
CHLORIDE SERPL-SCNC: 99 MMOL/L (ref 98–107)
CO2 SERPL-SCNC: 34 MMOL/L (ref 21–32)
CREAT SERPL-MCNC: 1.3 MG/DL (ref 0.55–1.3)
EOSINOPHIL NFR BLD AUTO: 0.6 % (ref 0–3)
ERYTHROCYTE [DISTWIDTH] IN BLOOD BY AUTOMATED COUNT: 15.8 % (ref 11.6–14.8)
GLOBULIN SER-MCNC: 4 G/DL
HCT VFR BLD CALC: 34.6 % (ref 37–47)
HGB BLD-MCNC: 10.6 G/DL (ref 12–16)
LYMPHOCYTES NFR BLD AUTO: 5.9 % (ref 20–45)
MCV RBC AUTO: 91 FL (ref 80–99)
MONOCYTES NFR BLD AUTO: 8.3 % (ref 1–10)
NEUTROPHILS NFR BLD AUTO: 84.7 % (ref 45–75)
PLATELET # BLD: 406 K/UL (ref 150–450)
POTASSIUM SERPL-SCNC: 5.6 MMOL/L (ref 3.5–5.1)
RBC # BLD AUTO: 3.79 M/UL (ref 4.2–5.4)
SODIUM SERPL-SCNC: 137 MMOL/L (ref 136–145)
WBC # BLD AUTO: 15.2 K/UL (ref 4.8–10.8)

## 2019-08-04 RX ADMIN — INSULIN DETEMIR SCH UNITS: 100 INJECTION, SOLUTION SUBCUTANEOUS at 17:28

## 2019-08-04 RX ADMIN — DOCUSATE SODIUM SCH MG: 100 CAPSULE, LIQUID FILLED ORAL at 08:32

## 2019-08-04 RX ADMIN — METOPROLOL TARTRATE SCH MG: 50 TABLET, FILM COATED ORAL at 17:27

## 2019-08-04 RX ADMIN — INSULIN ASPART SCH UNITS: 100 INJECTION, SOLUTION INTRAVENOUS; SUBCUTANEOUS at 06:36

## 2019-08-04 RX ADMIN — HYDRALAZINE HYDROCHLORIDE SCH MG: 50 TABLET ORAL at 22:00

## 2019-08-04 RX ADMIN — METOPROLOL TARTRATE SCH MG: 50 TABLET, FILM COATED ORAL at 12:12

## 2019-08-04 RX ADMIN — METFORMIN HYDROCHLORIDE SCH MG: 500 TABLET ORAL at 08:32

## 2019-08-04 RX ADMIN — HYDRALAZINE HYDROCHLORIDE SCH MG: 50 TABLET ORAL at 14:31

## 2019-08-04 RX ADMIN — INSULIN DETEMIR SCH UNITS: 100 INJECTION, SOLUTION SUBCUTANEOUS at 09:09

## 2019-08-04 RX ADMIN — ATORVASTATIN CALCIUM SCH MG: 80 TABLET, FILM COATED ORAL at 20:35

## 2019-08-04 RX ADMIN — INSULIN ASPART SCH UNITS: 100 INJECTION, SOLUTION INTRAVENOUS; SUBCUTANEOUS at 12:14

## 2019-08-04 RX ADMIN — HYDRALAZINE HYDROCHLORIDE SCH MG: 50 TABLET ORAL at 05:37

## 2019-08-04 RX ADMIN — INSULIN ASPART SCH UNITS: 100 INJECTION, SOLUTION INTRAVENOUS; SUBCUTANEOUS at 20:37

## 2019-08-04 RX ADMIN — APIXABAN SCH MG: 5 TABLET, FILM COATED ORAL at 08:33

## 2019-08-04 RX ADMIN — ASPIRIN 81 MG SCH MG: 81 TABLET ORAL at 08:32

## 2019-08-04 RX ADMIN — METOPROLOL TARTRATE SCH MG: 50 TABLET, FILM COATED ORAL at 05:36

## 2019-08-04 RX ADMIN — FLUTICASONE PROPIONATE AND SALMETEROL SCH PUFFS: 50; 250 POWDER RESPIRATORY (INHALATION) at 19:40

## 2019-08-04 RX ADMIN — FLUTICASONE PROPIONATE AND SALMETEROL SCH PUFFS: 50; 250 POWDER RESPIRATORY (INHALATION) at 09:18

## 2019-08-04 RX ADMIN — DOCUSATE SODIUM SCH MG: 100 CAPSULE, LIQUID FILLED ORAL at 17:26

## 2019-08-04 RX ADMIN — APIXABAN SCH MG: 5 TABLET, FILM COATED ORAL at 17:26

## 2019-08-04 RX ADMIN — SITAGLIPTIN SCH MG: 50 TABLET, FILM COATED ORAL at 05:37

## 2019-08-04 RX ADMIN — METOPROLOL TARTRATE SCH MG: 50 TABLET, FILM COATED ORAL at 01:18

## 2019-08-04 RX ADMIN — METFORMIN HYDROCHLORIDE SCH MG: 500 TABLET ORAL at 17:26

## 2019-08-04 RX ADMIN — ACETAMINOPHEN PRN MG: 160 SOLUTION ORAL at 08:33

## 2019-08-04 RX ADMIN — METOPROLOL TARTRATE SCH MG: 50 TABLET, FILM COATED ORAL at 23:30

## 2019-08-04 RX ADMIN — INSULIN ASPART SCH UNITS: 100 INJECTION, SOLUTION INTRAVENOUS; SUBCUTANEOUS at 17:27

## 2019-08-04 NOTE — PROGRESS NOTE
DATE:  08/03/2019

CARDIOLOGY PROGRESS NOTE



SUBJECTIVE:  The patient is withdrawn.  Occasionally lethargic.  No

shortness of breath.  Not on BiPAP presently.



OBJECTIVE:

VITAL SIGNS:  Blood pressure 121/57, pulse 75, and respiratory rate 18.

LUNGS:  Diminished breath sounds.  No wheezes.

CARDIAC:  Regular rhythm and rate.  Normal S1 and S2 with a 1/6 systolic

apical murmur.

ABDOMEN:  Soft.

EXTREMITIES:  Trace edema.



LABORATORY DATA:  White count 11.7 and hemoglobin 12.  Sodium 137,

potassium 5.1, BUN 47, and creatinine 1.4.



IMPRESSION:

1. Respiratory failure.

2. Healthcare-acquired pneumonia.

3. Paroxysmal atrial fibrillation, on apixaban.

4. Acute on chronic diastolic congestive heart failure.



PLAN:

1. Decrease the dose of amlodipine in view of low range blood

pressure.

2. Diuresis to be determined based on clinical parameters.

3. Continue full anticoagulation.

4. Steroid taper.

5. Await the venous duplex study.









  ______________________________________________

  Keon Coker M.D.





DR:  VALDEZ

D:  08/03/2019 23:39

T:  08/04/2019 00:19

JOB#:  [id number]

CC:

## 2019-08-04 NOTE — NUR
RESPIRATORY NOTE:



Placed pt on bipap on settings of 12/5 FIO2 28%. Foam tape around pt's facial mask to 
prevent redness/breakdown. Bipap is plugged into red outlet. Will continue to monitor pt's 
progress.

## 2019-08-04 NOTE — NUR
NURSE NOTES:

RECEIVED PATIENT RESTING IN BED, NO COMPLAINTS OF PAIN AT THIS TIME. FALL PRECAUTIONS IN 
PLACE: CALL LIGHT AND BEDSIDE TABLE WITHIN REACH, BED IN LOW POSITION AND BED ALARM ON. 
FAMILY AT BEDSIDE. PLAN OF CARE REVIEWED.

## 2019-08-04 NOTE — DIAGNOSTIC IMAGING REPORT
EXAM:

  XR Chest, 1 View

 

CLINICAL HISTORY:

  INFECT

 

TECHNIQUE:

  Frontal view of the chest.

 

COMPARISON:

  Chest x-ray, 8/2/19 754

 

FINDINGS:

  Lungs:  Hypoventilatory lungs. Interval worsening mild interstitial and 

vascular congestion.  

  Pleural space:  Unremarkable.  No pneumothorax.

  Heart:  Cardiomegaly.

  Mediastinum:  Unremarkable.

  Bones/joints:  Unremarkable.

  Tubes, lines and devices:  Cardiac pacemaker.

 

IMPRESSION:     

  Hypoventilatory lungs. Interval worsening mild interstitial and 

vascular congestion.

## 2019-08-04 NOTE — NUR
NURSE NOTES:

Pts potassium is 5.6. Called Dr. Main and informed her. Orders given and carried out. 
Will continue to monitor.

## 2019-08-04 NOTE — PULMONOLOGY PROGRESS NOTE
Assessment/Plan


Assessment/Plan


1. Congestive heart failure.


2. Diabetes, out of control.


3. Altered mental status due to CO2 narcosis


4. COPD with resp failure


5. Sleep apnea.


6. Atrial fibrillation, not on anticoagulants.


7. Coronary heart disease.


8. Aortic atherosclerosis.


9. Old cerebral infarct





resp failure, mild hypercarbia


NO diamox; it will make her more acidotic


steroid PO; this is causing high WBC


DC plan to snf if duplex ok, not ordered so it was placed today


disc w dtr, RN





Subjective


Neurologic:  Reports: headache


Allergies:  


Coded Allergies:  


     SULFA (SULFONAMIDE ANTIBIOTICS) (Unverified  Allergy, Unknown, 7/26/19)


Uncoded Allergies:  


     SULFA (Allergy, Unknown, 7/25/19)


Subjective


lethargic


but awake complains of HA today, had some Nausea yesterday


 not on bipap


no distress


tolerating po


not getting oob


on o2


no fever





Objective





Last 24 Hour Vital Signs








  Date Time  Temp Pulse Resp B/P (MAP) Pulse Ox O2 Delivery O2 Flow Rate FiO2


 


8/4/19 07:41     96 Nasal Cannula 2.0 28


 


8/4/19 05:37    126/66    


 


8/4/19 05:36  82  126/66    


 


8/4/19 04:00  82      


 


8/4/19 04:00 98.9 68 17 126/66 (86) 95   


 


8/4/19 01:18  77  110/70    


 


8/4/19 00:00  85      


 


8/4/19 00:00 98.6 79 18 109/77 (88) 99   


 


8/3/19 23:05  77 15  96 Facial  28


 


8/3/19 22:00      Nasal Cannula 2.0 





      Nasal Cannula 2.0 





      Nasal Cannula 2.0 


 


8/3/19 21:24    102/65    


 


8/3/19 21:08  72 14  97 Facial  28


 


8/3/19 20:00  95      


 


8/3/19 20:00 98.3 76 16 102/65 (77) 98   


 


8/3/19 18:44     94 Bi-Pap  28


 


8/3/19 18:43  74 16  95 Bi-Pap  28


 


8/3/19 18:43  74 13  94 Facial  28


 


8/3/19 18:39  79 13  94 Bi-Pap  28


 


8/3/19 16:53  101 13  97 Facial  28


 


8/3/19 16:00 98.2 82 16 132/61 (84) 93   


 


8/3/19 15:48  87      


 


8/3/19 14:12    110/65    


 


8/3/19 12:00 97.2 84 16 128/57 (80) 100   


 


8/3/19 11:56  82      


 


8/3/19 11:32  103  127/57    


 


8/3/19 09:06  62  121/57    


 


8/3/19 09:00      Nasal Cannula 2.0 





      Nasal Cannula 2.0 





      Nasal Cannula 2.0 


 


8/3/19 08:34  62 16  96 Nasal Cannula 2.0 28


 


8/3/19 08:34  62 16  96 Nasal Cannula 2.0 28

















Intake and Output  


 


 8/3/19 8/4/19





 18:59 06:59


 


Intake Total 440 ml 


 


Output Total 600 ml 625 ml


 


Balance -160 ml -625 ml


 


  


 


Intake Oral 440 ml 


 


Output Urine Total 600 ml 625 ml








General Appearance:  WD/WN


Respiratory/Chest:  rhonchi


Cardiovascular:  normal rate, regular rhythm


Neurologic/Psychiatric:  responsive, disoriented





Microbiology








 Date/Time


Source Procedure


Growth Status


 


 


 8/1/19 18:00


External Cath Urine Culture - Final


Candida Albicans Complete








Laboratory Tests


8/4/19 06:36: 


White Blood Count [Pending], Red Blood Count [Pending], Hemoglobin [Pending], 

Hematocrit [Pending], Mean Corpuscular Volume [Pending], Mean Corpuscular 

Hemoglobin [Pending], Mean Corpuscular Hemoglobin Concent [Pending], Red Cell 

Distribution Width [Pending], Platelet Count [Pending], Mean Platelet Volume [

Pending], Neutrophils (%) (Auto) [Pending], Lymphocytes (%) (Auto) [Pending], 

Monocytes (%) (Auto) [Pending], Eosinophils (%) (Auto) [Pending], Basophils (%) 

(Auto) [Pending], Sodium Level [Pending], Potassium Level [Pending], Chloride 

Level [Pending], Carbon Dioxide Level [Pending], Blood Urea Nitrogen [Pending], 

Creatinine [Pending], Estimat Glomerular Filtration Rate [Pending], Glucose 

Level [Pending], Hemoglobin A1c [Pending], Calcium Level [Pending], Total 

Bilirubin [Pending], Aspartate Amino Transf (AST/SGOT) [Pending], Alanine 

Aminotransferase (ALT/SGPT) [Pending], Alkaline Phosphatase [Pending], Total 

Protein [Pending], Albumin [Pending], Globulin [Pending]





Current Medications








 Medications


  (Trade)  Dose


 Ordered  Sig/Michele


 Route


 PRN Reason  Start Time


 Stop Time Status Last Admin


Dose Admin


 


 Acetaminophen


  (Tylenol)  650 mg  Q6H  PRN


 ORAL


 Mild Pain/Temp > 100.5  7/30/19 18:15


 8/26/19 18:14  8/1/19 18:24


 


 


 Amlodipine


 Besylate


  (Norvasc)  5 mg  DAILY


 ORAL


   7/31/19 09:00


 8/26/19 08:59  8/3/19 09:06


 


 


 Apixaban


  (Eliquis)  5 mg  BID


 ORAL


   7/30/19 18:00


 8/25/19 08:59  8/3/19 09:05


 


 


 Aspirin


  (ASA)  81 mg  DAILY


 ORAL


   7/31/19 09:00


 8/25/19 08:59  8/3/19 09:06


 


 


 Atorvastatin


 Calcium


  (Lipitor)  80 mg  BEDTIME


 ORAL


   7/30/19 21:00


 8/25/19 20:59  8/3/19 21:19


 


 


 Clopidogrel


 Bisulfate


  (Plavix)  75 mg  DAILY


 ORAL


   7/31/19 09:00


 8/25/19 08:59  8/3/19 09:06


 


 


 Dextrose


  (Dextrose 50%)  25 ml  Q30M  PRN


 IV


 Hypoglycemia  7/30/19 18:15


 8/29/19 07:14   


 


 


 Dextrose


  (Dextrose 50%)  50 ml  Q30M  PRN


 IV


 Hypoglycemia  7/30/19 18:15


 8/29/19 07:14   


 


 


 Docusate Sodium


  (Colace)  100 mg  TWICE A  DAY


 ORAL


   8/2/19 09:00


 9/1/19 08:59  8/3/19 09:06


 


 


 Hydralazine HCl


  (Apresoline)  50 mg  EVERY 8  HOURS


 ORAL


   7/30/19 22:00


 8/25/19 05:59  8/4/19 05:37


 


 


 Insulin Aspart


  (NovoLOG)    BEFORE MEALS AND  HS


 SUBQ


   7/30/19 21:00


 8/29/19 11:29  8/4/19 06:36


 


 


 Insulin Detemir


  (Levemir)  12 units  BID


 SUBQ


   7/31/19 09:00


 8/25/19 20:59  8/3/19 17:45


 


 


 Metformin HCl


  (Glucophage)  500 mg  BID


 ORAL


   8/1/19 09:00


 8/31/19 08:59  8/3/19 09:06


 


 


 Metoprolol


 Tartrate


  (Lopressor)  50 mg  Q6HR


 ORAL


   7/30/19 18:00


 8/26/19 18:29  8/4/19 05:36


 


 


 Nateglinide


  (Starlix)  120 mg  TIAC


 ORAL


   7/31/19 11:30


 8/30/19 11:29  8/4/19 05:37


 


 


 Ondansetron HCl


  (Zofran)  4 mg  Q4H  PRN


 IVP


 Nausea & Vomiting  8/3/19 17:15


 9/2/19 17:14  8/3/19 17:38


 


 


 Pantoprazole


  (Protonix)  40 mg  ACBREAKFAST


 ORAL


   8/1/19 06:30


 8/31/19 06:29  8/4/19 05:37


 


 


 Piperacillin Sod/


 Tazobactam Sod


 3.375 gm/Dextrose  100 ml @ 


 25 mls/hr  EVERY 8  HOURS


 IVPB


   8/3/19 20:00


 8/8/19 19:59  8/4/19 05:35


 


 


 Prednisone


  (predniSONE)  10 mg  DAILY


 ORAL


   8/1/19 09:00


 8/31/19 08:59  8/3/19 09:06


 


 


 Salmeterol


 Xinafoate/


 Fluticasone


  (Advair 250/50


 Diskus)  1 puffs  BID


 INH


   8/2/19 18:00


 9/1/19 17:59  8/3/19 18:39


 


 


 Sitagliptin


 Phosphate


  (Januvia)  50 mg  ACBREAKFAST


 ORAL


   8/4/19 06:30


 9/3/19 06:29  8/4/19 05:37


 


 


 Tramadol HCl


  (Ultram)  50 mg  Q6H  PRN


 ORAL


 Severe Pain (Pain Scale 7-10)  8/1/19 20:30


 8/8/19 20:29  8/2/19 17:28


 


 


 Vancomycin HCl


  (Vanco rx to


 dose)  1 ea  DAILY  PRN


 MISC


 Per rx protocol  8/3/19 17:00


 9/2/19 16:59   


 


 


 Vancomycin HCl


 500 mg/Dextrose  110 ml @ 


 110 mls/hr  Q24H


 IVPB


   8/4/19 18:00


 8/9/19 17:59   


 

















Magdalena Main DO Aug 4, 2019 08:13

## 2019-08-04 NOTE — NUR
NURSE NOTES:

Received report from Long Gomez. Pt is sitting in bed. Pt is AAOX 2. Bed is in lowest 
position, side rails up X2, and call light is within reach. Will continue to monitor.

## 2019-08-04 NOTE — NUR
CASE MANAGEMENT: REVIEW



SI: CHF . BACTERIURIA

T 97.1 HR 62 RR 18 /70 SAT 96% NC/2L

WBC 15.2 H/H 10.6/34.6 K 5.6 GLUCOSE 200 







IS: PROTONIX PO QD

STARLIX PO QD

PLAVIX PO QD

LEVEMIR SUBQ BID 

ADVAIR INH BID 

ELIQUIS PO BID 





***TELEMETRY UNIT STATUS***

DCP: PATIENT IS FROM HOME

## 2019-08-04 NOTE — PROGRESS NOTE
DATE:  08/02/2019

This is a late entry.



SUBJECTIVE:  The case was discussed with the pulmonologist.  Diamox was

discontinued to avoid acidosis.  The patient remains intermittently on

BiPAP support.



OBJECTIVE:

VITAL SIGNS:  Blood pressure 114/74, pulse 72, and respirations 18.

Afebrile.

LUNGS:  Diminished breath sounds.

CARDIAC:  Regular rhythm and rate.  Normal S1, S2 with a 1/6 systolic

murmur at apex.

ABDOMEN:  Soft.

EXTREMITIES:  Trace dependent edema noted.



DIAGNOSTIC DATA:  Venous duplex scan pending.



IMPRESSION:

1. Chronic obstructive pulmonary disease exacerbation.

2. Status post respiratory failure.

3. Acute on chronic diastolic congestive heart failure.

4. Degenerative valve disease.

5. Paroxysmal atrial fibrillation.

6. Cerebrovascular disease.



PLAN:

1. Continue apixaban for cardioembolic prophylaxis.

2. Maintain current cardiovascular regimen.

3. If blood pressure parameters continue to decrease, doses of the

antihypertensive will have to be decreased as well.

4. Await venous duplex scan.









  ______________________________________________

  Keon Coker M.D.





DR:  DELIA

D:  08/03/2019 23:34

T:  08/04/2019 00:07

JOB#:  [id number]

CC:

## 2019-08-04 NOTE — GERIATRIC MEDICINE PROG NOTE
DATE:  08/03/2019





SUBJECTIVE:  The patient is currently improved, with diabetes better control.



OBJECTIVE:

VITAL SIGNS:  Blood pressure 126/76, pulse 96, and respiratory rate 18

      RESP:[facility]_.



LABORATORY:  Glucose _170_.



ASSESSMENT:  Diabetes Mellitus Stable_.



PLAN:  sliding [place] Lispro QID ac an dHYS..  Hemoglobin A1 ordered in a.m.









  ______________________________________________

  Yakov Giron M.D.





DR:  MARIIA

D:  08/03/2019 21:33

T:  08/04/2019 01:59

JOB#:  [id number]

CC:



SANTOS

## 2019-08-04 NOTE — NUR
NURSE NOTES:



PATIENT KEPT CLEAN AND DRY, PERINEAL CARE GIVEN, LINEN CHANGED. PATIENT TURNED AND 
REPOSITIONED.

## 2019-08-05 VITALS — SYSTOLIC BLOOD PRESSURE: 132 MMHG | DIASTOLIC BLOOD PRESSURE: 77 MMHG

## 2019-08-05 VITALS — DIASTOLIC BLOOD PRESSURE: 51 MMHG | SYSTOLIC BLOOD PRESSURE: 103 MMHG

## 2019-08-05 VITALS — SYSTOLIC BLOOD PRESSURE: 144 MMHG | DIASTOLIC BLOOD PRESSURE: 76 MMHG

## 2019-08-05 VITALS — SYSTOLIC BLOOD PRESSURE: 151 MMHG | DIASTOLIC BLOOD PRESSURE: 71 MMHG

## 2019-08-05 VITALS — DIASTOLIC BLOOD PRESSURE: 100 MMHG | SYSTOLIC BLOOD PRESSURE: 143 MMHG

## 2019-08-05 VITALS — SYSTOLIC BLOOD PRESSURE: 188 MMHG | DIASTOLIC BLOOD PRESSURE: 77 MMHG

## 2019-08-05 LAB
ADD MANUAL DIFF: NO
ALBUMIN SERPL-MCNC: 2.4 G/DL (ref 3.4–5)
ALBUMIN/GLOB SERPL: 0.6 {RATIO} (ref 1–2.7)
ALP SERPL-CCNC: 103 U/L (ref 46–116)
ALT SERPL-CCNC: 13 U/L (ref 12–78)
ANION GAP SERPL CALC-SCNC: 3 MMOL/L (ref 5–15)
AST SERPL-CCNC: 14 U/L (ref 15–37)
BASOPHILS NFR BLD AUTO: 0.6 % (ref 0–2)
BILIRUB SERPL-MCNC: 0.4 MG/DL (ref 0.2–1)
BUN SERPL-MCNC: 35 MG/DL (ref 7–18)
CALCIUM SERPL-MCNC: 8.8 MG/DL (ref 8.5–10.1)
CHLORIDE SERPL-SCNC: 99 MMOL/L (ref 98–107)
CO2 SERPL-SCNC: 36 MMOL/L (ref 21–32)
CREAT SERPL-MCNC: 1.4 MG/DL (ref 0.55–1.3)
EOSINOPHIL NFR BLD AUTO: 2.1 % (ref 0–3)
ERYTHROCYTE [DISTWIDTH] IN BLOOD BY AUTOMATED COUNT: 15.5 % (ref 11.6–14.8)
GLOBULIN SER-MCNC: 3.9 G/DL
HCT VFR BLD CALC: 32.1 % (ref 37–47)
HGB BLD-MCNC: 9.9 G/DL (ref 12–16)
LYMPHOCYTES NFR BLD AUTO: 15 % (ref 20–45)
MCV RBC AUTO: 91 FL (ref 80–99)
MONOCYTES NFR BLD AUTO: 11 % (ref 1–10)
NEUTROPHILS NFR BLD AUTO: 71.4 % (ref 45–75)
PLATELET # BLD: 408 K/UL (ref 150–450)
POTASSIUM SERPL-SCNC: 3.9 MMOL/L (ref 3.5–5.1)
RBC # BLD AUTO: 3.54 M/UL (ref 4.2–5.4)
SODIUM SERPL-SCNC: 138 MMOL/L (ref 136–145)
WBC # BLD AUTO: 10.9 K/UL (ref 4.8–10.8)

## 2019-08-05 RX ADMIN — INSULIN ASPART SCH UNITS: 100 INJECTION, SOLUTION INTRAVENOUS; SUBCUTANEOUS at 12:49

## 2019-08-05 RX ADMIN — METOPROLOL TARTRATE SCH MG: 50 TABLET, FILM COATED ORAL at 18:03

## 2019-08-05 RX ADMIN — INSULIN ASPART SCH UNITS: 100 INJECTION, SOLUTION INTRAVENOUS; SUBCUTANEOUS at 22:13

## 2019-08-05 RX ADMIN — FLUCONAZOLE SCH MG: 100 TABLET ORAL at 08:48

## 2019-08-05 RX ADMIN — ATORVASTATIN CALCIUM SCH MG: 80 TABLET, FILM COATED ORAL at 22:09

## 2019-08-05 RX ADMIN — SITAGLIPTIN SCH MG: 50 TABLET, FILM COATED ORAL at 06:02

## 2019-08-05 RX ADMIN — INSULIN ASPART SCH UNITS: 100 INJECTION, SOLUTION INTRAVENOUS; SUBCUTANEOUS at 18:06

## 2019-08-05 RX ADMIN — INSULIN DETEMIR SCH UNITS: 100 INJECTION, SOLUTION SUBCUTANEOUS at 18:05

## 2019-08-05 RX ADMIN — METOPROLOL TARTRATE SCH MG: 50 TABLET, FILM COATED ORAL at 12:46

## 2019-08-05 RX ADMIN — INSULIN DETEMIR SCH UNITS: 100 INJECTION, SOLUTION SUBCUTANEOUS at 08:51

## 2019-08-05 RX ADMIN — FLUTICASONE PROPIONATE AND SALMETEROL SCH PUFFS: 50; 250 POWDER RESPIRATORY (INHALATION) at 19:43

## 2019-08-05 RX ADMIN — FLUTICASONE PROPIONATE AND SALMETEROL SCH PUFFS: 50; 250 POWDER RESPIRATORY (INHALATION) at 08:39

## 2019-08-05 RX ADMIN — HYDRALAZINE HYDROCHLORIDE SCH MG: 50 TABLET ORAL at 14:00

## 2019-08-05 RX ADMIN — METOPROLOL TARTRATE SCH MG: 50 TABLET, FILM COATED ORAL at 05:36

## 2019-08-05 RX ADMIN — APIXABAN SCH MG: 5 TABLET, FILM COATED ORAL at 08:49

## 2019-08-05 RX ADMIN — HYDRALAZINE HYDROCHLORIDE SCH MG: 50 TABLET ORAL at 22:11

## 2019-08-05 RX ADMIN — INSULIN ASPART SCH UNITS: 100 INJECTION, SOLUTION INTRAVENOUS; SUBCUTANEOUS at 06:04

## 2019-08-05 RX ADMIN — METFORMIN HYDROCHLORIDE SCH MG: 500 TABLET ORAL at 08:48

## 2019-08-05 RX ADMIN — METFORMIN HYDROCHLORIDE SCH MG: 500 TABLET ORAL at 18:03

## 2019-08-05 RX ADMIN — APIXABAN SCH MG: 5 TABLET, FILM COATED ORAL at 18:04

## 2019-08-05 RX ADMIN — DOCUSATE SODIUM SCH MG: 100 CAPSULE, LIQUID FILLED ORAL at 18:04

## 2019-08-05 RX ADMIN — DOCUSATE SODIUM SCH MG: 100 CAPSULE, LIQUID FILLED ORAL at 08:49

## 2019-08-05 RX ADMIN — ASPIRIN 81 MG SCH MG: 81 TABLET ORAL at 08:49

## 2019-08-05 RX ADMIN — HYDRALAZINE HYDROCHLORIDE SCH MG: 50 TABLET ORAL at 05:36

## 2019-08-05 NOTE — NUR
NURSE NOTES:

Received report from NIKA Villareal RN. Patient in bed AAO X2 with family at bedside, Amharic 
speaking with some English. No complaints of acute pain or distress at this time. Placed on 
continuous cardiac monitoring per protocol. On Bipap HS and PRN, tolerates well. Safety 
precaution in place; siderails X3 up, call light within reach, bed in lowest position, 
brakes and alarm on at all times. 



DC order active per MD.

## 2019-08-05 NOTE — PULMONOLOGY PROGRESS NOTE
Assessment/Plan


Assessment/Plan


1. Congestive heart failure.


2. Diabetes, out of control.


3. Altered mental status due to CO2 narcosis


4. COPD with resp failure


5. Sleep apnea.


6. Atrial fibrillation, not on anticoagulants.


7. Coronary heart disease.


8. Aortic atherosclerosis.


9. Old cerebral infarct





resp failure, mild hypercarbia, stable


steroid PO


DC plan to snf w BiPAP


disc w RN





Subjective


ROS Limited/Unobtainable:  Yes


Allergies:  


Coded Allergies:  


     SULFA (SULFONAMIDE ANTIBIOTICS) (Unverified  Allergy, Unknown, 7/26/19)


Uncoded Allergies:  


     SULFA (Allergy, Unknown, 7/25/19)





Objective





Last 24 Hour Vital Signs








  Date Time  Temp Pulse Resp B/P (MAP) Pulse Ox O2 Delivery O2 Flow Rate FiO2


 


8/5/19 12:46  59  143/62    


 


8/5/19 12:00 98.2 90 16 103/51 (68) 97   


 


8/5/19 12:00  86      


 


8/5/19 09:00      Nasal Cannula 2.0 





      Simple Mask 2.0 





      Nasal Cannula 2.0 


 


8/5/19 08:49  73  143/100    


 


8/5/19 08:39  94 18  97 Nasal Cannula 2.0 28


 


8/5/19 08:39  94 18  97 Nasal Cannula 2.0 28


 


8/5/19 08:00 98.6 73 16 143/100 (114) 98   


 


8/5/19 08:00  91      


 


8/5/19 07:00     97 Nasal Cannula 2.0 28


 


8/5/19 05:36  78  144/76    


 


8/5/19 05:36    144/76    


 


8/5/19 05:23  78 17  98 Facial  28


 


8/5/19 04:00 98.2 81 17 144/76 (98) 100   


 


8/5/19 04:00  88      


 


8/5/19 02:31  81 18  99 Facial  28


 


8/5/19 00:32  84 16  98 Facial  28


 


8/5/19 00:00 98.1 82 19 118/77 (91) 98   


 


8/5/19 00:00  99      


 


8/4/19 23:30  82  118/77    


 


8/4/19 23:08  85 17  97 Facial  28


 


8/4/19 22:00    101/57    


 


8/4/19 21:00      Nasal Cannula 2.0 





      Nasal Cannula 2.0 





      Nasal Cannula 2.0 


 


8/4/19 20:00  83      


 


8/4/19 20:00 98.0 87 17 101/57 (72) 92   


 


8/4/19 19:42  88 18  96 Nasal Cannula 2.0 28


 


8/4/19 19:42     96 Nasal Cannula 2.0 28


 


8/4/19 19:40  88 18  96 Nasal Cannula 2.0 28


 


8/4/19 17:27  82  125/70    


 


8/4/19 16:00  82      


 


8/4/19 16:00 97.1 62 18 125/70 (88) 97   

















Intake and Output  


 


 8/4/19 8/5/19





 18:59 06:59


 


Intake Total 480 ml 485 ml


 


Output Total 450 ml 600 ml


 


Balance 30 ml -115 ml


 


  


 


Intake Oral 480 ml 360 ml


 


IV Total  125 ml


 


Output Urine Total 450 ml 600 ml


 


# Voids  1








General Appearance:  no acute distress


Respiratory/Chest:  lungs clear


Cardiovascular:  normal rate





Microbiology








 Date/Time


Source Procedure


Growth Status


 


 


 8/4/19 07:30


Sputum Induced Gram Stain - Final Resulted


 


 8/4/19 07:30


Sputum Induced Sputum Culture


Pending Resulted








Laboratory Tests


8/5/19 06:06: 


White Blood Count 10.9H, Red Blood Count 3.54L, Hemoglobin 9.9L, Hematocrit 

32.1L, Mean Corpuscular Volume 91, Mean Corpuscular Hemoglobin 28.0, Mean 

Corpuscular Hemoglobin Concent 30.9L, Red Cell Distribution Width 15.5H, 

Platelet Count 408, Mean Platelet Volume 6.0L, Neutrophils (%) (Auto) 71.4, 

Lymphocytes (%) (Auto) 15.0L, Monocytes (%) (Auto) 11.0H, Eosinophils (%) (Auto

) 2.1, Basophils (%) (Auto) 0.6, Sodium Level 138, Potassium Level 3.9, 

Chloride Level 99, Carbon Dioxide Level 36H, Anion Gap 3L, Blood Urea Nitrogen 

35H, Creatinine 1.4H, Estimat Glomerular Filtration Rate , Glucose Level 165H, 

Calcium Level 8.8, Total Bilirubin 0.4, Aspartate Amino Transf (AST/SGOT) 14L, 

Alanine Aminotransferase (ALT/SGPT) 13, Alkaline Phosphatase 103, Pro-B-Type 

Natriuretic Peptide 4464H, Total Protein 6.3L, Albumin 2.4L, Globulin 3.9, 

Albumin/Globulin Ratio 0.6L





Current Medications








 Medications


  (Trade)  Dose


 Ordered  Sig/Michele


 Route


 PRN Reason  Start Time


 Stop Time Status Last Admin


Dose Admin


 


 Acetaminophen


  (Tylenol)  650 mg  Q6H  PRN


 ORAL


 Mild Pain/Temp > 100.5  7/30/19 18:15


 8/26/19 18:14  8/4/19 08:33


 


 


 Amlodipine


 Besylate


  (Norvasc)  5 mg  DAILY


 ORAL


   7/31/19 09:00


 8/26/19 08:59  8/5/19 08:49


 


 


 Apixaban


  (Eliquis)  5 mg  BID


 ORAL


   7/30/19 18:00


 8/25/19 08:59  8/5/19 08:49


 


 


 Aspirin


  (ASA)  81 mg  DAILY


 ORAL


   7/31/19 09:00


 8/25/19 08:59  8/5/19 08:49


 


 


 Atorvastatin


 Calcium


  (Lipitor)  80 mg  BEDTIME


 ORAL


   7/30/19 21:00


 8/25/19 20:59  8/4/19 20:35


 


 


 Dextrose


  (Dextrose 50%)  25 ml  Q30M  PRN


 IV


 Hypoglycemia  7/30/19 18:15


 8/29/19 07:14   


 


 


 Dextrose


  (Dextrose 50%)  50 ml  Q30M  PRN


 IV


 Hypoglycemia  7/30/19 18:15


 8/29/19 07:14   


 


 


 Docusate Sodium


  (Colace)  100 mg  TWICE A  DAY


 ORAL


   8/2/19 09:00


 9/1/19 08:59  8/5/19 08:49


 


 


 Fluconazole


  (Diflucan)  100 mg  DAILY


 ORAL


   8/5/19 09:00


 8/12/19 08:59  8/5/19 08:48


 


 


 Hydralazine HCl


  (Apresoline)  50 mg  EVERY 8  HOURS


 ORAL


   7/30/19 22:00


 8/25/19 05:59  8/5/19 05:36


 


 


 Insulin Aspart


  (NovoLOG)    BEFORE MEALS AND  HS


 SUBQ


   7/30/19 21:00


 8/29/19 11:29  8/5/19 12:49


 


 


 Insulin Detemir


  (Levemir)  12 units  BID


 SUBQ


   7/31/19 09:00


 8/25/19 20:59  8/5/19 08:51


 


 


 Metformin HCl


  (Glucophage)  500 mg  BID


 ORAL


   8/1/19 09:00


 8/31/19 08:59  8/5/19 08:48


 


 


 Metoprolol


 Tartrate


  (Lopressor)  50 mg  Q6HR


 ORAL


   7/30/19 18:00


 8/26/19 18:29  8/5/19 12:46


 


 


 Nateglinide


  (Starlix)  120 mg  TIAC


 ORAL


   7/31/19 11:30


 8/30/19 11:29  8/5/19 12:46


 


 


 Ondansetron HCl


  (Zofran)  4 mg  Q4H  PRN


 IVP


 Nausea & Vomiting  8/3/19 17:15


 9/2/19 17:14  8/3/19 17:38


 


 


 Pantoprazole


  (Protonix)  40 mg  ACBREAKFAST


 ORAL


   8/1/19 06:30


 8/31/19 06:29  8/5/19 06:02


 


 


 Prednisone


  (predniSONE)  10 mg  DAILY


 ORAL


   8/1/19 09:00


 8/31/19 08:59  8/5/19 08:48


 


 


 Salmeterol


 Xinafoate/


 Fluticasone


  (Advair 250/50


 Diskus)  1 puffs  BID


 INH


   8/2/19 18:00


 9/1/19 17:59  8/5/19 08:39


 


 


 Sitagliptin


 Phosphate


  (Januvia)  50 mg  ACBREAKFAST


 ORAL


   8/4/19 06:30


 9/3/19 06:29  8/5/19 06:02


 


 


 Tramadol HCl


  (Ultram)  50 mg  Q6H  PRN


 ORAL


 Severe Pain (Pain Scale 7-10)  8/1/19 20:30


 8/8/19 20:29  8/2/19 17:28


 

















Yakov Alva MD Aug 5, 2019 15:15

## 2019-08-05 NOTE — NUR
RD ASSESSMENT & RECOMMENDATIONS

SEE CARE ACTIVITY FOR COMPLETE ASSESSMENT



DAILY ESTIMATED NEEDS:

Needs based on Pulmonary, DM 58.9kg adj  

25-30  kcals/kg 

2584-5206  total kcals

1-1.5  g protein/kg

59-88  g total protein 

Fluid per MD





NUTRITION DIAGNOSIS:

1) Swallowing difficulty r/t respiratory distress as evidenced by s/p

intubation, now on NGT feeds.(INACTIVE)

2) Altered nutrition related lab values r/t clinical status as evidenced

by elev WBC (21.9-> 15.9), elev pH (7.686-> now wnl), elev BUN (66), elev

Creat (2.8-> 1.6), elev BG on adm now improved (424->151), A1C 10.0, elev

BNP (4464). (UPDATED)



CURRENT DIET:CCHO MED     





PO DIET RECOMMENDATIONS:

CCHO LOW / texture per SLP 





ADDITIONAL RECOMMENDATIONS:

1) Monitor tolerance to oral diet , % po intake-> add Glucerna daily 

2) Monitor lytes, BG daily  

3) On lasix, daily calibrated bed scale wts for accuracy  

4) SLP eval  upon extubation for appropriate texture 

     Per RN pt swallows pills without difficulty

## 2019-08-05 NOTE — NUR
*-* INSURANCE *-*



ALL CLINICALS AND REVIEWS HAVE BEEN FAXED TO:



[facility]

P: [phone]

F: [phone] (FAX CLINICALS)

## 2019-08-05 NOTE — NUR
NURSE NOTES:

Received report from GEN Correa. Pt is sitting up in bed. Pt appears confused. She states that 
she wants to be in a room in the front not in the back and that she has been waiting over 3 
hours. When asked to elaborate, she says the same thing. Bed is in lowest position, side 
rails up X2, and call light is within reach. WIll continue to monitor.

## 2019-08-05 NOTE — NUR
NURSE NOTES:

Spoke with DANET Alva MD. New orders received and carried out. Will continue plan of care.

## 2019-08-05 NOTE — NUR
RESPIRATORY NOTE:

Alert/awake pt on 28% Venturi Mask now placed on BiPAP for nightly use. Pt on BiPAP 12/5, 
back up rate 12, 28%. Pt is on a Facial Mask, skin intact, no redness/breakdowns noted. Foam 
tape applied on pt's nosebridge/cheeks/chin to prevent any mask irritations. B/S kami. 
diminished, nonproductive cough. BiPAP plugged into red outlet, alarms on & audible. Pt in 
no apparent distress at this time. Will continue plan of care.

## 2019-08-05 NOTE — NUR
NURSE NOTES:

Per Dr. Alva, ok to DC IV antibiotics. Also, He stated that he "will DC the patient to a 
rehab. He will not DC her home. If family does not want to send her to rehab, pt can leave 
AMA" Left message for Ngozi in case mgnt. WIll await call back.

## 2019-08-05 NOTE — PROGRESS NOTE
DATE:  08/04/2019

Cardiology Progress Note



SUBJECTIVE:  The patient has a headache.  She has not been out of bed much.

She is in no respiratory distress.  She has not been on BiPAP.  She is at

times withdrawn and lethargic.



PHYSICAL EXAMINATION:

VITAL SIGNS:  Blood pressure 126/66, pulse 82, respiratory rate 17.

Afebrile.  Saturation on 2 L nasal cannula 96%.

LUNGS:  Diminished breath sounds.  No wheezes.

HEART:  Regular rhythm rate.  Normal S1, S2 with a fourth heart sound.

ABDOMEN:  Soft.  No edema.



LABORATORY AND DIAGNOSTIC DATA:  Urine culture has Candida.  White count

15, potassium 5.6, BUN 37, creatinine 1.3.  Albumin 2.3.



IMPRESSION:

1. Fungal cystitis.

2. Status post respiratory failure.

3. Acute on chronic respiratory acidosis.

4. Conduction system disease of the heart.

5. Paroxysmal atrial fibrillation.

6. Hypertensive heart disease.

7. Chronic diastolic congestive heart failure.



PLAN:

1. Continue apixaban for cardioembolic prophylaxis.

2. Remove Renee catheter.

3. Start Diflucan.

4. Respiratory therapy per pulmonologist.

5. Awaiting venous duplex scan.

6. Titrate diabetic regimen.

7. Discontinue Plavix to decrease bleeding risk in this clinical

setting.









  ______________________________________________

  Keon Coker M.D. DR:  MARINE

D:  08/04/2019 23:35

T:  08/04/2019 23:42

JOB#:  [id number]

CC:

## 2019-08-05 NOTE — NUR
NURSE NOTES:

Spoke with Gayle from [facility] regarding patients BiPAP order to be transferred 
to [place] rehab upon DC. Explained that CM deals with insurance details and placement, will 
endorse in the morning to follow up.

## 2019-08-05 NOTE — INFECTIOUS DISEASES PROG NOTE
Assessment/Plan


Assessment/Plan





ASSESSMENT AND PLAN:





1. klebsiella uti, sepsis, chf > cap/aspiration pna/hcap, respiratory failure 

leukocytosis, fevers, recent iv steroids 





   - vancomycin and zosyn discontinued - s/p full tx for uti 


   - continue tx for chf


   - monitor labs and chest x-ray


   - chest x-ray with pvc


   - f/u urine culture with yeast - likely colonizer and would not treat 





2. Acute renal failure.


3. Anemia.


4. CHF.


5. Respiratory failure.


6. ICU care.


7. Diabetes.


8. Possible hypertension.


9. Blood sugar treatment per Endocrinology and primary.


10. Vent care per Dr. Alva and Dr. Main.


11. Falls.


12. Sick sinus syndrome, pacemaker.


13. Sleep apnea, COPD.


14. Anemia.


15. Diastolic heart disease.


16. Coronary artery disease.


17. CHF.


18. Dyslipidemia.


19. Neuropathy.


20. Obesity.


21. Vitamin D deficiency.


22. Allergies to sulfa drugs.


23. MAR is noted.


24. Case was discussed with RN.


25. Social history negative.


26. Family history noncontributory.


27. Continue treatment per primary consultants.





Subjective


Constitutional:  Reports: other - + breathing mask; Denies: fever


HEENT:  Reports: congestion


Respiratory:  Reports: shortness of breath


Cardiovascular:  Denies: chest pain


Gastrointestinal/Abdominal:  Denies: nausea, vomiting, diarrhea


Genitourinary:  Reports: other - no zaidi


Neurologic:  Denies: headache


Psychiatric:  Denies: depression


Skin:  Denies: rash


Hematologic:  Denies: bleeding


Musculoskeletal:  Denies: pain


Allergies:  


Coded Allergies:  


     SULFA (SULFONAMIDE ANTIBIOTICS) (Unverified  Allergy, Unknown, 7/26/19)


Uncoded Allergies:  


     SULFA (Allergy, Unknown, 7/25/19)





Objective


Vital Signs





Last 24 Hour Vital Signs








  Date Time  Temp Pulse Resp B/P (MAP) Pulse Ox O2 Delivery O2 Flow Rate FiO2


 


8/5/19 12:46  59  143/62    


 


8/5/19 12:00 98.2 90 16 103/51 (68) 97   


 


8/5/19 12:00  86      


 


8/5/19 09:00      Nasal Cannula 2.0 





      Simple Mask 2.0 





      Nasal Cannula 2.0 


 


8/5/19 08:49  73  143/100    


 


8/5/19 08:39  94 18  97 Nasal Cannula 2.0 28


 


8/5/19 08:39  94 18  97 Nasal Cannula 2.0 28


 


8/5/19 08:00 98.6 73 16 143/100 (114) 98   


 


8/5/19 08:00  91      


 


8/5/19 07:00     97 Nasal Cannula 2.0 28 8/5/19 05:36  78  144/76    


 


8/5/19 05:36    144/76    


 


8/5/19 05:23  78 17  98 Facial  28


 


8/5/19 04:00 98.2 81 17 144/76 (98) 100   


 


8/5/19 04:00  88      


 


8/5/19 02:31  81 18  99 Facial  28


 


8/5/19 00:32  84 16  98 Facial  28


 


8/5/19 00:00 98.1 82 19 118/77 (91) 98   


 


8/5/19 00:00  99      


 


8/4/19 23:30  82  118/77    


 


8/4/19 23:08  85 17  97 Facial  28


 


8/4/19 22:00    101/57    


 


8/4/19 21:00      Nasal Cannula 2.0 





      Nasal Cannula 2.0 





      Nasal Cannula 2.0 


 


8/4/19 20:00  83      


 


8/4/19 20:00 98.0 87 17 101/57 (72) 92   


 


8/4/19 19:42  88 18  96 Nasal Cannula 2.0 28 8/4/19 19:42     96 Nasal Cannula 2.0 28 8/4/19 19:40  88 18  96 Nasal Cannula 2.0 28 8/4/19 17:27  82  125/70    


 


8/4/19 16:00  82      


 


8/4/19 16:00 97.1 62 18 125/70 (88) 97   


 


8/4/19 14:31    100/69    








Height (Feet):  5


Height (Inches):  4.00


Weight (Pounds):  191


General Appearance:  no acute distress


HEENT:  normocephalic, atraumatic, anicteric


Respiratory/Chest:  crackles/rales, rhonchi - bilaterally


Cardiovascular:  normal rate, no gallop/murmur, no JVD


Abdomen:  normal bowel sounds, soft, non tender, no organomegaly, non distended


Genitourinary:  other - no zaidi


Extremities:  no cyanosis


Skin:  no rash


Neurologic/Psychiatric:  CNs II-XII grossly normal, alert, responsive


Lymphatic:  no neck adenopathy


Musculoskeletal:  no effusion


Objective





Chest x-ray - 7/30/19 - 





Comparison:  7/28/2019


 


A single view chest radiograph was obtained.


 


Findings:


 


Pulmonary vascular congestion suspected. Lung volumes are low. Patient has been


extubated. The left lung base is obscured. Pacemaker is noted. Bones are 

osteopenic.


 


IMPRESSION:


 


Suspected pulmonary vascular congestion


 


 





Chest x-ray - 8/2/19 - 





Procedure: XRAY Chest 1v


Indication: Dyspnea


 


Technique: One view of the chest


 


Comparison: 7/30/2019


 


Findings: There is a left chest bifocal pacemaker again demonstrated. There is


decreased retrocardiac consolidation. There is persistent mild interstitial


congestion. The heart is enlarged


 


Impression: Persistent mild interstitial congestion, over 3 days


 


Improved retrocardiac consolidation and possibly decreased left pleural fluid











Chest x-ray - 8/4/19 - 





 Frontal view of the chest.


 


COMPARISON:


 Chest x-ray, 8/2/19 754


 


FINDINGS:


  Lungs:  Hypoventilatory lungs. Interval worsening mild interstitial and 


vascular congestion.  


  Pleural space:  Unremarkable.  No pneumothorax.


  Heart:  Cardiomegaly.


  Mediastinum:  Unremarkable.


  Bones/joints:  Unremarkable.


  Tubes, lines and devices:  Cardiac pacemaker.


 


IMPRESSION:     


  Hypoventilatory lungs. Interval worsening mild interstitial and 


vascular congestion.





Microbiology








 Date/Time


Source Procedure


Growth Status


 


 


 7/28/19 09:15


Blood Blood Culture - Final


NO GROWTH AFTER 5 DAYS Complete





 7/28/19 10:30


Sputum Induced Gram Stain - Final Complete


 


 7/28/19 10:30


Sputum Induced Sputum Culture - Final


NORMAL UPPER RESPIRATORY PEYTON PRESENT Complete


 


 8/1/19 18:00


External Cath Urine Culture - Final


Candida Albicans Complete


 


 7/26/19 04:30


Rectum - Final


NO CARBAPENEM-RESISTANT ENTEROBACTERI... Complete











Laboratory Tests








Test


  8/5/19


06:06


 


White Blood Count


  10.9 K/UL


(4.8-10.8)  H


 


Red Blood Count


  3.54 M/UL


(4.20-5.40)  L


 


Hemoglobin


  9.9 G/DL


(12.0-16.0)  L


 


Hematocrit


  32.1 %


(37.0-47.0)  L


 


Mean Corpuscular Volume 91 FL (80-99)  


 


Mean Corpuscular Hemoglobin


  28.0 PG


(27.0-31.0)


 


Mean Corpuscular Hemoglobin


Concent 30.9 G/DL


(32.0-36.0)  L


 


Red Cell Distribution Width


  15.5 %


(11.6-14.8)  H


 


Platelet Count


  408 K/UL


(150-450)


 


Mean Platelet Volume


  6.0 FL


(6.5-10.1)  L


 


Neutrophils (%) (Auto)


  71.4 %


(45.0-75.0)


 


Lymphocytes (%) (Auto)


  15.0 %


(20.0-45.0)  L


 


Monocytes (%) (Auto)


  11.0 %


(1.0-10.0)  H


 


Eosinophils (%) (Auto)


  2.1 %


(0.0-3.0)


 


Basophils (%) (Auto)


  0.6 %


(0.0-2.0)


 


Sodium Level


  138 MMOL/L


(136-145)


 


Potassium Level


  3.9 MMOL/L


(3.5-5.1)


 


Chloride Level


  99 MMOL/L


()


 


Carbon Dioxide Level


  36 MMOL/L


(21-32)  H


 


Anion Gap


  3 mmol/L


(5-15)  L


 


Blood Urea Nitrogen


  35 mg/dL


(7-18)  H


 


Creatinine


  1.4 MG/DL


(0.55-1.30)  H


 


Estimat Glomerular Filtration


Rate  mL/min (>60)  


 


 


Glucose Level


  165 MG/DL


()  H


 


Calcium Level


  8.8 MG/DL


(8.5-10.1)


 


Total Bilirubin


  0.4 MG/DL


(0.2-1.0)


 


Aspartate Amino Transf


(AST/SGOT) 14 U/L (15-37)


L


 


Alanine Aminotransferase


(ALT/SGPT) 13 U/L (12-78)


 


 


Alkaline Phosphatase


  103 U/L


()


 


Pro-B-Type Natriuretic Peptide


  4464 pg/mL


(0-125)  H


 


Total Protein


  6.3 G/DL


(6.4-8.2)  L


 


Albumin


  2.4 G/DL


(3.4-5.0)  L


 


Globulin 3.9 g/dL  


 


Albumin/Globulin Ratio


  0.6 (1.0-2.7)


L











Current Medications








 Medications


  (Trade)  Dose


 Ordered  Sig/Michele


 Route


 PRN Reason  Start Time


 Stop Time Status Last Admin


Dose Admin


 


 Acetaminophen


  (Tylenol)  650 mg  Q6H  PRN


 ORAL


 Mild Pain/Temp > 100.5  7/30/19 18:15


 8/26/19 18:14  8/4/19 08:33


 


 


 Amlodipine


 Besylate


  (Norvasc)  5 mg  DAILY


 ORAL


   7/31/19 09:00


 8/26/19 08:59  8/5/19 08:49


 


 


 Apixaban


  (Eliquis)  5 mg  BID


 ORAL


   7/30/19 18:00


 8/25/19 08:59  8/5/19 08:49


 


 


 Aspirin


  (ASA)  81 mg  DAILY


 ORAL


   7/31/19 09:00


 8/25/19 08:59  8/5/19 08:49


 


 


 Atorvastatin


 Calcium


  (Lipitor)  80 mg  BEDTIME


 ORAL


   7/30/19 21:00


 8/25/19 20:59  8/4/19 20:35


 


 


 Dextrose


  (Dextrose 50%)  25 ml  Q30M  PRN


 IV


 Hypoglycemia  7/30/19 18:15


 8/29/19 07:14   


 


 


 Dextrose


  (Dextrose 50%)  50 ml  Q30M  PRN


 IV


 Hypoglycemia  7/30/19 18:15


 8/29/19 07:14   


 


 


 Docusate Sodium


  (Colace)  100 mg  TWICE A  DAY


 ORAL


   8/2/19 09:00


 9/1/19 08:59  8/5/19 08:49


 


 


 Fluconazole


  (Diflucan)  100 mg  DAILY


 ORAL


   8/5/19 09:00


 8/12/19 08:59  8/5/19 08:48


 


 


 Hydralazine HCl


  (Apresoline)  50 mg  EVERY 8  HOURS


 ORAL


   7/30/19 22:00


 8/25/19 05:59  8/5/19 05:36


 


 


 Insulin Aspart


  (NovoLOG)    BEFORE MEALS AND  HS


 SUBQ


   7/30/19 21:00


 8/29/19 11:29  8/5/19 12:49


 


 


 Insulin Detemir


  (Levemir)  12 units  BID


 SUBQ


   7/31/19 09:00


 8/25/19 20:59  8/5/19 08:51


 


 


 Metformin HCl


  (Glucophage)  500 mg  BID


 ORAL


   8/1/19 09:00


 8/31/19 08:59  8/5/19 08:48


 


 


 Metoprolol


 Tartrate


  (Lopressor)  50 mg  Q6HR


 ORAL


   7/30/19 18:00


 8/26/19 18:29  8/5/19 12:46


 


 


 Nateglinide


  (Starlix)  120 mg  TIAC


 ORAL


   7/31/19 11:30


 8/30/19 11:29  8/5/19 12:46


 


 


 Ondansetron HCl


  (Zofran)  4 mg  Q4H  PRN


 IVP


 Nausea & Vomiting  8/3/19 17:15


 9/2/19 17:14  8/3/19 17:38


 


 


 Pantoprazole


  (Protonix)  40 mg  ACBREAKFAST


 ORAL


   8/1/19 06:30


 8/31/19 06:29  8/5/19 06:02


 


 


 Prednisone


  (predniSONE)  10 mg  DAILY


 ORAL


   8/1/19 09:00


 8/31/19 08:59  8/5/19 08:48


 


 


 Salmeterol


 Xinafoate/


 Fluticasone


  (Advair 250/50


 Diskus)  1 puffs  BID


 INH


   8/2/19 18:00


 9/1/19 17:59  8/5/19 08:39


 


 


 Sitagliptin


 Phosphate


  (Januvia)  50 mg  ACBREAKFAST


 ORAL


   8/4/19 06:30


 9/3/19 06:29  8/5/19 06:02


 


 


 Tramadol HCl


  (Ultram)  50 mg  Q6H  PRN


 ORAL


 Severe Pain (Pain Scale 7-10)  8/1/19 20:30


 8/8/19 20:29  8/2/19 17:28


 

















Eldon Gould MD Aug 5, 2019 13:41

## 2019-08-05 NOTE — NUR
NURSE NOTES:

Spoke to quyen in case mgnt. She stated that she will call the daughter and talk with her.

## 2019-08-06 VITALS — SYSTOLIC BLOOD PRESSURE: 148 MMHG | DIASTOLIC BLOOD PRESSURE: 77 MMHG

## 2019-08-06 VITALS — DIASTOLIC BLOOD PRESSURE: 69 MMHG | SYSTOLIC BLOOD PRESSURE: 165 MMHG

## 2019-08-06 VITALS — SYSTOLIC BLOOD PRESSURE: 125 MMHG | DIASTOLIC BLOOD PRESSURE: 63 MMHG

## 2019-08-06 VITALS — SYSTOLIC BLOOD PRESSURE: 145 MMHG | DIASTOLIC BLOOD PRESSURE: 66 MMHG

## 2019-08-06 VITALS — SYSTOLIC BLOOD PRESSURE: 122 MMHG | DIASTOLIC BLOOD PRESSURE: 61 MMHG

## 2019-08-06 VITALS — DIASTOLIC BLOOD PRESSURE: 70 MMHG | SYSTOLIC BLOOD PRESSURE: 158 MMHG

## 2019-08-06 RX ADMIN — METFORMIN HYDROCHLORIDE SCH MG: 500 TABLET ORAL at 08:56

## 2019-08-06 RX ADMIN — INSULIN ASPART SCH UNITS: 100 INJECTION, SOLUTION INTRAVENOUS; SUBCUTANEOUS at 05:39

## 2019-08-06 RX ADMIN — APIXABAN SCH MG: 5 TABLET, FILM COATED ORAL at 17:20

## 2019-08-06 RX ADMIN — SITAGLIPTIN SCH MG: 50 TABLET, FILM COATED ORAL at 05:38

## 2019-08-06 RX ADMIN — METOPROLOL TARTRATE SCH MG: 50 TABLET, FILM COATED ORAL at 12:14

## 2019-08-06 RX ADMIN — DOCUSATE SODIUM SCH MG: 100 CAPSULE, LIQUID FILLED ORAL at 12:22

## 2019-08-06 RX ADMIN — ASPIRIN 81 MG SCH MG: 81 TABLET ORAL at 08:56

## 2019-08-06 RX ADMIN — INSULIN ASPART SCH UNITS: 100 INJECTION, SOLUTION INTRAVENOUS; SUBCUTANEOUS at 16:47

## 2019-08-06 RX ADMIN — DOCUSATE SODIUM SCH MG: 100 CAPSULE, LIQUID FILLED ORAL at 17:18

## 2019-08-06 RX ADMIN — METOPROLOL TARTRATE SCH MG: 50 TABLET, FILM COATED ORAL at 05:38

## 2019-08-06 RX ADMIN — HYDRALAZINE HYDROCHLORIDE SCH MG: 50 TABLET ORAL at 13:59

## 2019-08-06 RX ADMIN — DOCUSATE SODIUM SCH MG: 100 CAPSULE, LIQUID FILLED ORAL at 08:56

## 2019-08-06 RX ADMIN — METOPROLOL TARTRATE SCH MG: 50 TABLET, FILM COATED ORAL at 17:18

## 2019-08-06 RX ADMIN — INSULIN DETEMIR SCH UNITS: 100 INJECTION, SOLUTION SUBCUTANEOUS at 17:24

## 2019-08-06 RX ADMIN — FLUTICASONE PROPIONATE AND SALMETEROL SCH PUFFS: 50; 250 POWDER RESPIRATORY (INHALATION) at 19:13

## 2019-08-06 RX ADMIN — METFORMIN HYDROCHLORIDE SCH MG: 500 TABLET ORAL at 17:17

## 2019-08-06 RX ADMIN — METOPROLOL TARTRATE SCH MG: 50 TABLET, FILM COATED ORAL at 01:08

## 2019-08-06 RX ADMIN — HYDRALAZINE HYDROCHLORIDE SCH MG: 50 TABLET ORAL at 05:38

## 2019-08-06 RX ADMIN — INSULIN ASPART SCH UNITS: 100 INJECTION, SOLUTION INTRAVENOUS; SUBCUTANEOUS at 12:16

## 2019-08-06 RX ADMIN — APIXABAN SCH MG: 5 TABLET, FILM COATED ORAL at 08:57

## 2019-08-06 RX ADMIN — FLUCONAZOLE SCH MG: 100 TABLET ORAL at 08:56

## 2019-08-06 RX ADMIN — INSULIN DETEMIR SCH UNITS: 100 INJECTION, SOLUTION SUBCUTANEOUS at 09:00

## 2019-08-06 RX ADMIN — FLUTICASONE PROPIONATE AND SALMETEROL SCH PUFFS: 50; 250 POWDER RESPIRATORY (INHALATION) at 07:14

## 2019-08-06 NOTE — NUR
*-* INSURANCE *-*



ALL CLINICALS AND REVIEWS HAVE BEEN FAXED TO:



[facility]

P: [phone]

F: [phone] (FAX CLINICALS)

## 2019-08-06 NOTE — NUR
HAND-OFF: 

Patient transferred out the unit to [facility] via [facility]. Endorsed plan of care.

## 2019-08-06 NOTE — NUR
HAND-OFF: 

Report given to CARA Conde RN. Patient in bed in stable condition. Endorsed plan of care.

## 2019-08-06 NOTE — PULMONOLOGY PROGRESS NOTE
Assessment/Plan


Assessment/Plan


1. Congestive heart failure, controlled


2. Diabetes, out of control, now controlled


3. Altered mental status due to CO2 narcosis, stable


4. COPD with resp failure, stable


5. Sleep apnea.


6. Atrial fibrillation, not on anticoagulants.


7. Coronary heart disease.


8. Aortic atherosclerosis.


9. Old cerebral infarct





resp failure, mild hypercarbia, stable


steroid PO


DC plan to snf w BiPAP - awaiting bed availability


disc w RN, 





Subjective


ROS Limited/Unobtainable:  Yes


Allergies:  


Coded Allergies:  


     SULFA (SULFONAMIDE ANTIBIOTICS) (Unverified  Allergy, Unknown, 7/26/19)


Uncoded Allergies:  


     SULFA (Allergy, Unknown, 7/25/19)





Objective





Last 24 Hour Vital Signs








  Date Time  Temp Pulse Resp B/P (MAP) Pulse Ox O2 Delivery O2 Flow Rate FiO2


 


8/6/19 08:58  84  125/63    


 


8/6/19 07:13  73 20  96 Venturi Mask 4.0 28


 


8/6/19 07:12  73 20  96 Venturi Mask 3.0 28


 


8/6/19 07:06     96 Venturi Mask 3.0 28


 


8/6/19 05:38  91  148/77    


 


8/6/19 05:38    148/77    


 


8/6/19 05:27  88 22  98 Facial  28


 


8/6/19 04:00  81      


 


8/6/19 04:00 98.7 91 18 148/77 (100) 97   


 


8/6/19 03:06  89 19  98 Facial  28


 


8/6/19 01:11  83 22  97 Facial  28


 


8/6/19 01:08  70  158/70    


 


8/6/19 00:00 98.6 86 18 158/70 (99) 95   


 


8/6/19 00:00  97      


 


8/5/19 22:26  80 14  95 Facial  28


 


8/5/19 22:11    132/77    


 


8/5/19 21:00      Nasal Cannula 2.0 





      Simple Mask 2.0 





      Nasal Cannula 2.0 


 


8/5/19 20:00 98.7 79 18 132/77 (95) 94   


 


8/5/19 20:00  91      


 


8/5/19 19:43  75 18  96 Venturi Mask 4.0 28


 


8/5/19 19:43  75 18  96 Venturi Mask 4.0 28


 


8/5/19 19:43     96 Venturi Mask 4.0 28


 


8/5/19 18:03  75  112/61    


 


8/5/19 16:00  90      


 


8/5/19 16:00 98.2 75 22 151/71 (97) 91   


 


8/5/19 12:46  59  143/62    


 


8/5/19 12:00 98.2 90 16 103/51 (68) 97   


 


8/5/19 12:00  86      

















Intake and Output  


 


 8/5/19 8/6/19





 19:00 07:00


 


Intake Total  120 ml


 


Balance  120 ml


 


  


 


Intake Oral  120 ml


 


# Voids  3








General Appearance:  no acute distress


HEENT:  atraumatic


Respiratory/Chest:  lungs clear


Cardiovascular:  normal rate


Neurologic/Psychiatric:  disoriented - confused


Musculoskeletal:  normal muscle bulk





Microbiology








 Date/Time


Source Procedure


Growth Status


 


 


 8/4/19 07:30


Sputum Induced Gram Stain - Final Resulted


 


 8/4/19 07:30 Sputum Culture - Preliminary


Usual Upper Respiratory Verena Resulted











Current Medications








 Medications


  (Trade)  Dose


 Ordered  Sig/Michele


 Route


 PRN Reason  Start Time


 Stop Time Status Last Admin


Dose Admin


 


 Acetaminophen


  (Tylenol)  650 mg  Q6H  PRN


 ORAL


 Mild Pain/Temp > 100.5  7/30/19 18:15


 8/26/19 18:14  8/4/19 08:33


 


 


 Amlodipine


 Besylate


  (Norvasc)  5 mg  DAILY


 ORAL


   7/31/19 09:00


 8/26/19 08:59  8/6/19 08:58


 


 


 Apixaban


  (Eliquis)  5 mg  BID


 ORAL


   7/30/19 18:00


 8/25/19 08:59  8/6/19 08:57


 


 


 Aspirin


  (ASA)  81 mg  DAILY


 ORAL


   7/31/19 09:00


 8/25/19 08:59  8/6/19 08:56


 


 


 Atorvastatin


 Calcium


  (Lipitor)  80 mg  BEDTIME


 ORAL


   7/30/19 21:00


 8/25/19 20:59  8/5/19 22:09


 


 


 Dextrose


  (Dextrose 50%)  25 ml  Q30M  PRN


 IV


 Hypoglycemia  7/30/19 18:15


 8/29/19 07:14   


 


 


 Dextrose


  (Dextrose 50%)  50 ml  Q30M  PRN


 IV


 Hypoglycemia  7/30/19 18:15


 8/29/19 07:14   


 


 


 Docusate Sodium


  (Colace)  100 mg  TWICE A  DAY


 ORAL


   8/2/19 09:00


 9/1/19 08:59  8/6/19 08:56


 


 


 Docusate Sodium


  (Colace)  100 mg  TWICE A  DAY


 ORAL


   8/6/19 09:00


 9/5/19 08:59   


 


 


 Fluconazole


  (Diflucan)  100 mg  DAILY


 ORAL


   8/5/19 09:00


 8/12/19 08:59  8/6/19 08:56


 


 


 Hydralazine HCl


  (Apresoline)  50 mg  EVERY 8  HOURS


 ORAL


   7/30/19 22:00


 8/25/19 05:59  8/6/19 05:38


 


 


 Insulin Aspart


  (NovoLOG)    BEFORE MEALS AND  HS


 SUBQ


   7/30/19 21:00


 8/29/19 11:29  8/5/19 22:13


 


 


 Insulin Detemir


  (Levemir)  12 units  BID


 SUBQ


   7/31/19 09:00


 8/25/19 20:59  8/6/19 09:00


 


 


 Metformin HCl


  (Glucophage)  500 mg  BID


 ORAL


   8/1/19 09:00


 8/31/19 08:59  8/6/19 08:56


 


 


 Metoprolol


 Tartrate


  (Lopressor)  50 mg  Q6HR


 ORAL


   7/30/19 18:00


 8/26/19 18:29  8/6/19 05:38


 


 


 Nateglinide


  (Starlix)  120 mg  TIAC


 ORAL


   7/31/19 11:30


 8/30/19 11:29  8/6/19 05:37


 


 


 Ondansetron HCl


  (Zofran)  4 mg  Q4H  PRN


 IVP


 Nausea & Vomiting  8/3/19 17:15


 9/2/19 17:14  8/3/19 17:38


 


 


 Pantoprazole


  (Protonix)  40 mg  ACBREAKFAST


 ORAL


   8/1/19 06:30


 8/31/19 06:29  8/6/19 05:38


 


 


 Prednisone


  (predniSONE)  10 mg  DAILY


 ORAL


   8/1/19 09:00


 8/31/19 08:59  8/6/19 08:56


 


 


 Salmeterol


 Xinafoate/


 Fluticasone


  (Advair 250/50


 Diskus)  1 puffs  BID


 INH


   8/2/19 18:00


 9/1/19 17:59  8/6/19 07:14


 


 


 Sitagliptin


 Phosphate


  (Januvia)  50 mg  ACBREAKFAST


 ORAL


   8/4/19 06:30


 9/3/19 06:29  8/6/19 05:38


 


 


 Tramadol HCl


  (Ultram)  50 mg  Q6H  PRN


 ORAL


 Severe Pain (Pain Scale 7-10)  8/1/19 20:30


 8/8/19 20:29  8/2/19 17:28


 

















Yakov Alva MD Aug 6, 2019 09:50

## 2019-08-06 NOTE — PROGRESS NOTE
DATE:  08/05/2019

CARDIOLOGY PROGRESS NOTE



SUBJECTIVE:  The patient continues to require episodic BiPAP support due to

respiratory acidosis.  Her blood pressure parameters are labile, but

overall range is adequate.  Heart rate is in the range of 73 to 94 with

sinus rhythm and rare ectopics.  She remains afebrile.



PHYSICAL EXAMINATION:

LUNGS:  Diminished breath sounds.  No wheezing.

HEART:  Regular rhythm and rate.  Normal S1, S2 with no new murmur.

 

ABDOMEN:  Soft.

EXTREMITIES:  There is no edema.



LABORATORY DATA:  White count 10.9, hemoglobin 9.9.  BUN 35, creatinine

1.4, bicarb 36, sodium 138, potassium 3.9.  Albumin 2.4.  Pro-natriuretic

peptide 4450.



IMPRESSION:

1. Respiratory failure.

2. Acute on chronic respiratory acidosis.

3. Acute on chronic diastolic congestive heart failure.

4. Paroxysmal atrial fibrillation.

5. COPD.

6. Coronary atherosclerosis with stable angina.

7. Cerebrovascular disease with history of CVA.



PLAN:

1. Anticoagulation with apixaban for cardioembolic prophylaxis.

2. BiPAP support as needed.

3. Respiratory hygiene and bronchodilators.

4. Steroid taper per pulmonologist.

5. Continue current antihypertensives with titration and hold

parameters.

6. Cautious use of beta-blockers in this clinical setting.

7. Observe for secondary bronchospasm.









  ______________________________________________

  Keon Coker M.D.





DR:  JENISE

D:  08/06/2019 01:53

T:  08/06/2019 03:01

JOB#:  [id number]

CC:

## 2019-08-06 NOTE — NUR
NURSE NOTES:

Patient with episode of confusion; pulling out IV lines, taking off BiPAP mask and trying to 
eat the foam inside of it. Asked what she was doing. answered "Comida". Explained to her 
that that is not edible but unable to comprehend. notified Heidy MERCADO regarding patients 
CHRISTA. New order for bilateral tied Mittens (non-behavioral restraint) received and carried 
out. Restraint protocol followed and observed per protocol. Will continue to monitor.

## 2019-08-06 NOTE — NUR
NOTES







SPOKE WITH GRACY FROM [facility]. BIPAP TO BE DELIVERED TO [facility] BETWEEN 3-6. 
TRANSPORTATION TO BE ARRANGED BY [facility] NO DEFINITIVE TIME CONFIRMED @ THIS 
TIME. PT WILL DC TO [facility] ROOM 211 BED A. NURSE TO CALL REPORT [phone]. FAMILY MADE 
AWARE.

## 2019-08-06 NOTE — NUR
NURSE NOTES:

Received report from GEN Mederos.  in bed AAO X2 eating breakfast. [place] speaking with some 
English. No complaints of acute pain or distress at this time. Placed on continuous cardiac 
monitoring per protocol. On Bipap HS and PRN, tolerates well. Safety precaution in place; 
siderails X3 up, call light within reach, bed in lowest position, brakes and alarm on at all 
times. Will continue with the plan of care.

## 2019-08-06 NOTE — NUR
NURSE NOTES:

Received report from CARA Conde RN. Patient in bed AAO X2-3, Chinese speaking with some 
English. No complaints of acute pain or distress at this time. Placed on continuous cardiac 
monitoring per protocol. On Bipap HS and PRN, tolerates well. Safety precaution in place; 
siderails X3 up, call light within reach, bed in lowest position, brakes and alarm on at all 
times.

## 2019-08-07 NOTE — DISCHARGE SUMMARY
Lilly Pink LUCY 8/7/19 0852:


Discharge Summary


Discharge Summary


_


DATE OF ADMISSION: 7/26/2019





DATE OF DISCHARGE: 8/06/2019








DISCHARGED BY: Dr Alva





REASON FOR ADMISSION: 


74 years old female with past medical history of congestive heart failure, type 

2 diabetes mellitus, sick sinus syndrome, status post pacemaker implantation, 

COPD, sleep apnea, anemia, aortic atherosclerosis, coronary heart disease, 

chronic back pain, chronic diastolic heart failure, chronic kidney disease, 

hyperlipidemia, degenerative disc disease, peripheral neuropathy, morbid obesity

, frequent falls, oxygen dependent at home, status post coronary stents, 

diabetic retinopathy, vitamin D deficiency, presented with shortness of breath 

and blood sugar out of control.  


Patient was recently hospitalized at another facility /[facility]  

for acute CHF exacerbation and subsequently was discharged home.  


She became weak at home after several days,  and  family brought her to urgent 

care,  where she was evaluated  and  found to have heart failure , elevated  

blood sugar 437 and hypoxia( 80% on room air).  Patient was placed on 4 L of 

oxygen via basal cannula , pulse oximetry  went up  to 98%.


Patient received   Lasix and insulin and subsequently was transferred to the 

skilled nursing facility via [facility] ambulance.   


However when she presented to the skilled nursing facility,  due to her 

condition nursing staff refused admission , and paramedics brought her to the 

emergency room for further evaluation and management.  


Patient presented with generalized weakness and altered mental status.


No reported fever and chills.  No reported nausea and vomiting.  


CT of the head revealed old right frontal deep white matter lacunar infarct.  

Negative for acute intracranial bleeding or mass effect.  


Chest x-ray demonstrated cardiomegaly and pulmonary congestion.


Laboratory work-up revealed no leukocytosis, hemoglobin 11.7, hematocrit 37.9, 

platelets 586.


Potassium 6.6, BUN 26, creatinine 1.1.


Glucose 424.


Anion gap 3.  Stable LFT.  


Initial troponin negative.  Pro BNP 4070.  EKG revealed sinus tachycardia with 

bifascicular block.   


Patient subsequently was admitted for congestive heart failure,  diabetes 

mellitus out-of-control and  altered mental status.





CONSULTANTS:


cardiologist 


ID specialist 


Endocrinologist Dr. Mendoza


 


 


[facility] COURSE: 


Patient admitted to telemetry floor.  


Patient started on diuresis with close monitoring of volumes and cardiorenal 

parameters.  


Cardiologist followed.


Blood sugar was managed with   insulin.  Endocrinology consult was requested.  


Supplemental oxygen titrated to keep pulse oximetry above 90%.  


Pulmonary toilet with bronchodilator provided.  


Patient was on the BiPAP.  


The next day ER physician was called for evaluation to the ICU , since patient 

was becoming unstable on BiPAP and PCO2 was getting worse.  


Patient appeared to be lethargic .


Patient subsequently was orally intubated and transferred to ICU.  


 


Patient was transferred to ICU.  


Ventilator support and pulmonary toilet provided.


Echocardiogram revealed ejection fraction of 40 to 45% with global left 

ventricular hypokinesis.  


Mild left ventricular hypertrophy.  No evidence of pericardial effusion.  


Right ventricular systolic pressure of 31.  


Telemetry showed atrial fibrillation. 


Venous duplex bilateral lower extremity revealed no evidence of acute DVT.  


Patient was continued on intravenous diuresis.  


Anti-failure and antihypertensive medications titrated as per cardiologist.  


Patient started on full anticoagulation with apixaban for cardioembolic 

prophylaxis.





Patient started on empiric antibiotics.  


ID specialist followed.  


Urine culture revealed Klebsiella.  


Sputum culture was negative.  Blood cultures were negative.  


Repeated urine , blood  and sputum culture were negative.  Last urine culture 

revealed Candida, colonizer as per ID specialist, and not required treatment.  


Antibiotic regimen was further optimized as per ID specialist recommendations.


Leukocytosis , which started on the second day of the admission , resolved.  No 

fevers.


Patient completed antibiotics while in the hospital. 


ID specialist recommended to monitor patient off antibiotic and observe 

closely.   





Endocrinologist followed.  Hemoglobin A1c 10, clearly not at goal.  


Anti-glycemic regimen was optimized as per endocrinologist recommendation.  


Patient was on long-acting Levemir twice a day along with Januvia,  Starlix, 

metformin,  and sliding scale of insulin.  Blood sugar improved.





Patient was followed-up with ABG and chest x-ray.  


Ventilator settings adjusted as needed.  


Patient was able to be weaned from ventilator.  Patient was placed on the BiPAP 

initially.  


Supplemental oxygen titrated as needed to keep pulse oximetry above 92%.  





Laboratory work-up revealed acute renal failure post diuresis .


Diuresis subsequently was stopped.  


Initially started steroids for COPD exacerbation ,  were tapered.  


Renal parameters and electrolytes were closely monitored.  Electrolytes/

potassium and magnesium corrected as needed.


Creatinine down to 1.4 prior to discharge.  Avoid nephrotoxic if possible in 

future








Troponin levels were trending.


First two troponin were negative , and then the last four revealed mild 

elevation.  


Per cardiologist, patient had acute myocardial ischemia, possible non-STEMI.  


Antiplatelet therapy with aspirin  provided.  


Beta-blocker therapy started for rate control , and the dose was advanced.


Blood pressure was managed with current regimen,  and doses were   optimized 

based on blood pressure readings , as per cardiologist.  


Statin continued.


Patient spontaneously converted to sinus return.  


Antiembolic prophylaxis with apixaban continued. Bleeding precaution 

maintained.  No evidence of bleeding. 


GI prophylaxis provided.   


Hemoglobin  and  hematocrit remained stable.  Discharge hemoglobin 9.9, 

hematocrit 32.1.





Placement was found and secured at the skilled nursing facility.  


BiPAP was arranged to be delivered to the facility by [facility].  


Patient was stable for discharge to skilled nursing facility for continuation 

of care.








FINAL DIAGNOSES: 


Sepsis


Klebsiella UTI


Acute respiratory failure , requiring intubation /extubated 


Acute respiratory acidosis


Acute on chronic diastolic and systolic congestive heart failure


Paroxysmal atrial fibrillation 


Ischemic cardiomyopathy


Hypertensive heart disease


Sick sinus syndrome, status post permanent pacemaker


Conduction system disease of the heart with bifascicular block


Acute myocardial ischemia and possible non-NSTEMI


Acute renal failure,  post diuresis


COPD exacerbation  


Possible pneumonia 


Obstructive sleep apnea


Cerebrovascular diseases


Metabolic encephalopathy


Diabetes mellitus out of control


Anemia


Coronary artery disease


Dyslipidemia


Moderate protein calorie malnutrition


Neuropathy


Obesity


Vitamin D deficiency





DISCHARGE MEDICATIONS:


See Medication Reconciliation list.





DISCHARGE INSTRUCTIONS:


Patient was discharged to the skilled nursing facility. 


Follow up with medical doctor at the facility.  74 years old female








I have been assigned to dictate discharge summary for this account. 


I was not involved in the patient's management.





Yakov Alva MD 8/7/19 0952:


Discharge Summary


Discharge Summary


_


On admission found to have respiratory failure with hypercapnea and required 

intubation and 


mechanical ventilation











Lilly Pink NP Aug 7, 2019 08:52


Yakov Alva MD Aug 7, 2019 09:52

## 2019-08-07 NOTE — DIAGNOSTIC IMAGING REPORT
--------------- APPROVED REPORT --------------





CPT Code: 87167



Present Symptoms

Comments: LEFT LEG PAIN.





LEFT LEG: Venous imaging reveals a patent deep venous system. There is no evidence of 

thrombus within the femoral, popliteal or tibial segments. The greater saphenous vein is 

also within normal limits. Doppler indicates normal spontaneous flow within these 

segments.

## 2019-08-07 NOTE — PROGRESS NOTE
DATE:  08/06/2019

CARDIOLOGY PROGRESS NOTE



SUBJECTIVE:  The patient is comfortable with no shortness of breath.  She

is on low-dose steroid.  She remains on full anticoagulation for

cardioembolic prophylaxis.



OBJECTIVE:

VITAL SIGNS:  Blood pressure 148/77, pulse 91, and respirations 22.

LUNGS:  Good breath sounds.

HEART:  Regular rhythm and rate.  Normal S1 and S2.

ABDOMEN:  Soft.  No edema.



IMPRESSION:

1. Acute on chronic diastolic congestive heart failure, now

compensated.

2. Paroxysmal atrial fibrillation, stable.

3. Coronary artery disease with stable angina.

4. Atherosclerosis, on anticoagulation and anti-lipid drugs.

5. Status post respiratory failure due to chronic obstructive pulmonary

disease.

6. Acute on chronic respiratory acidosis, on intermittent BiPAP.



PLAN:  Discharge medication list reviewed in anticipation of discharge to

subacute facility today.









  ______________________________________________

  Keon Coker M.D.





DR:  MARY

D:  08/07/2019 02:37

T:  08/07/2019 02:53

JOB#:  [id number]

CC: